# Patient Record
Sex: MALE | Race: WHITE | NOT HISPANIC OR LATINO | Employment: OTHER | URBAN - METROPOLITAN AREA
[De-identification: names, ages, dates, MRNs, and addresses within clinical notes are randomized per-mention and may not be internally consistent; named-entity substitution may affect disease eponyms.]

---

## 2022-10-26 ENCOUNTER — MEDICAL CORRESPONDENCE (OUTPATIENT)
Dept: HEALTH INFORMATION MANAGEMENT | Facility: CLINIC | Age: 87
End: 2022-10-26

## 2022-12-06 ENCOUNTER — APPOINTMENT (OUTPATIENT)
Dept: GENERAL RADIOLOGY | Facility: CLINIC | Age: 87
DRG: 480 | End: 2022-12-06
Attending: EMERGENCY MEDICINE
Payer: COMMERCIAL

## 2022-12-06 ENCOUNTER — APPOINTMENT (OUTPATIENT)
Dept: CT IMAGING | Facility: CLINIC | Age: 87
DRG: 480 | End: 2022-12-06
Attending: EMERGENCY MEDICINE
Payer: COMMERCIAL

## 2022-12-06 ENCOUNTER — HOSPITAL ENCOUNTER (INPATIENT)
Facility: CLINIC | Age: 87
LOS: 15 days | Discharge: NURSING, REHAB OR LONG-TERM CARE | DRG: 480 | End: 2022-12-21
Attending: EMERGENCY MEDICINE | Admitting: STUDENT IN AN ORGANIZED HEALTH CARE EDUCATION/TRAINING PROGRAM
Payer: COMMERCIAL

## 2022-12-06 ENCOUNTER — TRANSFERRED RECORDS (OUTPATIENT)
Dept: HEALTH INFORMATION MANAGEMENT | Facility: CLINIC | Age: 87
End: 2022-12-06

## 2022-12-06 DIAGNOSIS — S63.125A CLOSED DISLOCATION OF INTERPHALANGEAL JOINT OF LEFT THUMB, INITIAL ENCOUNTER: ICD-10-CM

## 2022-12-06 DIAGNOSIS — R79.89 ELEVATED TROPONIN: ICD-10-CM

## 2022-12-06 DIAGNOSIS — S61.012A THUMB LACERATION, LEFT, INITIAL ENCOUNTER: ICD-10-CM

## 2022-12-06 DIAGNOSIS — S42.201A CLOSED FRACTURE OF PROXIMAL END OF RIGHT HUMERUS, UNSPECIFIED FRACTURE MORPHOLOGY, INITIAL ENCOUNTER: ICD-10-CM
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
## 2022-12-06 DIAGNOSIS — J18.9 PNEUMONIA DUE TO INFECTIOUS ORGANISM, UNSPECIFIED LATERALITY, UNSPECIFIED PART OF LUNG: Primary | ICD-10-CM

## 2022-12-06 DIAGNOSIS — S72.142A DISPLACED INTERTROCHANTERIC FRACTURE OF LEFT FEMUR, INITIAL ENCOUNTER FOR CLOSED FRACTURE (H): ICD-10-CM

## 2022-12-06 LAB
ALBUMIN UR-MCNC: 30 MG/DL
ANION GAP SERPL CALCULATED.3IONS-SCNC: 10 MMOL/L (ref 7–15)
APPEARANCE UR: CLEAR
BACTERIA #/AREA URNS HPF: ABNORMAL /HPF
BASOPHILS # BLD AUTO: 0 10E3/UL (ref 0–0.2)
BASOPHILS NFR BLD AUTO: 0 %
BILIRUB UR QL STRIP: NEGATIVE
BUN SERPL-MCNC: 24 MG/DL (ref 8–23)
CALCIUM SERPL-MCNC: 9.2 MG/DL (ref 8.8–10.2)
CHLORIDE SERPL-SCNC: 103 MMOL/L (ref 98–107)
COLOR UR AUTO: YELLOW
CREAT SERPL-MCNC: 0.93 MG/DL (ref 0.67–1.17)
DEPRECATED CALCIDIOL+CALCIFEROL SERPL-MC: 51 UG/L (ref 20–75)
DEPRECATED HCO3 PLAS-SCNC: 25 MMOL/L (ref 22–29)
EOSINOPHIL # BLD AUTO: 0 10E3/UL (ref 0–0.7)
EOSINOPHIL NFR BLD AUTO: 0 %
ERYTHROCYTE [DISTWIDTH] IN BLOOD BY AUTOMATED COUNT: 11.8 % (ref 10–15)
GFR SERPL CREATININE-BSD FRML MDRD: 80 ML/MIN/1.73M2
GLUCOSE SERPL-MCNC: 155 MG/DL (ref 70–99)
GLUCOSE UR STRIP-MCNC: NEGATIVE MG/DL
HCT VFR BLD AUTO: 38.3 % (ref 40–53)
HGB BLD-MCNC: 12.6 G/DL (ref 13.3–17.7)
HGB UR QL STRIP: NEGATIVE
HOLD SPECIMEN: NORMAL
HYALINE CASTS: 1 /LPF
IMM GRANULOCYTES # BLD: 0.2 10E3/UL
IMM GRANULOCYTES NFR BLD: 1 %
KETONES UR STRIP-MCNC: 20 MG/DL
LEUKOCYTE ESTERASE UR QL STRIP: NEGATIVE
LYMPHOCYTES # BLD AUTO: 0.6 10E3/UL (ref 0.8–5.3)
LYMPHOCYTES NFR BLD AUTO: 3 %
MAGNESIUM SERPL-MCNC: 2 MG/DL (ref 1.7–2.3)
MCH RBC QN AUTO: 32.6 PG (ref 26.5–33)
MCHC RBC AUTO-ENTMCNC: 32.9 G/DL (ref 31.5–36.5)
MCV RBC AUTO: 99 FL (ref 78–100)
MONOCYTES # BLD AUTO: 1.2 10E3/UL (ref 0–1.3)
MONOCYTES NFR BLD AUTO: 6 %
MUCOUS THREADS #/AREA URNS LPF: PRESENT /LPF
NEUTROPHILS # BLD AUTO: 18.4 10E3/UL (ref 1.6–8.3)
NEUTROPHILS NFR BLD AUTO: 90 %
NITRATE UR QL: NEGATIVE
NRBC # BLD AUTO: 0 10E3/UL
NRBC BLD AUTO-RTO: 0 /100
PH UR STRIP: 5.5 [PH] (ref 5–7)
PLATELET # BLD AUTO: 278 10E3/UL (ref 150–450)
POTASSIUM SERPL-SCNC: 4.1 MMOL/L (ref 3.4–5.3)
RBC # BLD AUTO: 3.86 10E6/UL (ref 4.4–5.9)
RBC URINE: 2 /HPF
SODIUM SERPL-SCNC: 138 MMOL/L (ref 136–145)
SP GR UR STRIP: 1.01 (ref 1–1.03)
SQUAMOUS EPITHELIAL: 2 /HPF
TROPONIN T SERPL HS-MCNC: 26 NG/L
TROPONIN T SERPL HS-MCNC: 31 NG/L
UROBILINOGEN UR STRIP-MCNC: NORMAL MG/DL
WBC # BLD AUTO: 20.3 10E3/UL (ref 4–11)
WBC URINE: 3 /HPF

## 2022-12-06 PROCEDURE — 90715 TDAP VACCINE 7 YRS/> IM: CPT | Performed by: EMERGENCY MEDICINE

## 2022-12-06 PROCEDURE — 96365 THER/PROPH/DIAG IV INF INIT: CPT

## 2022-12-06 PROCEDURE — 82306 VITAMIN D 25 HYDROXY: CPT | Performed by: PHYSICIAN ASSISTANT

## 2022-12-06 PROCEDURE — 84484 ASSAY OF TROPONIN QUANT: CPT | Performed by: EMERGENCY MEDICINE

## 2022-12-06 PROCEDURE — 81001 URINALYSIS AUTO W/SCOPE: CPT | Performed by: STUDENT IN AN ORGANIZED HEALTH CARE EDUCATION/TRAINING PROGRAM

## 2022-12-06 PROCEDURE — 99223 1ST HOSP IP/OBS HIGH 75: CPT | Mod: AI | Performed by: STUDENT IN AN ORGANIZED HEALTH CARE EDUCATION/TRAINING PROGRAM

## 2022-12-06 PROCEDURE — 73700 CT LOWER EXTREMITY W/O DYE: CPT | Mod: RT

## 2022-12-06 PROCEDURE — 99285 EMERGENCY DEPT VISIT HI MDM: CPT | Mod: 25

## 2022-12-06 PROCEDURE — 72125 CT NECK SPINE W/O DYE: CPT

## 2022-12-06 PROCEDURE — 258N000003 HC RX IP 258 OP 636: Performed by: STUDENT IN AN ORGANIZED HEALTH CARE EDUCATION/TRAINING PROGRAM

## 2022-12-06 PROCEDURE — 250N000011 HC RX IP 250 OP 636: Performed by: STUDENT IN AN ORGANIZED HEALTH CARE EDUCATION/TRAINING PROGRAM

## 2022-12-06 PROCEDURE — 70450 CT HEAD/BRAIN W/O DYE: CPT

## 2022-12-06 PROCEDURE — C9803 HOPD COVID-19 SPEC COLLECT: HCPCS

## 2022-12-06 PROCEDURE — 12002 RPR S/N/AX/GEN/TRNK2.6-7.5CM: CPT

## 2022-12-06 PROCEDURE — 26770 TREAT FINGER DISLOCATION: CPT | Mod: FA

## 2022-12-06 PROCEDURE — 83735 ASSAY OF MAGNESIUM: CPT | Performed by: EMERGENCY MEDICINE

## 2022-12-06 PROCEDURE — 90471 IMMUNIZATION ADMIN: CPT | Performed by: EMERGENCY MEDICINE

## 2022-12-06 PROCEDURE — 250N000011 HC RX IP 250 OP 636: Performed by: EMERGENCY MEDICINE

## 2022-12-06 PROCEDURE — 250N000013 HC RX MED GY IP 250 OP 250 PS 637: Performed by: STUDENT IN AN ORGANIZED HEALTH CARE EDUCATION/TRAINING PROGRAM

## 2022-12-06 PROCEDURE — 85025 COMPLETE CBC W/AUTO DIFF WBC: CPT | Performed by: EMERGENCY MEDICINE

## 2022-12-06 PROCEDURE — 73030 X-RAY EXAM OF SHOULDER: CPT | Mod: RT

## 2022-12-06 PROCEDURE — 73130 X-RAY EXAM OF HAND: CPT | Mod: LT

## 2022-12-06 PROCEDURE — 73560 X-RAY EXAM OF KNEE 1 OR 2: CPT | Mod: RT

## 2022-12-06 PROCEDURE — 36415 COLL VENOUS BLD VENIPUNCTURE: CPT | Performed by: EMERGENCY MEDICINE

## 2022-12-06 PROCEDURE — 80048 BASIC METABOLIC PNL TOTAL CA: CPT | Performed by: EMERGENCY MEDICINE

## 2022-12-06 PROCEDURE — 73502 X-RAY EXAM HIP UNI 2-3 VIEWS: CPT

## 2022-12-06 PROCEDURE — 120N000001 HC R&B MED SURG/OB

## 2022-12-06 RX ORDER — DONEPEZIL HYDROCHLORIDE 5 MG/1
5 TABLET, FILM COATED ORAL AT BEDTIME
Status: ON HOLD | COMMUNITY
End: 2024-01-01

## 2022-12-06 RX ORDER — DOCUSATE SODIUM 100 MG/1
100 CAPSULE, LIQUID FILLED ORAL 2 TIMES DAILY
Status: DISCONTINUED | OUTPATIENT
Start: 2022-12-06 | End: 2022-12-17

## 2022-12-06 RX ORDER — ACETAMINOPHEN 325 MG/1
975 TABLET ORAL EVERY 8 HOURS
Status: DISCONTINUED | OUTPATIENT
Start: 2022-12-06 | End: 2022-12-21 | Stop reason: HOSPADM

## 2022-12-06 RX ORDER — VITAMIN B COMPLEX
50 TABLET ORAL DAILY
Status: DISCONTINUED | OUTPATIENT
Start: 2022-12-07 | End: 2022-12-21 | Stop reason: HOSPADM

## 2022-12-06 RX ORDER — MULTIPLE VITAMINS W/ MINERALS TAB 9MG-400MCG
1 TAB ORAL EVERY EVENING
COMMUNITY

## 2022-12-06 RX ORDER — CEFAZOLIN SODIUM 2 G/100ML
2 INJECTION, SOLUTION INTRAVENOUS ONCE
Status: COMPLETED | OUTPATIENT
Start: 2022-12-06 | End: 2022-12-06

## 2022-12-06 RX ORDER — PROCHLORPERAZINE MALEATE 5 MG
5 TABLET ORAL EVERY 6 HOURS PRN
Status: DISCONTINUED | OUTPATIENT
Start: 2022-12-06 | End: 2022-12-21 | Stop reason: HOSPADM

## 2022-12-06 RX ORDER — GINGER ROOT/GINGER ROOT EXT 262.5 MG
1 CAPSULE ORAL EVERY EVENING
COMMUNITY

## 2022-12-06 RX ORDER — HYDROMORPHONE HCL IN WATER/PF 6 MG/30 ML
0.2 PATIENT CONTROLLED ANALGESIA SYRINGE INTRAVENOUS
Status: DISCONTINUED | OUTPATIENT
Start: 2022-12-06 | End: 2022-12-15

## 2022-12-06 RX ORDER — IBUPROFEN 200 MG
400 TABLET ORAL EVERY 4 HOURS PRN
Status: ON HOLD | COMMUNITY
End: 2022-12-20

## 2022-12-06 RX ORDER — ONDANSETRON 2 MG/ML
4 INJECTION INTRAMUSCULAR; INTRAVENOUS EVERY 6 HOURS PRN
Status: DISCONTINUED | OUTPATIENT
Start: 2022-12-06 | End: 2022-12-21 | Stop reason: HOSPADM

## 2022-12-06 RX ORDER — BISACODYL 10 MG
10 SUPPOSITORY, RECTAL RECTAL DAILY PRN
Status: DISCONTINUED | OUTPATIENT
Start: 2022-12-06 | End: 2022-12-07

## 2022-12-06 RX ORDER — DONEPEZIL HYDROCHLORIDE 5 MG/1
5 TABLET, FILM COATED ORAL AT BEDTIME
Status: DISCONTINUED | OUTPATIENT
Start: 2022-12-06 | End: 2022-12-21 | Stop reason: HOSPADM

## 2022-12-06 RX ORDER — MULTIVIT WITH MINERALS/LUTEIN
1000 TABLET ORAL DAILY
COMMUNITY

## 2022-12-06 RX ORDER — BUPIVACAINE HYDROCHLORIDE 5 MG/ML
INJECTION, SOLUTION EPIDURAL; INTRACAUDAL
Status: DISCONTINUED
Start: 2022-12-06 | End: 2022-12-06 | Stop reason: HOSPADM

## 2022-12-06 RX ORDER — MAGNESIUM OXIDE 400 MG/1
400 TABLET ORAL DAILY
Status: ON HOLD | COMMUNITY
End: 2024-01-01

## 2022-12-06 RX ORDER — ACETAMINOPHEN 500 MG
1000 TABLET ORAL EVERY 6 HOURS PRN
COMMUNITY
End: 2024-01-01

## 2022-12-06 RX ORDER — SODIUM CHLORIDE, SODIUM LACTATE, POTASSIUM CHLORIDE, CALCIUM CHLORIDE 600; 310; 30; 20 MG/100ML; MG/100ML; MG/100ML; MG/100ML
INJECTION, SOLUTION INTRAVENOUS CONTINUOUS
Status: DISCONTINUED | OUTPATIENT
Start: 2022-12-06 | End: 2022-12-07

## 2022-12-06 RX ORDER — AMOXICILLIN 250 MG
1 CAPSULE ORAL 2 TIMES DAILY PRN
Status: DISCONTINUED | OUTPATIENT
Start: 2022-12-06 | End: 2022-12-21 | Stop reason: HOSPADM

## 2022-12-06 RX ORDER — AMOXICILLIN 250 MG
2 CAPSULE ORAL 2 TIMES DAILY PRN
Status: DISCONTINUED | OUTPATIENT
Start: 2022-12-06 | End: 2022-12-21 | Stop reason: HOSPADM

## 2022-12-06 RX ORDER — SERTRALINE HYDROCHLORIDE 100 MG/1
150 TABLET, FILM COATED ORAL DAILY
COMMUNITY

## 2022-12-06 RX ORDER — LIDOCAINE 40 MG/G
CREAM TOPICAL
Status: DISCONTINUED | OUTPATIENT
Start: 2022-12-06 | End: 2022-12-07

## 2022-12-06 RX ORDER — PROCHLORPERAZINE 25 MG
12.5 SUPPOSITORY, RECTAL RECTAL EVERY 12 HOURS PRN
Status: DISCONTINUED | OUTPATIENT
Start: 2022-12-06 | End: 2022-12-21 | Stop reason: HOSPADM

## 2022-12-06 RX ORDER — HEPARIN SODIUM 5000 [USP'U]/.5ML
5000 INJECTION, SOLUTION INTRAVENOUS; SUBCUTANEOUS EVERY 12 HOURS
Status: DISCONTINUED | OUTPATIENT
Start: 2022-12-06 | End: 2022-12-06

## 2022-12-06 RX ORDER — ONDANSETRON 4 MG/1
4 TABLET, ORALLY DISINTEGRATING ORAL EVERY 6 HOURS PRN
Status: DISCONTINUED | OUTPATIENT
Start: 2022-12-06 | End: 2022-12-21 | Stop reason: HOSPADM

## 2022-12-06 RX ADMIN — HYDROMORPHONE HYDROCHLORIDE 1 MG: 2 TABLET ORAL at 18:34

## 2022-12-06 RX ADMIN — DONEPEZIL HYDROCHLORIDE 5 MG: 5 TABLET ORAL at 21:13

## 2022-12-06 RX ADMIN — ACETAMINOPHEN 975 MG: 325 TABLET, FILM COATED ORAL at 21:13

## 2022-12-06 RX ADMIN — SODIUM CHLORIDE, POTASSIUM CHLORIDE, SODIUM LACTATE AND CALCIUM CHLORIDE: 600; 310; 30; 20 INJECTION, SOLUTION INTRAVENOUS at 13:18

## 2022-12-06 RX ADMIN — ACETAMINOPHEN 975 MG: 325 TABLET, FILM COATED ORAL at 14:03

## 2022-12-06 RX ADMIN — CEFAZOLIN SODIUM 2 G: 2 INJECTION, SOLUTION INTRAVENOUS at 10:32

## 2022-12-06 RX ADMIN — CLOSTRIDIUM TETANI TOXOID ANTIGEN (FORMALDEHYDE INACTIVATED), CORYNEBACTERIUM DIPHTHERIAE TOXOID ANTIGEN (FORMALDEHYDE INACTIVATED), BORDETELLA PERTUSSIS TOXOID ANTIGEN (GLUTARALDEHYDE INACTIVATED), BORDETELLA PERTUSSIS FILAMENTOUS HEMAGGLUTININ ANTIGEN (FORMALDEHYDE INACTIVATED), BORDETELLA PERTUSSIS PERTACTIN ANTIGEN, AND BORDETELLA PERTUSSIS FIMBRIAE 2/3 ANTIGEN 0.5 ML: 5; 2; 2.5; 5; 3; 5 INJECTION, SUSPENSION INTRAMUSCULAR at 10:32

## 2022-12-06 RX ADMIN — HYDROMORPHONE HYDROCHLORIDE 0.2 MG: 0.2 INJECTION, SOLUTION INTRAMUSCULAR; INTRAVENOUS; SUBCUTANEOUS at 14:03

## 2022-12-06 RX ADMIN — DOCUSATE SODIUM 100 MG: 100 CAPSULE, LIQUID FILLED ORAL at 20:24

## 2022-12-06 RX ADMIN — SERTRALINE HYDROCHLORIDE 50 MG: 50 TABLET ORAL at 18:34

## 2022-12-06 ASSESSMENT — ACTIVITIES OF DAILY LIVING (ADL)
ADLS_ACUITY_SCORE: 32
WEAR_GLASSES_OR_BLIND: YES
FALL_HISTORY_WITHIN_LAST_SIX_MONTHS: YES
ADLS_ACUITY_SCORE: 36
NUMBER_OF_TIMES_PATIENT_HAS_FALLEN_WITHIN_LAST_SIX_MONTHS: 2
WALKING_OR_CLIMBING_STAIRS: AMBULATION DIFFICULTY, ASSISTANCE 1 PERSON
ADLS_ACUITY_SCORE: 36
VISION_MANAGEMENT: GLASSES
ADLS_ACUITY_SCORE: 35
ADLS_ACUITY_SCORE: 35
DIFFICULTY_EATING/SWALLOWING: NO
ADLS_ACUITY_SCORE: 36
ADLS_ACUITY_SCORE: 36
CONCENTRATING,_REMEMBERING_OR_MAKING_DECISIONS_DIFFICULTY: YES
CHANGE_IN_FUNCTIONAL_STATUS_SINCE_ONSET_OF_CURRENT_ILLNESS/INJURY: YES
WALKING_OR_CLIMBING_STAIRS_DIFFICULTY: YES
TOILETING_ISSUES: OTHER (SEE COMMENTS)
DRESSING/BATHING: BATHING DIFFICULTY, ASSISTANCE 1 PERSON
DRESSING/BATHING_DIFFICULTY: YES
DOING_ERRANDS_INDEPENDENTLY_DIFFICULTY: NO
EATING/SWALLOWING: OTHER (SEE COMMENTS)
ADLS_ACUITY_SCORE: 36

## 2022-12-06 ASSESSMENT — ENCOUNTER SYMPTOMS
WOUND: 1
ARTHRALGIAS: 1
MYALGIAS: 1

## 2022-12-06 NOTE — CONSULTS
[facility]    Orthopedic Consultation    Scott Abbasi MRN# [id number]   Age: 87 year old YOB: 1935     Date of Admission: 12/6/2022    Reason for consult: Left thumb open fracture/dislocation.  Right proximal humerus fracture  Right intertrochanteric hip fracture       Requesting provider: Call from Dr Hoff in ED       Level of consult: Consult, follow and place orders           Assessment and Plan:   Assessment:   Left thumb open fracture/dislocation.  Right proximal humerus fracture  Right intertrochanteric hip fracture      Plan:   Patient will be scheduled for a right hip IM Nail and I&D left thumb tomorrow morning pending patient is optimized for surgery per hospitalist.    Surgeon: Dr Carlos Horowitz  NPO Status effective midnight  Non-operative management right proximal humerus: apply clinic shoulder immobilizer (patient care order placed)   NWB right LE until post-op  NWB right upper extremity, max lift coffee cup  Keep splint applied to left thumb.  Vit D ordered   Hold anticoagulants if taken: none reported PTA   Pain medication as needed, minimize narcotics as able           Chief Complaint:   Right hip and shoulder pain          History of Present Illness:   Scott Abbasi is a 87 year old male admitted on 12/6/2022.  He has history of Parkinson's disease and dementia.  Scott live in a group home.  He has been  recovering from pelvic fracture that occurred in June.  He ambulates with a walker at baseline.   Patient was found around 3 AM after the staff heard a thump.  He was noted to have laceration of the left thumb, right shoulder, hip and knee pain.            Past Medical History:   No past medical history on file.          Past Surgical History:   No past surgical history on file.          Social History:     Social History     Tobacco Use     Smoking status: Not on file     Smokeless tobacco: Not on file   Substance Use Topics     Alcohol  use: Not on file             Family History:   No family history on file.          Immunizations:     VACCINE/DOSE   Diptheria   DPT   DTAP   HBIG   Hepatitis A   Hepatitis B   HIB   Influenza   Measles   Meningococcal   MMR   Mumps   Pneumococcal   Polio   Rubella   Small Pox   TDAP   Varicella   Zoster             Allergies:   No Known Allergies          Medications:     Current Facility-Administered Medications   Medication     bupivacaine (PF) (MARCAINE) 0.5 % injection     No current outpatient medications on file.             Review of Systems:   ROS:  10 point ROS neg other than the symptoms noted above in the HPI.            Physical Exam:   All vitals have been reviewed  Patient Vitals for the past 24 hrs:   BP Temp Temp src Pulse Resp SpO2 Weight   12/06/22 1104 (!) 146/71 -- -- -- -- 100 % --   12/06/22 1044 -- -- -- -- -- 97 % --   12/06/22 1034 139/68 -- -- -- -- -- --   12/06/22 0930 -- -- -- -- -- 97 % --   12/06/22 0915 -- -- -- -- -- 100 % --   12/06/22 0845 -- -- -- -- -- 100 % --   12/06/22 0830 (!) 140/65 -- -- 61 -- 100 % --   12/06/22 0815 -- -- -- 67 -- 98 % --   12/06/22 0800 132/59 -- -- 62 -- 100 % --   12/06/22 0748 (!) 145/59 97.6  F (36.4  C) Oral 68 16 97 % 79.5 kg (175 lb 3.2 oz)     No intake or output data in the 24 hours ending 12/06/22 1136      Physical Exam   Temp: 97.6  F (36.4  C) Temp src: Oral BP: (!) 146/71 Pulse: 61   Resp: 16 SpO2: 100 %      Vital Signs with Ranges  Temp:  [97.6  F (36.4  C)] 97.6  F (36.4  C)  Pulse:  [61-68] 61  Resp:  [16] 16  BP: (132-146)/(59-71) 146/71  SpO2:  [97 %-100 %] 100 %  175 lbs 3.2 oz    Constitutional: Pleasant, alert, able to answer yes/no questions.  Has parkinson's.   HEENT: Head atraumatic, pupils reactive.   Respiratory: Unlabored breathing no audible wheeze  Cardiovascular: Regular rate and rhythm per pulses  GI: Abdomen non-distended.  Lymph/Hematologic: No lymphadenopathy in areas examined  Genitourinary:  No schaefer  Skin: No  rashes, no cyanosis, no edema.  Musculoskeletal: On physical exam of the right lower extremity, patient's leg is resting in a shortened and externally rotated position.  No skin abrasions seen at the hip.  Patient is able to dorsi and plantarflex both ankles.  Reactive to sensation to light touch on the left versus right lower extremities.  Distal pulses are intact and equal bilaterally.      Joint effusion present right shoulder.  TTP along the proximal humerus.  Able to flex/extend the wrist and fingers.  Able to  on the right.  Pulses present.      Splint in place on left thumb.  Mild swelling present.  Brisk cap refill.      Neurologic: dementia             Data:   All laboratory data reviewed  Results for orders placed or performed during the hospital encounter of 12/06/22   CT Head w/o Contrast     Status: None    Narrative    CT SCAN OF THE HEAD WITHOUT CONTRAST   12/6/2022 8:58 AM     HISTORY: Unwitnessed fall, dementia.    TECHNIQUE:  Axial images of the head and coronal reformations without  IV contrast material. Radiation dose for this scan was reduced using  automated exposure control, adjustment of the mA and/or kV according  to patient size, or iterative reconstruction technique.    COMPARISON: None.    FINDINGS:  Suboptimal patient positioning. No evidence of hemorrhage.  Volume loss and patchy/confluent white matter hypoattenuation which  likely represents advanced chronic small vessel ischemic change. No  acute osseous anomaly.      Impression    IMPRESSION:  No acute abnormality identified.    ABAD PEACE MD         SYSTEM ID:  [id number]   CT Cervical Spine w/o Contrast     Status: None    Narrative    CT CERVICAL SPINE WITHOUT CONTRAST   12/6/2022 8:59 AM     HISTORY: Unwitnessed fall, dementia.     TECHNIQUE: Axial images of the cervical spine were obtained without  intravenous contrast. Multiplanar reformations were performed.   Radiation dose for this scan was reduced using automated  exposure  control, adjustment of the mA and/or kV according to patient size, or  iterative reconstruction technique.    COMPARISON: None.    FINDINGS: No evidence of acute fracture or posttraumatic subluxation.  Moderate degenerative disc disease at C3-C4, C5-C6, and C6-C7.  Background of mild degenerative disc disease. Multilevel facet  arthropathy. Mild spinal canal stenoses at multiple levels related to  disc osteophyte complexes. Severe foraminal stenoses at multiple  levels bilaterally. Presumed atelectasis at the lung apices. Calcified  left upper lobe lung nodule.      Impression    IMPRESSION: No evidence of acute fracture or subluxation in the  cervical spine.    ABAD PEACE MD         SYSTEM ID:  [id number]   XR Pelvis w Hip Right G/E 2 Views     Status: None    Narrative    XR PELVIS AND HIP RIGHT 2 VIEWS 12/6/2022 10:27 AM     HISTORY: fall, trochanteric pain    COMPARISON: None.       Impression    IMPRESSION: There is an acute displaced and angulated  intertrochanteric right proximal femoral fracture. No hip dislocation.  Minimal underlying right hip arthrosis.    There is an additional vertical lucency seen on the AP view projecting  over the superior acetabulum. While this may be projectional, an  acetabular fracture is difficult to completely exclude on this study.    Prostate radiation seeds. Bones are demineralized.    NOTE: ABNORMAL REPORT    THE DICTATION ABOVE DESCRIBES AN ABNORMAL REPORT FOR WHICH FOLLOW-UP  IS NEEDED.    LENNY STEWART MD         SYSTEM ID:  [id number]   XR Knee Right 1/2 Views     Status: None    Narrative    XR KNEE RIGHT 1/2 VIEWS 12/6/2022 10:27 AM     HISTORY: fall, knee pain    COMPARISON: None.       Impression    IMPRESSION: Subtle undulation and irregularity of the tibial plateau  does raise concern for a nondisplaced fracture. Question complex joint  effusion. Recommend CT of the knee further evaluation.    No significant joint space narrowing.    NOTE:  ABNORMAL REPORT    THE DICTATION ABOVE DESCRIBES AN ABNORMAL REPORT FOR WHICH FOLLOW-UP  IS NEEDED.    LENNY STEWART MD         SYSTEM ID:  [id number]   XR Shoulder Right G/E 3 Views     Status: None    Narrative    XR SHOULDER RIGHT G/E 3 VIEWS 12/6/2022 10:26 AM     HISTORY: fall, right shoulder pain    COMPARISON: None.       Impression    IMPRESSION: Acute impacted complex proximal humerus fracture with  surgical neck and tuberosity involvement. There is surrounding soft  tissue swelling and varus angulation.    Mild glenohumeral and acromioclavicular joint degenerative change.  Bones are demineralized.    NOTE: ABNORMAL REPORT    THE DICTATION ABOVE DESCRIBES AN ABNORMAL REPORT FOR WHICH FOLLOW-UP  IS NEEDED.    LENNY STEWART MD         SYSTEM ID:  [id number]   XR Hand Left G/E 3 Views     Status: None    Narrative    XR HAND LEFT G/E 3 VIEWS 12/6/2022 10:28 AM     HISTORY: left DIP dislocation. Pain.    COMPARISON: None.       Impression    IMPRESSION: The bones are demineralized. There is no evidence of acute  fracture. No DIP joint dislocation seen involving the second through  fifth fingers on this study. No evidence of acute fracture. Severe  first MCP joint arthrosis.    LENNY STEWART MD         SYSTEM ID:  [id number]   Zenda Draw     Status: None    Narrative    The following orders were created for panel order Zenda Draw.  Procedure                               Abnormality         Status                     ---------                               -----------         ------                     Extra Blue Top Tube[[id number]]                              Final result               Extra Red Top Tube[[id number]]                               Final result               Extra Green Top (Lithium...[[id number]]                      Final result               Extra Purple Top Tube[[id number]]                            Final result                 Please view results for these tests on the  individual orders.   Extra Blue Top Tube     Status: None   Result Value Ref Range    Hold Specimen JIC    Extra Red Top Tube     Status: None   Result Value Ref Range    Hold Specimen JIC    Extra Green Top (Lithium Heparin) Tube     Status: None   Result Value Ref Range    Hold Specimen JIC    Extra Purple Top Tube     Status: None   Result Value Ref Range    Hold Specimen JIC    Basic metabolic panel     Status: Abnormal   Result Value Ref Range    Sodium 138 136 - 145 mmol/L    Potassium 4.1 3.4 - 5.3 mmol/L    Chloride 103 98 - 107 mmol/L    Carbon Dioxide (CO2) 25 22 - 29 mmol/L    Anion Gap 10 7 - 15 mmol/L    Urea Nitrogen 24.0 (H) 8.0 - 23.0 mg/dL    Creatinine 0.93 0.67 - 1.17 mg/dL    Calcium 9.2 8.8 - 10.2 mg/dL    Glucose 155 (H) 70 - 99 mg/dL    GFR Estimate 80 >60 mL/min/1.73m2   Magnesium     Status: Normal   Result Value Ref Range    Magnesium 2.0 1.7 - 2.3 mg/dL   CBC with platelets and differential     Status: Abnormal   Result Value Ref Range    WBC Count 20.3 (H) 4.0 - 11.0 10e3/uL    RBC Count 3.86 (L) 4.40 - 5.90 10e6/uL    Hemoglobin 12.6 (L) 13.3 - 17.7 g/dL    Hematocrit 38.3 (L) 40.0 - 53.0 %    MCV 99 78 - 100 fL    MCH 32.6 26.5 - 33.0 pg    MCHC 32.9 31.5 - 36.5 g/dL    RDW 11.8 10.0 - 15.0 %    Platelet Count 278 150 - 450 10e3/uL    % Neutrophils 90 %    % Lymphocytes 3 %    % Monocytes 6 %    % Eosinophils 0 %    % Basophils 0 %    % Immature Granulocytes 1 %    NRBCs per 100 WBC 0 <1 /100    Absolute Neutrophils 18.4 (H) 1.6 - 8.3 10e3/uL    Absolute Lymphocytes 0.6 (L) 0.8 - 5.3 10e3/uL    Absolute Monocytes 1.2 0.0 - 1.3 10e3/uL    Absolute Eosinophils 0.0 0.0 - 0.7 10e3/uL    Absolute Basophils 0.0 0.0 - 0.2 10e3/uL    Absolute Immature Granulocytes 0.2 <=0.4 10e3/uL    Absolute NRBCs 0.0 10e3/uL   Troponin T, High Sensitivity     Status: Abnormal   Result Value Ref Range    Troponin T, High Sensitivity 26 (H) <=22 ng/L   Troponin T, High Sensitivity (now)     Status: Abnormal    Result Value Ref Range    Troponin T, High Sensitivity 31 (H) <=22 ng/L   EKG 12-lead, tracing only     Status: None (Preliminary result)   Result Value Ref Range    Systolic Blood Pressure  mmHg    Diastolic Blood Pressure  mmHg    Ventricular Rate 67 BPM    Atrial Rate 67 BPM    CA Interval 190 ms    QRS Duration 96 ms     ms    QTc 471 ms    P Axis 73 degrees    R AXIS -39 degrees    T Axis 56 degrees    Interpretation ECG       Sinus rhythm  Left axis deviation  Minimal voltage criteria for LVH, may be normal variant  Abnormal ECG  No previous ECGs available     CBC with platelets differential     Status: Abnormal    Narrative    The following orders were created for panel order CBC with platelets differential.  Procedure                               Abnormality         Status                     ---------                               -----------         ------                     CBC with platelets and d...[[id number]]  Abnormal            Final result                 Please view results for these tests on the individual orders.          Attestation:  I have reviewed today's vital signs, notes, medications, labs and imaging with Dr. Carlos Horowitz.  Amount of time performed on this consult: 45 minutes.    Maria Elena Franco PA-C

## 2022-12-06 NOTE — PHARMACY-ADMISSION MEDICATION HISTORY
Admission medication history interview status for this patient is complete. See [facility] admission navigator for allergy information, prior to admission medications and immunization status.     Medication history interview done, indicate source(s): Caregiver  Medication history resources (including written lists, pill bottles, clinic record):med list from group home   Pharmacy:     Changes made to PTA medication list:  Added: all meds  Changed:   Reported as Not Taking:   Removed:     Actions taken by pharmacist (provider contacted, etc):sticky note/page provider     Additional medication history information:    Medication reconciliation/reorder completed by provider prior to medication history?  N   (Y/N)     For patients on insulin therapy:       Prior to Admission medications    Medication Sig Last Dose Taking? Auth Provider Long Term End Date   acetaminophen (TYLENOL) 32 mg/mL liquid Take 160 mg by mouth every 4 hours as needed for fever or mild pain  Yes Unknown, Entered By History     acetaminophen (TYLENOL) 500 MG tablet Take 1,000 mg by mouth every 6 hours as needed for mild pain 12/6/2022 Yes Unknown, Entered By History     Calcium Carb-Cholecalciferol 600-20 MG-MCG TABS Take 1 tablet by mouth daily 12/5/2022 Yes Unknown, Entered By History     donepezil (ARICEPT) 5 MG tablet Take 5 mg by mouth At Bedtime 12/5/2022 Yes Unknown, Entered By History     ibuprofen (ADVIL/MOTRIN) 200 MG tablet Take 400 mg by mouth every 4 hours as needed for pain  Yes Unknown, Entered By History     magnesium hydroxide (MILK OF MAGNESIA) 400 MG/5ML suspension Take 30 mLs by mouth daily as needed for constipation or heartburn Give on day 3 of no bm  Yes Unknown, Entered By History     magnesium oxide (MAG-OX) 400 MG tablet Take 400 mg by mouth daily 12/5/2022 Yes Unknown, Entered By History     Multiple Vitamins-Minerals (MACULAR HEALTH FORMULA PO) Take 14 mg by mouth daily 12/5/2022 Yes Unknown, Entered By History     multivitamin  w/minerals (THERA-VIT-M) tablet Take 1 tablet by mouth daily 12/5/2022 Yes Unknown, Entered By History     Omega-3 Fatty Acids (OMEGA 3 PO) Take 3 capsules by mouth daily 12/5/2022 Yes Unknown, Entered By History     sertraline (ZOLOFT) 50 MG tablet Take 50 mg by mouth daily 12/5/2022 Yes Unknown, Entered By History Yes    vitamin C (ASCORBIC ACID) 1000 MG TABS Take 1,000 mg by mouth daily 12/5/2022 Yes Unknown, Entered By History

## 2022-12-06 NOTE — H&P
[facility]    History and Physical - Hospitalist Service       Date of Admission:  12/6/2022    Assessment & Plan      Scott Abbasi is a 87 year old male admitted on 12/6/2022.    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  He had a pelvic fracture in June 2022.  He he has been able to walk with a walker without difficulty, able to answer simple questions even though he prefers not to talk much and able to feed himself with some help.      He was was found down at around 3 AM after the staff heard a thump.  He was noted to have laceration of the left thumb with right shoulder, right hip hip and right knee.  The staff found him still waiting CPAP mask and it appears he walked to go to the bathroom without taking his CPAP mask off.    Initial vitals: Temperature 97.6  F, heart rate 68, blood pressure 145/59, oxygen saturation of 97% on room air.    Right shoulder x-ray: Acute impacted complex proximal humerus fracture with surgical neck and tuberosity involved with surrounding tissue swelling and varus angulation.    Right knee x-ray: Subtle dilation and irregularity of trouble plate 2, concerning for nondisplaced fracture, CT of the knee recommended.    Pelvis and right hip x-ray: Acute displaced and angulated intertrochanteric proximal femoral fracture, acetabular fracture could not be ruled out.    X-ray of the left hand: Left thumb DIP dislocation.    CT head and cervical spine did not show any acute injuries.    White blood count of 20.3.  BMP was unremarkable.      1. Fall with polytrauma    Etiology of fall is unclear, likely related to his Parkinson's disease.    Multiple fractures as listed below.  o Right hip fracture, acetbular fracture could not be ruled out.  Consulted orthopedics, likely to OR tomorrow.  o Right proximal humerus surgical neck fracture.  Defer  to orthopedics, likely conservative management.  o Suspected right knee tibial plateau fracture on x-ray  o Left hand laceration and dislocation.  Repaired and reduced in the ER, currently splinted.    2. Leukocytosis.    White count of 20,000, likely stress demargination.  No obvious source of infection.    3. Elevated troponin    26--> 31.  Likely nonspecific rise.  EKG unremarkable and no chest pain.    Okay to proceed with surgery.    4. Parkinson's disease, Lewy body dementia    Prior to admission on on Aricept, continue.  Not on any Parkinson's disease medication.    5. Mental retardation phenylketonuria.      His sister and niece are his guardians.    6. Generalized anxiety disorder    Start prior to admission Zoloft, resume.    7. Severe sleep apnea.    Continue using CPAP.    8. Age-related macular degeneration.       Diet:  Regular diet, n.p.o. at midnight.  DVT Prophylaxis: Heparin SQ tonight, may switch to Lovenox after surgery.  Tay Catheter: Not present  Central Lines: None  Cardiac Monitoring: None  Code Status:  Full code.  I had detailed discussion with the patient's niece Ms. Irina Joshua at [phone].  Patient will be full code for now but she has been discussing that with her mother (patient's sister) and they are leaning towards DNR/DNI but have not decided yet.  She will call back if they change their decision today.    Clinically Significant Risk Factors Present on Admission                              Disposition Plan         The patient's care was discussed with the ER physician.  I also discussed with  Ms. Lorraine Valverde at bedside and niece Irina Joshua over the phone.  I could not reach the sister Letty Joshua.    Thanh Stein MD  Hospitalist Service  [facility]  Securely message with the Vocera Web Console (learn more here)  Text page via [employer] Paging/Directory          ______________________________________________________________________    Chief Complaint   Fall    History is obtained from the patient and  and niece    History of Present Illness     Scott Abbasi is a 87 year old male admitted on 12/6/2022.    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  He had a pelvic fracture in June 2022.  He he has been able to walk with a walker without difficulty, able to answer simple questions even though he prefers not to talk much and able to feed himself with some help.      He was was found down at around 3 AM after the staff heard a thump.  He was noted to have laceration of the left thumb with right shoulder, right hip hip and right knee.  The staff found him still waiting CPAP mask and it appears he walked to go to the bathroom without taking his CPAP mask off.    Initial vitals: Temperature 97.6  F, heart rate 68, blood pressure 145/59, oxygen saturation of 97% on room air.    Right shoulder x-ray: Acute impacted complex proximal humerus fracture with surgical neck and tuberosity involved with surrounding tissue swelling and varus angulation.    Right knee x-ray: Subtle dilation and irregularity of trouble plate 2, concerning for nondisplaced fracture, CT of the knee recommended.    Pelvis and right hip x-ray: Acute displaced and angulated intertrochanteric proximal femoral fracture, acetabular fracture could not be ruled out.    X-ray of the left hand: Left thumb DIP dislocation.    CT head and cervical spine did not show any acute injuries.    White blood count of 20.3.  BMP was unremarkable.      Review of Systems    The 10 point Review of Systems is negative other than noted in the HPI or here.     Past Medical History    I have reviewed this patient's medical history and updated it with pertinent information if needed.   Mental  retardation, phenylketonuria and Parkinson's disease.    Past Surgical History   I have reviewed this patient's surgical history and updated it with pertinent information if needed.  No past surgical history on file.    Social History   I have reviewed this patient's social history and updated it with pertinent information if needed.   Lives in a group home    Family History     No significant family history, including no history of: Hip fracture    Prior to Admission Medications   None     Allergies   No Known Allergies    Physical Exam   Vital Signs: Temp: 97.6  F (36.4  C) Temp src: Oral BP: (!) 140/65 Pulse: 61   Resp: 16 SpO2: 97 %      Weight: 175 lbs 3.2 oz    General Appearance: Elderly male, in no acute distress the fracture extremities removed.  He is able to answer simple questions and appears confused.  Mental status is at baseline as per the .  Eyes: No icterus  HEENT: Dry mucosa  Respiratory: Clear to auscultation  Cardiovascular: S1 and S2 is normal  GI: Soft and nontender  Lymph/Hematologic: Not examined  Genitourinary: Not examined  Skin: No rash  Musculoskeletal: Right leg is externally rotated.  Right arm is resting by the side but was not moved by me because of known fracture.  Left arm is in a splint.  Neurologic: Nonfocal  Psychiatric: Normal mood      Data   Data reviewed today: I reviewed all medications, new labs and imaging results over the last 24 hours.      Recent Labs   Lab 12/06/22  0753   WBC 20.3*   HGB 12.6*   MCV 99         POTASSIUM 4.1   CHLORIDE 103   CO2 25   BUN 24.0*   CR 0.93   ANIONGAP 10   FLORENTINO 9.2   *     Recent Results (from the past 24 hour(s))   CT Head w/o Contrast    Narrative    CT SCAN OF THE HEAD WITHOUT CONTRAST   12/6/2022 8:58 AM     HISTORY: Unwitnessed fall, dementia.    TECHNIQUE:  Axial images of the head and coronal reformations without  IV contrast material. Radiation dose for this scan was reduced using  automated  exposure control, adjustment of the mA and/or kV according  to patient size, or iterative reconstruction technique.    COMPARISON: None.    FINDINGS:  Suboptimal patient positioning. No evidence of hemorrhage.  Volume loss and patchy/confluent white matter hypoattenuation which  likely represents advanced chronic small vessel ischemic change. No  acute osseous anomaly.      Impression    IMPRESSION:  No acute abnormality identified.    ABAD PEACE MD         SYSTEM ID:  [id number]   CT Cervical Spine w/o Contrast    Narrative    CT CERVICAL SPINE WITHOUT CONTRAST   12/6/2022 8:59 AM     HISTORY: Unwitnessed fall, dementia.     TECHNIQUE: Axial images of the cervical spine were obtained without  intravenous contrast. Multiplanar reformations were performed.   Radiation dose for this scan was reduced using automated exposure  control, adjustment of the mA and/or kV according to patient size, or  iterative reconstruction technique.    COMPARISON: None.    FINDINGS: No evidence of acute fracture or posttraumatic subluxation.  Moderate degenerative disc disease at C3-C4, C5-C6, and C6-C7.  Background of mild degenerative disc disease. Multilevel facet  arthropathy. Mild spinal canal stenoses at multiple levels related to  disc osteophyte complexes. Severe foraminal stenoses at multiple  levels bilaterally. Presumed atelectasis at the lung apices. Calcified  left upper lobe lung nodule.      Impression    IMPRESSION: No evidence of acute fracture or subluxation in the  cervical spine.    ABAD PEACE MD         SYSTEM ID:  [id number]   XR Shoulder Right G/E 3 Views    Narrative    XR SHOULDER RIGHT G/E 3 VIEWS 12/6/2022 10:26 AM     HISTORY: fall, right shoulder pain    COMPARISON: None.       Impression    IMPRESSION: Acute impacted complex proximal humerus fracture with  surgical neck and tuberosity involvement. There is surrounding soft  tissue swelling and varus angulation.    Mild glenohumeral and  acromioclavicular joint degenerative change.  Bones are demineralized.    NOTE: ABNORMAL REPORT    THE DICTATION ABOVE DESCRIBES AN ABNORMAL REPORT FOR WHICH FOLLOW-UP  IS NEEDED.    LENNY STEWART MD         SYSTEM ID:  [id number]   XR Knee Right 1/2 Views    Narrative    XR KNEE RIGHT 1/2 VIEWS 12/6/2022 10:27 AM     HISTORY: fall, knee pain    COMPARISON: None.       Impression    IMPRESSION: Subtle undulation and irregularity of the tibial plateau  does raise concern for a nondisplaced fracture. Question complex joint  effusion. Recommend CT of the knee further evaluation.    No significant joint space narrowing.    NOTE: ABNORMAL REPORT    THE DICTATION ABOVE DESCRIBES AN ABNORMAL REPORT FOR WHICH FOLLOW-UP  IS NEEDED.    LENNY STEWART MD         SYSTEM ID:  [id number]   XR Pelvis w Hip Right G/E 2 Views    Narrative    XR PELVIS AND HIP RIGHT 2 VIEWS 12/6/2022 10:27 AM     HISTORY: fall, trochanteric pain    COMPARISON: None.       Impression    IMPRESSION: There is an acute displaced and angulated  intertrochanteric right proximal femoral fracture. No hip dislocation.  Minimal underlying right hip arthrosis.    There is an additional vertical lucency seen on the AP view projecting  over the superior acetabulum. While this may be projectional, an  acetabular fracture is difficult to completely exclude on this study.    Prostate radiation seeds. Bones are demineralized.    NOTE: ABNORMAL REPORT    THE DICTATION ABOVE DESCRIBES AN ABNORMAL REPORT FOR WHICH FOLLOW-UP  IS NEEDED.    LENNY STEWART MD         SYSTEM ID:  [id number]   XR Hand Left G/E 3 Views    Narrative    XR HAND LEFT G/E 3 VIEWS 12/6/2022 10:28 AM     HISTORY: left DIP dislocation. Pain.    COMPARISON: None.       Impression    IMPRESSION: The bones are demineralized. There is no evidence of acute  fracture. No DIP joint dislocation seen involving the second through  fifth fingers on this study. No evidence of acute fracture.  Severe  first MCP joint arthrosis.    LENNY STEWART MD         SYSTEM ID:  [id number]   CT Knee Right w/o Contrast    Narrative    CT OF THE LEFT KNEE 12/6/2022 11:42 AM    INDICATION: Knee pain. Recent fall.  TECHNIQUE: Noncontrast. Axial, sagittal and coronal thin-section  reconstruction. Dose reduction techniques were used.   CONTRAST: None.    FINDINGS:   No acute fracture. Healing mildly depressed fracture of the lateral  tibial plateau. Mild degenerative changes in the medial and lateral  compartments. Osteopenia.    No joint effusion. No muscle atrophy. No soft tissue edema.      Impression    IMPRESSION:  1.  No acute fracture.    2.  Old healed mildly depressed fracture of the lateral tibial  plateau.    3.  Mild degenerative changes in the medial and lateral compartments.      LADONNA MOHAMUD MD         SYSTEM ID:  [id number]

## 2022-12-06 NOTE — ED NOTES
"[facility]  ED Nurse Handoff Report    Scott Abbasi is a 87 year old male   ED Chief complaint: Fall and Hip Pain  . ED Diagnosis:   Final diagnoses:   Displaced intertrochanteric fracture of left femur, initial encounter for closed fracture (H)   Closed fracture of proximal end of right humerus, unspecified fracture morphology, initial encounter   Closed dislocation of interphalangeal joint of left thumb, initial encounter   Fall, initial encounter   Elevated troponin   Thumb laceration, left, initial encounter     Allergies: No Known Allergies    Code Status: Full Code  Activity level - Baseline/Home:  Stand by Assist. Activity Level - Current:   Assist X 2. Lift room needed: No. Bariatric: No   Needed: No   Isolation: No. Infection: Not Applicable.     Vital Signs:   Vitals:    12/06/22 1115 12/06/22 1200 12/06/22 1215 12/06/22 1230   BP:  (!) 140/61  119/76   Pulse:  66 73 93   Resp:       Temp:       TempSrc:       SpO2: 98% 99% 94%    Weight:           Cardiac Rhythm:  ,      Pain level:    Patient confused: Yes. Patient Falls Risk: Yes.   Elimination Status: Has voided   Patient Report - Initial Complaint: Fall, hip pain. Focused Assessment: 87 year old male who presents after a fall. He reportedly had a hip fracture in June and was at a TCU. He was found on the floor by staff this morning at 0300 (has been using walker for ambulation without difficulty recently), but does have dementia. Unsure how the patient fell.      His  (now at bedside) states that she does not believe the patient ever had an operation for his \"hip fracture\".  I reviewed care everywhere briefly but I do not see any specific operative notes nor recent x-rays.  The patient's  states that this injury happened before the patient was at her home. Unknown if he hit his head.     After the  reviewed her records further, there was mention from the prior " group home that the patient had a pelvic fracture but was able to be ambulatory.     The patient answer some questions.  He seems to note discomfort at the right shoulder, hip, and indicates he is uncomfortable because with cervical collar that is in place.  There is a laceration to the patient's right thumb.   Tests Performed: Labs, imaging. Abnormal Results:   Labs Ordered and Resulted from Time of ED Arrival to Time of ED Departure   BASIC METABOLIC PANEL - Abnormal       Result Value    Sodium 138      Potassium 4.1      Chloride 103      Carbon Dioxide (CO2) 25      Anion Gap 10      Urea Nitrogen 24.0 (*)     Creatinine 0.93      Calcium 9.2      Glucose 155 (*)     GFR Estimate 80     CBC WITH PLATELETS AND DIFFERENTIAL - Abnormal    WBC Count 20.3 (*)     RBC Count 3.86 (*)     Hemoglobin 12.6 (*)     Hematocrit 38.3 (*)     MCV 99      MCH 32.6      MCHC 32.9      RDW 11.8      Platelet Count 278      % Neutrophils 90      % Lymphocytes 3      % Monocytes 6      % Eosinophils 0      % Basophils 0      % Immature Granulocytes 1      NRBCs per 100 WBC 0      Absolute Neutrophils 18.4 (*)     Absolute Lymphocytes 0.6 (*)     Absolute Monocytes 1.2      Absolute Eosinophils 0.0      Absolute Basophils 0.0      Absolute Immature Granulocytes 0.2      Absolute NRBCs 0.0     TROPONIN T, HIGH SENSITIVITY - Abnormal    Troponin T, High Sensitivity 26 (*)    TROPONIN T, HIGH SENSITIVITY - Abnormal    Troponin T, High Sensitivity 31 (*)    MAGNESIUM - Normal    Magnesium 2.0     ROUTINE UA WITH MICROSCOPIC REFLEX TO CULTURE     CT Knee Right w/o Contrast   Final Result   IMPRESSION:   1.  No acute fracture.      2.  Old healed mildly depressed fracture of the lateral tibial   plateau.      3.  Mild degenerative changes in the medial and lateral compartments.         LADONNA MOHAMUD MD            SYSTEM ID:  [id number]      XR Hand Left G/E 3 Views   Final Result   IMPRESSION: The bones are demineralized. There is no  evidence of acute   fracture. No DIP joint dislocation seen involving the second through   fifth fingers on this study. No evidence of acute fracture. Severe   first MCP joint arthrosis.      LENNY STEWART MD            SYSTEM ID:  [id number]      XR Pelvis w Hip Right G/E 2 Views   Final Result   IMPRESSION: There is an acute displaced and angulated   intertrochanteric right proximal femoral fracture. No hip dislocation.   Minimal underlying right hip arthrosis.      There is an additional vertical lucency seen on the AP view projecting   over the superior acetabulum. While this may be projectional, an   acetabular fracture is difficult to completely exclude on this study.      Prostate radiation seeds. Bones are demineralized.      NOTE: ABNORMAL REPORT      THE DICTATION ABOVE DESCRIBES AN ABNORMAL REPORT FOR WHICH FOLLOW-UP   IS NEEDED.      LENNY STEWART MD            SYSTEM ID:  [id number]      XR Knee Right 1/2 Views   Final Result   IMPRESSION: Subtle undulation and irregularity of the tibial plateau   does raise concern for a nondisplaced fracture. Question complex joint   effusion. Recommend CT of the knee further evaluation.      No significant joint space narrowing.      NOTE: ABNORMAL REPORT      THE DICTATION ABOVE DESCRIBES AN ABNORMAL REPORT FOR WHICH FOLLOW-UP   IS NEEDED.      LENNY STEWART MD            SYSTEM ID:  [id number]      XR Shoulder Right G/E 3 Views   Final Result   IMPRESSION: Acute impacted complex proximal humerus fracture with   surgical neck and tuberosity involvement. There is surrounding soft   tissue swelling and varus angulation.      Mild glenohumeral and acromioclavicular joint degenerative change.   Bones are demineralized.      NOTE: ABNORMAL REPORT      THE DICTATION ABOVE DESCRIBES AN ABNORMAL REPORT FOR WHICH FOLLOW-UP   IS NEEDED.      LENNY STEWART MD            SYSTEM ID:  [id number]      CT Cervical Spine w/o Contrast   Final Result   IMPRESSION:  No evidence of acute fracture or subluxation in the   cervical spine.      ABAD PEACE MD            SYSTEM ID:  [id number]      CT Head w/o Contrast   Final Result   IMPRESSION:  No acute abnormality identified.      ABAD PEACE MD            SYSTEM ID:  [id number]         Treatments provided: See MAR  Family Comments:  at bedside.  OBS brochure/video discussed/provided to patient:  Yes  ED Medications:   Medications   bupivacaine (PF) (MARCAINE) 0.5 % injection (has no administration in time range)   ceFAZolin (ANCEF) 2 g in 100 mL D5W intermittent infusion (0 g Intravenous Stopped 12/6/22 1112)   Tdap (tetanus-diphtheria-acell pertussis) (ADACEL) injection 0.5 mL (0.5 mLs Intramuscular Given 12/6/22 1032)     Drips infusing:  No  For the majority of the shift, the patient's behavior Green. Interventions performed were NA.    Sepsis treatment initiated: No     Patient tested for COVID 19 prior to admission: NO    ED Nurse Name/Phone Number: Sidra Velazquez RN,   12:44 PM    RECEIVING UNIT ED HANDOFF REVIEW    Above ED Nurse Handoff Report was reviewed: Yes  Reviewed by: Belén Andersen RN on December 6, 2022 at 4:40 PM

## 2022-12-06 NOTE — ED NOTES
Emergency Department Technician Wound Irrigation Note:    12/6/2022    9:17 AM      Wound location: thumb lac    Irrigation Fluid: Normal Saline    Estimated Irrigation Volume (60 mL fluid per cm): 500ml    Pt. Tolerated well    Diane Horowitz

## 2022-12-06 NOTE — ED TRIAGE NOTES
Pt at TCU/group home recovery from hip fx in June. Pt has been walking with a walker without difficulty. Pt found on the floor by staff this morning at 0300, unsure how pt fell. Laceration to left thumb, right shoulder/back/hip pain with right hip shortening. Advanced dementia, unable to answer questions. Pt given 50mcg fentanyl en route. No blood thinners. Denies neck pain.

## 2022-12-06 NOTE — ED PROVIDER NOTES
"  History     Chief Complaint:    Fall and Hip Pain      The history is provided by the EMS personnel and a caregiver (). History limited by: Dementia.      Scott Abbasi is a 87 year old male who presents after a fall. He reportedly had a hip fracture in June and was at a TCU. He was found on the floor by staff this morning at 0300 (has been using walker for ambulation without difficulty recently), but does have dementia. Unsure how the patient fell.     His  (now at bedside) states that she does not believe the patient ever had an operation for his \"hip fracture\".  I reviewed care everywhere briefly but I do not see any specific operative notes nor recent x-rays.  The patient's  states that this injury happened before the patient was at her home. Unknown if he hit his head.    After the  reviewed her records further, there was mention from the prior group home that the patient had a pelvic fracture but was able to be ambulatory.    The patient answer some questions.  He seems to note discomfort at the right shoulder, hip, and indicates he is uncomfortable because with cervical collar that is in place.  There is a laceration to the patient's right thumb.    Review of Systems   Unable to perform ROS: Dementia   Musculoskeletal: Positive for arthralgias and myalgias.   Skin: Positive for wound.       Allergies:  No Known Allergies    Medications:    Medication   ascorbic acid extended release (VITAMIN C) 1,000 mg tablet     magnesium oxide (MAG-) 400 mg tablet    Indications: Leg cramp   CPAP    Indications: IVONNE (obstructive sleep apnea)   [id number] (Macular Health Formula) 5-1-7.5 mg cap     calcium carbonate-vitamin D3, 500 mg-400 units, (OSCAL 500 + D) tablet     medication order composer     cholecalciferol, Vitamin D3, (Vitamin D-3) 5,000 unit tab tablet     Omega-3-DHA-EPA-Fish Oil 1,000 mg (120 mg-180 " mg) cap    Indications: Screening cholesterol level   wheat dextrin (Benefiber Sugar Free, dextrin,) 3 gram/4 gram     multivitamin (MVI) tablet     Zinc Gluconate 50 mg tablet    Indications: Zinc deficiency   LORazepam (Ativan) 0.5 mg tab    Indications: Situational anxiety   donepeziL (ARICEPT) 5 mg tablet    Indications: Parkinsonism, unspecified Parkinsonism type (HC), Dementia in Alzheimer's disease with early onset (HC)   sertraline (ZOLOFT) 50 mg tablet    Indications: ROSALIA (generalized anxiety disorder)          Past Medical History:   Past Surgical History:     Parkinsonism, unspecified Parkinsonism type 10/17/2022   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  Sept 2021: started on sertraline (Zoloft) by Dr. Patel.    Cognitive decline 06/26/2021   Overview:     Formatting of this note is different from the original.  June 2021: MRI of brain, [facility] visit.   August 2021: Formal neurocognitive assessment with recommendations, see clinic note.   Sept 2021: was not able to complete any significant amount of neuropsych testing.  But his cogntivie changes along with signs/symptoms of Parksinons suggest a Lewy Body Dementia. Neurology consult recommended.   Oct 2021: Donepezil started.      Screening for lipoid disorders 10/19/2012   Senile osteoporosis 10/21/2011   Overview:     Formatting of this note might be different from the original.  Was on alendronate for 7 years, still on calcium daily.   Feb 2019: Repeat DEXA, T score for left fem Neck -2.8 and Right Fem Neck -3.2, AP Spine T 0.1. Continue Vitamin D and Calcium.    Carcinoma of prostate, brachytherapy 6/14/2011 11/23/2010   Overview:     Formatting of this note is different from the original.  [id number] Elroy  Scott    Dates PSA Path/procedure Bone scan CT scan Lupron given Notes   10/10 4.3   11/10 PUB-4+4=8/10-65% - 2 cores 12/10 Neg 12.10 Neg   6/11 Brachytherapy   10/11 0.27   5/12 0.3   10/2012 0.75   5/2013 0.46   11/2013 0.43   5/2014 0.25   11/2014 0.17   5/2015 0.16   1/2016 0.13   7/2016 0.07   1/2017 0.07   7/2017 0.06   1/2018 0.04                      Phenylketonuria 10/15/2010   Severe sleep apnea 10/14/2009   Overview:     Formatting of this note might be different from the original.  Sees Dr. Roberts for follow up. Diagnosis around 2004. Severe obstructive sleep apnea.    Diaphragmatic hernia without mention of obstruction or gangrene 10/14/2009   Mental retardation 10/14/2009   Other and unspecified general psychiatric examination 10/14/2009   Overview:     Formatting of this note might be different from the original.  Colonoscopy 09/22/03 Flex sig 11/28/01 Chol 10/27/08 Psa 10/27/08       TURP   APPENDECTOMY     HERNIA REPAIR     RETINAL DETACHMENT REPAIR     COLONOSCOPY SCREENING          Patient Active Problem List    Diagnosis Date Noted     Elevated troponin 12/06/2022     Priority: Medium     Fall, initial encounter 12/06/2022     Priority: Medium     Thumb laceration, left, initial encounter 12/06/2022     Priority: Medium     Closed dislocation of interphalangeal joint of left thumb, initial encounter 12/06/2022     Priority: Medium     Closed fracture of proximal end of right humerus, unspecified fracture morphology, initial encounter 12/06/2022     Priority: Medium     Displaced intertrochanteric fracture of left femur, initial encounter for closed fracture (H) 12/06/2022     Priority: Medium          Family History  No family history on file.    Social History:  PCP: Romario Membreno  Presents to the ED via EMS    Physical Exam     Patient Vitals for the past 24 hrs:   BP Temp Temp src Pulse Resp SpO2 Weight   12/06/22 1300 134/78 -- -- 99 16 97 % --   12/06/22 1230 119/76 -- -- 93 -- -- --    12/06/22 1215 -- -- -- 73 -- 94 % --   12/06/22 1200 (!) 140/61 -- -- 66 -- 99 % --   12/06/22 1115 -- -- -- -- -- 98 % --   12/06/22 1104 (!) 146/71 -- -- -- -- 100 % --   12/06/22 1044 -- -- -- -- -- 97 % --   12/06/22 1034 139/68 -- -- -- -- -- --   12/06/22 0930 -- -- -- -- -- 97 % --   12/06/22 0915 -- -- -- -- -- 100 % --   12/06/22 0845 -- -- -- -- -- 100 % --   12/06/22 0830 (!) 140/65 -- -- 61 -- 100 % --   12/06/22 0815 -- -- -- 67 -- 98 % --   12/06/22 0800 132/59 -- -- 62 -- 100 % --   12/06/22 0748 (!) 145/59 97.6  F (36.4  C) Oral 68 16 97 % 79.5 kg (175 lb 3.2 oz)       Physical Exam  Constitutional: Vital signs reviewed as above.   Head: No external signs of trauma noted.  Eyes: Pupils are equal, round, and reactive to light.   Neck: No JVD noted  Cardiovascular: normal rate, Regular rhythm and normal heart sounds.  No murmur heard. Equal B/L peripheral pulses.  Pulmonary/Chest: Effort normal and breath sounds normal. No respiratory distress. Patient has no wheezes. Patient has no rales.   Gastrointestinal: Soft. There is no tenderness.   Musculoskeletal/Extremities: There is notable tenderness to the right shoulder as well as the right trochanteric region.  The patient seems to indicate right knee tenderness but I do not see any bruising.  The right lower extremity is shortened and externally rotated.  There does not seem to be any ASIS tenderness to compression.  There is no apparent cervical, thoracic, or lumbar midline tenderness. He has no apparent tenderness to the left thumb. The DIP joint of the thumb appears deformed and possibly dislocated.  There is a laceration over the site and I am able to see what appears to be the flexor tendon.  This appears intact. ROM is decreased to flexion (likely due to dislocation).  Neurological: Patient is alert. He knows his name. He has dementia.  Skin: Skin is warm and dry. There is no diaphoresis noted. 3 cm laceration on the right thumb (DIP joints  pace). There is a laceration over the site and I am able to see what appears to be the flexor tendon.  This appears intact.  Psychiatric: The patient appears calm.        Emergency Department Course   ECG:  ECG taken at 0835, ECG read at 0837  NSR  Left axis deviation  Minimal voltage criteria for LVH, may be normal variant  Abnormal ECG    No old ECG available for comparison.  Rate 67 bpm. TN interval 190 ms. QRS duration 96 ms. QT/QTc 446/471 ms. P-R-T axes 73 -39 56.       Imaging:  CT Knee Right w/o Contrast   Final Result   IMPRESSION:   1.  No acute fracture.      2.  Old healed mildly depressed fracture of the lateral tibial   plateau.      3.  Mild degenerative changes in the medial and lateral compartments.         LADONNA MOHAMUD MD            SYSTEM ID:  [id number]      XR Hand Left G/E 3 Views   Final Result   IMPRESSION: The bones are demineralized. There is no evidence of acute   fracture. No DIP joint dislocation seen involving the second through   fifth fingers on this study. No evidence of acute fracture. Severe   first MCP joint arthrosis.      LENNY STEWART MD            SYSTEM ID:  [id number]      XR Pelvis w Hip Right G/E 2 Views   Final Result   IMPRESSION: There is an acute displaced and angulated   intertrochanteric right proximal femoral fracture. No hip dislocation.   Minimal underlying right hip arthrosis.      There is an additional vertical lucency seen on the AP view projecting   over the superior acetabulum. While this may be projectional, an   acetabular fracture is difficult to completely exclude on this study.      Prostate radiation seeds. Bones are demineralized.      NOTE: ABNORMAL REPORT      THE DICTATION ABOVE DESCRIBES AN ABNORMAL REPORT FOR WHICH FOLLOW-UP   IS NEEDED.      LENNY STEWART MD            SYSTEM ID:  [id number]      XR Knee Right 1/2 Views   Final Result   IMPRESSION: Subtle undulation and irregularity of the tibial plateau   does raise concern for a  nondisplaced fracture. Question complex joint   effusion. Recommend CT of the knee further evaluation.      No significant joint space narrowing.      NOTE: ABNORMAL REPORT      THE DICTATION ABOVE DESCRIBES AN ABNORMAL REPORT FOR WHICH FOLLOW-UP   IS NEEDED.      LENNY STEWART MD            SYSTEM ID:  [id number]      XR Shoulder Right G/E 3 Views   Final Result   IMPRESSION: Acute impacted complex proximal humerus fracture with   surgical neck and tuberosity involvement. There is surrounding soft   tissue swelling and varus angulation.      Mild glenohumeral and acromioclavicular joint degenerative change.   Bones are demineralized.      NOTE: ABNORMAL REPORT      THE DICTATION ABOVE DESCRIBES AN ABNORMAL REPORT FOR WHICH FOLLOW-UP   IS NEEDED.      LENNY STEWART MD            SYSTEM ID:  [id number]      CT Cervical Spine w/o Contrast   Final Result   IMPRESSION: No evidence of acute fracture or subluxation in the   cervical spine.      ABAD PEACE MD            SYSTEM ID:  [id number]      CT Head w/o Contrast   Final Result   IMPRESSION:  No acute abnormality identified.      ABAD PEACE MD            SYSTEM ID:  [id number]          Laboratory:  Labs Ordered and Resulted from Time of ED Arrival to Time of ED Departure   BASIC METABOLIC PANEL - Abnormal       Result Value    Sodium 138      Potassium 4.1      Chloride 103      Carbon Dioxide (CO2) 25      Anion Gap 10      Urea Nitrogen 24.0 (*)     Creatinine 0.93      Calcium 9.2      Glucose 155 (*)     GFR Estimate 80     CBC WITH PLATELETS AND DIFFERENTIAL - Abnormal    WBC Count 20.3 (*)     RBC Count 3.86 (*)     Hemoglobin 12.6 (*)     Hematocrit 38.3 (*)     MCV 99      MCH 32.6      MCHC 32.9      RDW 11.8      Platelet Count 278      % Neutrophils 90      % Lymphocytes 3      % Monocytes 6      % Eosinophils 0      % Basophils 0      % Immature Granulocytes 1      NRBCs per 100 WBC 0      Absolute Neutrophils 18.4 (*)     Absolute  Lymphocytes 0.6 (*)     Absolute Monocytes 1.2      Absolute Eosinophils 0.0      Absolute Basophils 0.0      Absolute Immature Granulocytes 0.2      Absolute NRBCs 0.0     TROPONIN T, HIGH SENSITIVITY - Abnormal    Troponin T, High Sensitivity 26 (*)    TROPONIN T, HIGH SENSITIVITY - Abnormal    Troponin T, High Sensitivity 31 (*)    MAGNESIUM - Normal    Magnesium 2.0     ROUTINE UA WITH MICROSCOPIC REFLEX TO CULTURE   VITAMIN D DEFICIENCY SCREENING       Procedures:    Laceration Repair      Procedure: Laceration Repair    Indication: Laceration    Consent: Verbal    Location: Left L first (thumb) finger    Length: 3 cm    Preparation: Irrigation with Sterile Saline.    Anesthesia/Sedation: Digital Block: A digital block was performed with Bupivacaine - 0.5% on the affected digit.      Treatment/Exploration: Wound explored, no foreign bodies found     Closure: The wound was closed with one layer. Skin/superficial layer was closed with 3 x 4-0 Nylon using Horizontal mattress sutures.     Patient Status: The patient tolerated the procedure well: Yes. There were no complications.      Dislocation Reduction   Procedure: Dislocation Reduction  Consent: Verbal   Indication: Dislocated L first (thumb) finger   Location: Left L first (thumb) finger  Anesthesia/Sedation: Digital Block: A digital block was performed with Bupivacaine - 0.5% on the affected digit.  Procedure Detail: I manipulated the joint.  This included recreating the pattern of injury (dorsiflexion), and traction along with pressure in the distal direction at the DIP joint.  A pop was felt and the thumb was felt to be back in anatomic alignment with normal range of motion.   Immobilized with Prefab splint   Post procedure assessment:  Gross deformity resolved   Patient Status: The patient tolerated the procedure well: Yes. There were no complications.    Emergency Department Course:           Reviewed:    I reviewed nursing notes, vitals and past  history    Assessments/Consults:   ED Course as of 12/06/22 1437   Tue Dec 06, 2022   1353 The patient was evaluated.   0832 Digital block applied on left thumb.   1048 D/W Maria Elena (PA with [facility]). Likely surgery tomorrow. Patient and  updated.   1103 D/W Dr. Stein. Ok for admit.   1107 Updated patient and  again  (plan for CT of the right knee)       Interventions:  Medications   bupivacaine (PF) (MARCAINE) 0.5 % injection (has no administration in time range)   lidocaine 1 % 0.1-1 mL (has no administration in time range)   lidocaine (LMX4) cream (has no administration in time range)   sodium chloride (PF) 0.9% PF flush 3 mL (3 mLs Intracatheter Not Given 12/6/22 1414)   sodium chloride (PF) 0.9% PF flush 3 mL (has no administration in time range)   melatonin tablet 1 mg (has no administration in time range)   heparin ANTICOAGULANT injection 5,000 Units (has no administration in time range)   acetaminophen (TYLENOL) tablet 975 mg (975 mg Oral Given 12/6/22 1403)   HYDROmorphone (DILAUDID) half-tab 1 mg (has no administration in time range)   HYDROmorphone (DILAUDID) injection 0.2 mg (0.2 mg Intravenous Given 12/6/22 1403)   docusate sodium (COLACE) capsule 100 mg (has no administration in time range)   senna-docusate (SENOKOT-S/PERICOLACE) 8.6-50 MG per tablet 1 tablet (has no administration in time range)     Or   senna-docusate (SENOKOT-S/PERICOLACE) 8.6-50 MG per tablet 2 tablet (has no administration in time range)   bisacodyl (DULCOLAX) suppository 10 mg (has no administration in time range)   magnesium hydroxide (MILK OF MAGNESIA) suspension 30 mL (has no administration in time range)   sodium phosphate (FLEET ENEMA) 1 enema (has no administration in time range)   ondansetron (ZOFRAN ODT) ODT tab 4 mg (has no administration in time range)     Or   ondansetron (ZOFRAN) injection 4 mg (has no administration in time range)   prochlorperazine (COMPAZINE) injection 5 mg (has no  administration in time range)     Or   prochlorperazine (COMPAZINE) tablet 5 mg (has no administration in time range)     Or   prochlorperazine (COMPAZINE) suppository 12.5 mg (has no administration in time range)   lactated ringers infusion ( Intravenous New Bag 12/6/22 1318)   Vitamin D3 (CHOLECALCIFEROL) tablet 50 mcg (has no administration in time range)   ceFAZolin (ANCEF) 2 g in 100 mL D5W intermittent infusion (0 g Intravenous Stopped 12/6/22 1112)   Tdap (tetanus-diphtheria-acell pertussis) (ADACEL) injection 0.5 mL (0.5 mLs Intramuscular Given 12/6/22 1032)       Disposition:  The patient was admitted to the hospital under the care of Dr. Stein.    Impression & Plan      CMS Diagnoses:         Medical Decision Making:  This 87-year-old male patient presents to the ED from his group home after a fall.  Please see the HPI and exam for specifics.  A broad differential was considered.  This included but was not limited to cardiac causes, syncope, mechanical falls, fractures, etc.  The above work-up was undertaken.    Laboratory findings are most significant for an elevated troponin.  Repeat troponin appears roughly flat.  EKG does not appear ischemic and the patient denies chest pain.    Imaging findings are notable for right intertrochanteric femur fracture and right proximal humerus fracture.  The patient also had  left thumb dislocation that I reduced in the room prior to imaging.     The laceration on the patient's left thumb was anesthetized, cleansed, and repaired as above.  He tolerated this well.    I talked with orthopedic surgery and the hospitalist service and we will plan admitting the patient to the hospital for further monitoring and care with likely operative intervention tomorrow.    Diagnosis:    ICD-10-CM    1. Displaced intertrochanteric fracture of left femur, initial encounter for closed fracture (H)  S72.142A Case Request: INTERNAL FIXATION RIGHT HIP USING INTRAMEDULLARY SAL, IRRIGATION  AND DEBRIDEMENT LEFT THUMB     Case Request: INTERNAL FIXATION RIGHT HIP USING INTRAMEDULLARY SAL, IRRIGATION AND DEBRIDEMENT LEFT THUMB      2. Closed fracture of proximal end of right humerus, unspecified fracture morphology, initial encounter  S42.201A       3. Closed dislocation of interphalangeal joint of left thumb, initial encounter  S63.125A Case Request: INTERNAL FIXATION RIGHT HIP USING INTRAMEDULLARY SAL, IRRIGATION AND DEBRIDEMENT LEFT THUMB     Case Request: INTERNAL FIXATION RIGHT HIP USING INTRAMEDULLARY SAL, IRRIGATION AND DEBRIDEMENT LEFT THUMB      4. Fall, initial encounter  W19.XXXA       5. Elevated troponin  R77.8       6. Thumb laceration, left, initial encounter  S61.012A           Discharge Medications:  New Prescriptions    No medications on file              Nick Hoff, DO  12/06/22 [id number]

## 2022-12-07 ENCOUNTER — DOCUMENTATION ONLY (OUTPATIENT)
Dept: OTHER | Facility: CLINIC | Age: 87
End: 2022-12-07

## 2022-12-07 ENCOUNTER — APPOINTMENT (OUTPATIENT)
Dept: GENERAL RADIOLOGY | Facility: CLINIC | Age: 87
DRG: 480 | End: 2022-12-07
Attending: STUDENT IN AN ORGANIZED HEALTH CARE EDUCATION/TRAINING PROGRAM
Payer: COMMERCIAL

## 2022-12-07 ENCOUNTER — ANESTHESIA (OUTPATIENT)
Dept: SURGERY | Facility: CLINIC | Age: 87
DRG: 480 | End: 2022-12-07
Payer: COMMERCIAL

## 2022-12-07 ENCOUNTER — ANESTHESIA EVENT (OUTPATIENT)
Dept: SURGERY | Facility: CLINIC | Age: 87
DRG: 480 | End: 2022-12-07
Payer: COMMERCIAL

## 2022-12-07 LAB
ABO/RH(D): NORMAL
ANION GAP SERPL CALCULATED.3IONS-SCNC: 9 MMOL/L (ref 7–15)
ANTIBODY SCREEN: NEGATIVE
ATRIAL RATE - MUSE: 67 BPM
BUN SERPL-MCNC: 24.8 MG/DL (ref 8–23)
CALCIUM SERPL-MCNC: 8.6 MG/DL (ref 8.8–10.2)
CHLORIDE SERPL-SCNC: 102 MMOL/L (ref 98–107)
CREAT SERPL-MCNC: 0.9 MG/DL (ref 0.67–1.17)
CREAT SERPL-MCNC: 0.99 MG/DL (ref 0.67–1.17)
DEPRECATED HCO3 PLAS-SCNC: 23 MMOL/L (ref 22–29)
DIASTOLIC BLOOD PRESSURE - MUSE: NORMAL MMHG
ERYTHROCYTE [DISTWIDTH] IN BLOOD BY AUTOMATED COUNT: 11.8 % (ref 10–15)
GFR SERPL CREATININE-BSD FRML MDRD: 74 ML/MIN/1.73M2
GFR SERPL CREATININE-BSD FRML MDRD: 83 ML/MIN/1.73M2
GLUCOSE SERPL-MCNC: 145 MG/DL (ref 70–99)
HCT VFR BLD AUTO: 30.4 % (ref 40–53)
HGB BLD-MCNC: 10.2 G/DL (ref 13.3–17.7)
INTERPRETATION ECG - MUSE: NORMAL
MCH RBC QN AUTO: 33 PG (ref 26.5–33)
MCHC RBC AUTO-ENTMCNC: 33.6 G/DL (ref 31.5–36.5)
MCV RBC AUTO: 98 FL (ref 78–100)
P AXIS - MUSE: 73 DEGREES
PLATELET # BLD AUTO: 220 10E3/UL (ref 150–450)
POTASSIUM SERPL-SCNC: 4.6 MMOL/L (ref 3.4–5.3)
PR INTERVAL - MUSE: 190 MS
QRS DURATION - MUSE: 96 MS
QT - MUSE: 446 MS
QTC - MUSE: 471 MS
R AXIS - MUSE: -39 DEGREES
RBC # BLD AUTO: 3.09 10E6/UL (ref 4.4–5.9)
SODIUM SERPL-SCNC: 134 MMOL/L (ref 136–145)
SPECIMEN EXPIRATION DATE: NORMAL
SYSTOLIC BLOOD PRESSURE - MUSE: NORMAL MMHG
T AXIS - MUSE: 56 DEGREES
VENTRICULAR RATE- MUSE: 67 BPM
WBC # BLD AUTO: 15.9 10E3/UL (ref 4–11)

## 2022-12-07 PROCEDURE — 258N000003 HC RX IP 258 OP 636

## 2022-12-07 PROCEDURE — 710N000009 HC RECOVERY PHASE 1, LEVEL 1, PER MIN: Performed by: STUDENT IN AN ORGANIZED HEALTH CARE EDUCATION/TRAINING PROGRAM

## 2022-12-07 PROCEDURE — 85027 COMPLETE CBC AUTOMATED: CPT | Performed by: STUDENT IN AN ORGANIZED HEALTH CARE EDUCATION/TRAINING PROGRAM

## 2022-12-07 PROCEDURE — 250N000011 HC RX IP 250 OP 636

## 2022-12-07 PROCEDURE — 272N000001 HC OR GENERAL SUPPLY STERILE: Performed by: STUDENT IN AN ORGANIZED HEALTH CARE EDUCATION/TRAINING PROGRAM

## 2022-12-07 PROCEDURE — 999N000141 HC STATISTIC PRE-PROCEDURE NURSING ASSESSMENT: Performed by: STUDENT IN AN ORGANIZED HEALTH CARE EDUCATION/TRAINING PROGRAM

## 2022-12-07 PROCEDURE — C1713 ANCHOR/SCREW BN/BN,TIS/BN: HCPCS | Performed by: STUDENT IN AN ORGANIZED HEALTH CARE EDUCATION/TRAINING PROGRAM

## 2022-12-07 PROCEDURE — 36415 COLL VENOUS BLD VENIPUNCTURE: CPT | Performed by: STUDENT IN AN ORGANIZED HEALTH CARE EDUCATION/TRAINING PROGRAM

## 2022-12-07 PROCEDURE — 86901 BLOOD TYPING SEROLOGIC RH(D): CPT | Performed by: PHYSICIAN ASSISTANT

## 2022-12-07 PROCEDURE — 360N000084 HC SURGERY LEVEL 4 W/ FLUORO, PER MIN: Performed by: STUDENT IN AN ORGANIZED HEALTH CARE EDUCATION/TRAINING PROGRAM

## 2022-12-07 PROCEDURE — 82310 ASSAY OF CALCIUM: CPT | Performed by: STUDENT IN AN ORGANIZED HEALTH CARE EDUCATION/TRAINING PROGRAM

## 2022-12-07 PROCEDURE — 250N000011 HC RX IP 250 OP 636: Performed by: STUDENT IN AN ORGANIZED HEALTH CARE EDUCATION/TRAINING PROGRAM

## 2022-12-07 PROCEDURE — 250N000013 HC RX MED GY IP 250 OP 250 PS 637: Performed by: STUDENT IN AN ORGANIZED HEALTH CARE EDUCATION/TRAINING PROGRAM

## 2022-12-07 PROCEDURE — 999N000065 XR FEMUR PORT RIGHT 2 VIEWS: Mod: RT

## 2022-12-07 PROCEDURE — 0QS606Z REPOSITION RIGHT UPPER FEMUR WITH INTRAMEDULLARY INTERNAL FIXATION DEVICE, OPEN APPROACH: ICD-10-PCS | Performed by: STUDENT IN AN ORGANIZED HEALTH CARE EDUCATION/TRAINING PROGRAM

## 2022-12-07 PROCEDURE — C1769 GUIDE WIRE: HCPCS | Performed by: STUDENT IN AN ORGANIZED HEALTH CARE EDUCATION/TRAINING PROGRAM

## 2022-12-07 PROCEDURE — 99233 SBSQ HOSP IP/OBS HIGH 50: CPT | Performed by: STUDENT IN AN ORGANIZED HEALTH CARE EDUCATION/TRAINING PROGRAM

## 2022-12-07 PROCEDURE — 999N000179 XR SURGERY CARM FLUORO LESS THAN 5 MIN W STILLS: Mod: TC

## 2022-12-07 PROCEDURE — 250N000009 HC RX 250

## 2022-12-07 PROCEDURE — 370N000017 HC ANESTHESIA TECHNICAL FEE, PER MIN: Performed by: STUDENT IN AN ORGANIZED HEALTH CARE EDUCATION/TRAINING PROGRAM

## 2022-12-07 PROCEDURE — 258N000003 HC RX IP 258 OP 636: Performed by: STUDENT IN AN ORGANIZED HEALTH CARE EDUCATION/TRAINING PROGRAM

## 2022-12-07 PROCEDURE — 250N000009 HC RX 250: Performed by: STUDENT IN AN ORGANIZED HEALTH CARE EDUCATION/TRAINING PROGRAM

## 2022-12-07 PROCEDURE — 86850 RBC ANTIBODY SCREEN: CPT | Performed by: PHYSICIAN ASSISTANT

## 2022-12-07 PROCEDURE — 82565 ASSAY OF CREATININE: CPT | Performed by: STUDENT IN AN ORGANIZED HEALTH CARE EDUCATION/TRAINING PROGRAM

## 2022-12-07 PROCEDURE — 120N000001 HC R&B MED SURG/OB

## 2022-12-07 PROCEDURE — 0JBK0ZZ EXCISION OF LEFT HAND SUBCUTANEOUS TISSUE AND FASCIA, OPEN APPROACH: ICD-10-PCS | Performed by: STUDENT IN AN ORGANIZED HEALTH CARE EDUCATION/TRAINING PROGRAM

## 2022-12-07 DEVICE — SCREW BN 36MM 5MM LCK X25 IM NL 04.045.036S: Type: IMPLANTABLE DEVICE | Site: LEG | Status: FUNCTIONAL

## 2022-12-07 DEVICE — IMP NAL SYN CAN FEM PROX TFNA 12X170MM 130D 04.037.242S: Type: IMPLANTABLE DEVICE | Status: FUNCTIONAL

## 2022-12-07 DEVICE — IMP SCR SYN TFNA FENESTRATED LAG 100MM 04.038.200S: Type: IMPLANTABLE DEVICE | Site: LEG | Status: FUNCTIONAL

## 2022-12-07 RX ORDER — PROCHLORPERAZINE MALEATE 5 MG
5 TABLET ORAL EVERY 6 HOURS PRN
Status: DISCONTINUED | OUTPATIENT
Start: 2022-12-07 | End: 2022-12-07

## 2022-12-07 RX ORDER — METOPROLOL TARTRATE 1 MG/ML
1-2 INJECTION, SOLUTION INTRAVENOUS EVERY 5 MIN PRN
Status: DISCONTINUED | OUTPATIENT
Start: 2022-12-07 | End: 2022-12-07 | Stop reason: HOSPADM

## 2022-12-07 RX ORDER — NALOXONE HYDROCHLORIDE 0.4 MG/ML
0.2 INJECTION, SOLUTION INTRAMUSCULAR; INTRAVENOUS; SUBCUTANEOUS
Status: DISCONTINUED | OUTPATIENT
Start: 2022-12-07 | End: 2022-12-21 | Stop reason: HOSPADM

## 2022-12-07 RX ORDER — TRANEXAMIC ACID 10 MG/ML
1 INJECTION, SOLUTION INTRAVENOUS ONCE
Status: DISCONTINUED | OUTPATIENT
Start: 2022-12-07 | End: 2022-12-07 | Stop reason: HOSPADM

## 2022-12-07 RX ORDER — AMOXICILLIN 250 MG
1 CAPSULE ORAL 2 TIMES DAILY
Status: DISCONTINUED | OUTPATIENT
Start: 2022-12-07 | End: 2022-12-17

## 2022-12-07 RX ORDER — SODIUM CHLORIDE, SODIUM LACTATE, POTASSIUM CHLORIDE, CALCIUM CHLORIDE 600; 310; 30; 20 MG/100ML; MG/100ML; MG/100ML; MG/100ML
INJECTION, SOLUTION INTRAVENOUS CONTINUOUS
Status: DISCONTINUED | OUTPATIENT
Start: 2022-12-07 | End: 2022-12-07 | Stop reason: HOSPADM

## 2022-12-07 RX ORDER — CEFAZOLIN SODIUM 1 G/3ML
INJECTION, POWDER, FOR SOLUTION INTRAMUSCULAR; INTRAVENOUS PRN
Status: DISCONTINUED | OUTPATIENT
Start: 2022-12-07 | End: 2022-12-07

## 2022-12-07 RX ORDER — ONDANSETRON 2 MG/ML
INJECTION INTRAMUSCULAR; INTRAVENOUS PRN
Status: DISCONTINUED | OUTPATIENT
Start: 2022-12-07 | End: 2022-12-07

## 2022-12-07 RX ORDER — LIDOCAINE 40 MG/G
CREAM TOPICAL
Status: DISCONTINUED | OUTPATIENT
Start: 2022-12-07 | End: 2022-12-07 | Stop reason: HOSPADM

## 2022-12-07 RX ORDER — NALOXONE HYDROCHLORIDE 0.4 MG/ML
0.4 INJECTION, SOLUTION INTRAMUSCULAR; INTRAVENOUS; SUBCUTANEOUS
Status: DISCONTINUED | OUTPATIENT
Start: 2022-12-07 | End: 2022-12-21 | Stop reason: HOSPADM

## 2022-12-07 RX ORDER — CEFAZOLIN SODIUM/WATER 2 G/20 ML
2 SYRINGE (ML) INTRAVENOUS SEE ADMIN INSTRUCTIONS
Status: DISCONTINUED | OUTPATIENT
Start: 2022-12-07 | End: 2022-12-07 | Stop reason: HOSPADM

## 2022-12-07 RX ORDER — ONDANSETRON 4 MG/1
4 TABLET, ORALLY DISINTEGRATING ORAL EVERY 30 MIN PRN
Status: DISCONTINUED | OUTPATIENT
Start: 2022-12-07 | End: 2022-12-07 | Stop reason: HOSPADM

## 2022-12-07 RX ORDER — HYDRALAZINE HYDROCHLORIDE 20 MG/ML
2.5-5 INJECTION INTRAMUSCULAR; INTRAVENOUS EVERY 10 MIN PRN
Status: DISCONTINUED | OUTPATIENT
Start: 2022-12-07 | End: 2022-12-07 | Stop reason: HOSPADM

## 2022-12-07 RX ORDER — FENTANYL CITRATE 50 UG/ML
25 INJECTION, SOLUTION INTRAMUSCULAR; INTRAVENOUS EVERY 5 MIN PRN
Status: DISCONTINUED | OUTPATIENT
Start: 2022-12-07 | End: 2022-12-07 | Stop reason: HOSPADM

## 2022-12-07 RX ORDER — PROPOFOL 10 MG/ML
INJECTION, EMULSION INTRAVENOUS PRN
Status: DISCONTINUED | OUTPATIENT
Start: 2022-12-07 | End: 2022-12-07

## 2022-12-07 RX ORDER — BISACODYL 10 MG
10 SUPPOSITORY, RECTAL RECTAL DAILY PRN
Status: DISCONTINUED | OUTPATIENT
Start: 2022-12-07 | End: 2022-12-21 | Stop reason: HOSPADM

## 2022-12-07 RX ORDER — ONDANSETRON 2 MG/ML
4 INJECTION INTRAMUSCULAR; INTRAVENOUS EVERY 30 MIN PRN
Status: DISCONTINUED | OUTPATIENT
Start: 2022-12-07 | End: 2022-12-07 | Stop reason: HOSPADM

## 2022-12-07 RX ORDER — HYDROMORPHONE HCL IN WATER/PF 6 MG/30 ML
0.4 PATIENT CONTROLLED ANALGESIA SYRINGE INTRAVENOUS EVERY 5 MIN PRN
Status: DISCONTINUED | OUTPATIENT
Start: 2022-12-07 | End: 2022-12-07 | Stop reason: HOSPADM

## 2022-12-07 RX ORDER — DEXMEDETOMIDINE HYDROCHLORIDE 4 UG/ML
INJECTION, SOLUTION INTRAVENOUS CONTINUOUS PRN
Status: DISCONTINUED | OUTPATIENT
Start: 2022-12-07 | End: 2022-12-07

## 2022-12-07 RX ORDER — DEXAMETHASONE SODIUM PHOSPHATE 4 MG/ML
INJECTION, SOLUTION INTRA-ARTICULAR; INTRALESIONAL; INTRAMUSCULAR; INTRAVENOUS; SOFT TISSUE PRN
Status: DISCONTINUED | OUTPATIENT
Start: 2022-12-07 | End: 2022-12-07

## 2022-12-07 RX ORDER — NEOSTIGMINE METHYLSULFATE 1 MG/ML
VIAL (ML) INJECTION PRN
Status: DISCONTINUED | OUTPATIENT
Start: 2022-12-07 | End: 2022-12-07

## 2022-12-07 RX ORDER — ONDANSETRON 4 MG/1
4 TABLET, ORALLY DISINTEGRATING ORAL EVERY 6 HOURS PRN
Status: DISCONTINUED | OUTPATIENT
Start: 2022-12-07 | End: 2022-12-07

## 2022-12-07 RX ORDER — ENOXAPARIN SODIUM 100 MG/ML
40 INJECTION SUBCUTANEOUS EVERY 24 HOURS
Status: DISCONTINUED | OUTPATIENT
Start: 2022-12-08 | End: 2022-12-12

## 2022-12-07 RX ORDER — GLYCOPYRROLATE 0.2 MG/ML
INJECTION, SOLUTION INTRAMUSCULAR; INTRAVENOUS PRN
Status: DISCONTINUED | OUTPATIENT
Start: 2022-12-07 | End: 2022-12-07

## 2022-12-07 RX ORDER — MAGNESIUM HYDROXIDE 1200 MG/15ML
LIQUID ORAL PRN
Status: DISCONTINUED | OUTPATIENT
Start: 2022-12-07 | End: 2022-12-07 | Stop reason: HOSPADM

## 2022-12-07 RX ORDER — CEFAZOLIN SODIUM/WATER 2 G/20 ML
2 SYRINGE (ML) INTRAVENOUS
Status: DISCONTINUED | OUTPATIENT
Start: 2022-12-07 | End: 2022-12-07 | Stop reason: HOSPADM

## 2022-12-07 RX ORDER — KETOROLAC TROMETHAMINE 15 MG/ML
15 INJECTION, SOLUTION INTRAMUSCULAR; INTRAVENOUS
Status: DISCONTINUED | OUTPATIENT
Start: 2022-12-07 | End: 2022-12-07 | Stop reason: HOSPADM

## 2022-12-07 RX ORDER — CEFAZOLIN SODIUM 1 G/3ML
1 INJECTION, POWDER, FOR SOLUTION INTRAMUSCULAR; INTRAVENOUS EVERY 8 HOURS
Status: COMPLETED | OUTPATIENT
Start: 2022-12-07 | End: 2022-12-08

## 2022-12-07 RX ORDER — FENTANYL CITRATE 50 UG/ML
INJECTION, SOLUTION INTRAMUSCULAR; INTRAVENOUS PRN
Status: DISCONTINUED | OUTPATIENT
Start: 2022-12-07 | End: 2022-12-07

## 2022-12-07 RX ORDER — FENTANYL CITRATE 50 UG/ML
50 INJECTION, SOLUTION INTRAMUSCULAR; INTRAVENOUS EVERY 5 MIN PRN
Status: DISCONTINUED | OUTPATIENT
Start: 2022-12-07 | End: 2022-12-07 | Stop reason: HOSPADM

## 2022-12-07 RX ORDER — LIDOCAINE HYDROCHLORIDE 10 MG/ML
INJECTION, SOLUTION INFILTRATION; PERINEURAL PRN
Status: DISCONTINUED | OUTPATIENT
Start: 2022-12-07 | End: 2022-12-07

## 2022-12-07 RX ORDER — BUPIVACAINE HYDROCHLORIDE AND EPINEPHRINE 2.5; 5 MG/ML; UG/ML
INJECTION, SOLUTION EPIDURAL; INFILTRATION; INTRACAUDAL; PERINEURAL PRN
Status: DISCONTINUED | OUTPATIENT
Start: 2022-12-07 | End: 2022-12-07 | Stop reason: HOSPADM

## 2022-12-07 RX ORDER — POLYETHYLENE GLYCOL 3350 17 G/17G
17 POWDER, FOR SOLUTION ORAL DAILY
Status: DISCONTINUED | OUTPATIENT
Start: 2022-12-08 | End: 2022-12-10

## 2022-12-07 RX ORDER — LIDOCAINE 40 MG/G
CREAM TOPICAL
Status: DISCONTINUED | OUTPATIENT
Start: 2022-12-07 | End: 2022-12-21 | Stop reason: HOSPADM

## 2022-12-07 RX ORDER — SODIUM CHLORIDE, SODIUM LACTATE, POTASSIUM CHLORIDE, CALCIUM CHLORIDE 600; 310; 30; 20 MG/100ML; MG/100ML; MG/100ML; MG/100ML
INJECTION, SOLUTION INTRAVENOUS CONTINUOUS
Status: DISCONTINUED | OUTPATIENT
Start: 2022-12-07 | End: 2022-12-08

## 2022-12-07 RX ORDER — ONDANSETRON 2 MG/ML
4 INJECTION INTRAMUSCULAR; INTRAVENOUS EVERY 6 HOURS PRN
Status: DISCONTINUED | OUTPATIENT
Start: 2022-12-07 | End: 2022-12-07

## 2022-12-07 RX ADMIN — LIDOCAINE HYDROCHLORIDE 50 MG: 10 INJECTION, SOLUTION INFILTRATION; PERINEURAL at 09:02

## 2022-12-07 RX ADMIN — PHENYLEPHRINE HYDROCHLORIDE 200 MCG: 10 INJECTION INTRAVENOUS at 09:04

## 2022-12-07 RX ADMIN — ACETAMINOPHEN 975 MG: 325 TABLET, FILM COATED ORAL at 21:56

## 2022-12-07 RX ADMIN — DEXAMETHASONE SODIUM PHOSPHATE 8 MG: 4 INJECTION, SOLUTION INTRA-ARTICULAR; INTRALESIONAL; INTRAMUSCULAR; INTRAVENOUS; SOFT TISSUE at 09:02

## 2022-12-07 RX ADMIN — ROCURONIUM BROMIDE 50 MG: 50 INJECTION, SOLUTION INTRAVENOUS at 09:02

## 2022-12-07 RX ADMIN — GLYCOPYRROLATE 0.6 MG: 0.2 INJECTION, SOLUTION INTRAMUSCULAR; INTRAVENOUS at 10:00

## 2022-12-07 RX ADMIN — DONEPEZIL HYDROCHLORIDE 5 MG: 5 TABLET ORAL at 21:53

## 2022-12-07 RX ADMIN — PHENYLEPHRINE HYDROCHLORIDE 100 MCG: 10 INJECTION INTRAVENOUS at 09:23

## 2022-12-07 RX ADMIN — SENNOSIDES AND DOCUSATE SODIUM 1 TABLET: 50; 8.6 TABLET ORAL at 19:27

## 2022-12-07 RX ADMIN — CEFAZOLIN 1 G: 1 INJECTION, POWDER, FOR SOLUTION INTRAMUSCULAR; INTRAVENOUS at 17:22

## 2022-12-07 RX ADMIN — SODIUM CHLORIDE, POTASSIUM CHLORIDE, SODIUM LACTATE AND CALCIUM CHLORIDE: 600; 310; 30; 20 INJECTION, SOLUTION INTRAVENOUS at 15:12

## 2022-12-07 RX ADMIN — TRANEXAMIC ACID 1 G: 1 INJECTION, SOLUTION INTRAVENOUS at 09:10

## 2022-12-07 RX ADMIN — TRANEXAMIC ACID 1 G: 1 INJECTION, SOLUTION INTRAVENOUS at 09:58

## 2022-12-07 RX ADMIN — HYDROMORPHONE HYDROCHLORIDE 1 MG: 2 TABLET ORAL at 03:07

## 2022-12-07 RX ADMIN — PROPOFOL 150 MG: 10 INJECTION, EMULSION INTRAVENOUS at 09:02

## 2022-12-07 RX ADMIN — NEOSTIGMINE METHYLSULFATE 4 MG: 1 INJECTION, SOLUTION INTRAVENOUS at 10:00

## 2022-12-07 RX ADMIN — Medication 0.5 MCG/KG/HR: at 09:15

## 2022-12-07 RX ADMIN — ONDANSETRON HYDROCHLORIDE 4 MG: 2 INJECTION, SOLUTION INTRAVENOUS at 09:54

## 2022-12-07 RX ADMIN — HYDROMORPHONE HYDROCHLORIDE 0.2 MG: 0.2 INJECTION, SOLUTION INTRAMUSCULAR; INTRAVENOUS; SUBCUTANEOUS at 06:24

## 2022-12-07 RX ADMIN — GLYCOPYRROLATE 0.2 MG: 0.2 INJECTION, SOLUTION INTRAMUSCULAR; INTRAVENOUS at 09:02

## 2022-12-07 RX ADMIN — FENTANYL CITRATE 100 MCG: 50 INJECTION, SOLUTION INTRAMUSCULAR; INTRAVENOUS at 09:02

## 2022-12-07 RX ADMIN — CEFAZOLIN 2 G: 1 INJECTION, POWDER, FOR SOLUTION INTRAMUSCULAR; INTRAVENOUS at 08:55

## 2022-12-07 RX ADMIN — DOCUSATE SODIUM 100 MG: 100 CAPSULE, LIQUID FILLED ORAL at 19:27

## 2022-12-07 ASSESSMENT — ACTIVITIES OF DAILY LIVING (ADL)
ADLS_ACUITY_SCORE: 43
ADLS_ACUITY_SCORE: 35
ADLS_ACUITY_SCORE: 43
ADLS_ACUITY_SCORE: 43
ADLS_ACUITY_SCORE: 35
ADLS_ACUITY_SCORE: 43

## 2022-12-07 NOTE — ANESTHESIA PROCEDURE NOTES
Femoral Procedure Note    Pre-Procedure   Staff -        Anesthesiologist:  Tai Wray MD       Performed By: anesthesiologist       Location: OR       Procedure Start/Stop Times: 12/7/2022 10:17 AM and 12/7/2022 10:28 AM       Pre-Anesthestic Checklist: patient identified, IV checked, site marked, risks and benefits discussed, informed consent, monitors and equipment checked, pre-op evaluation, at physician/surgeon's request and post-op pain management  Timeout:       Correct Patient: Yes        Correct Procedure: Yes        Correct Site: Yes        Correct Position: Yes        Correct Laterality: Yes        Site Marked: Yes  Procedure Documentation  Procedure: Femoral       Laterality: right       Patient Position: supine       Patient Prep/Sterile Barriers: sterile gloves, mask       Skin prep: Chloraprep       Needle Type: insulated and short bevel       Needle Gauge: 22.        Needle Length (Inches): 2        Ultrasound guided       1. Ultrasound was used to identify targeted nerve, plexus, vascular marker, or fascial plane and place a needle adjacent to it in real-time.       2. Ultrasound was used to visualize the spread of anesthetic in close proximity to the above referenced structure.    Assessment/Narrative         The placement was negative for: blood aspirated, painful injection and site bleeding       Paresthesias: No.       Bolus given via needle. no blood aspirated via catheter.        Secured via.        Insertion/Infusion Method: Single Shot       Complications: none    Medication(s) Administered   Medication Administration Time: 12/7/2022 10:17 AM     Comments:  30ml of 0.5% Bupivicaine w/ 1:200,000 epi injected    The surgeon has given a verbal order transferring care of this patient to me for the performance of a regional analgesia block for post-op pain control. It is requested of me because I am uniquely trained and qualified to perform this block and the surgeon is neither trained nor  "qualified to perform this procedure.            FOR [facility] ONLY:   Pain Team Contact information: please page the Pain Team Via [employer]. Search \"Pain\". During daytime hours, please page the attending first. At night please page the resident first.    "

## 2022-12-07 NOTE — PLAN OF CARE
Pt alert to self. VSS, on RA. Pt seemed to be in pain when arriving to floor @1720. PRN dilaudid given. Voiding using urinal. Decreased appetite, NPO at midnight. Lt thumb splint in place. Rt shoulder and hip swollen. IVF running.  and niece in room with pt. Continue bedrest and pain management. Possible surgery tomorrow morning.

## 2022-12-07 NOTE — PLAN OF CARE
INPATIENT NOTE: [phone]    PRIMARY PROBLEM: Fall, R Thumb laceration, R hip fx      Orientation: Pt is Alert to self only.   Neuro: Confusion, decreased mobility RUE & BLE.   Pain Status: appears comfortable at rest, cries out w/ movement. PRN dilaudid given.   Activity: Bedrest  Cardiac: WDL, denies CP  GI: No BM this shift, BS active x4 quadrants, denies nausea.  : Intermittently incontinent, otherwise voiding w/ bedside urinal  Skin: Redness/ bruising on R hand/finger/thumb. Surgical bath done.  LDA: PIV  Infusions: LR @ 75 ml/hr   Diet: NPO for surgery this AM  Consults: Ortho  Discharge Plan: Surgery this am, pending medical clearance    No adverse events this shift, Surgical bath done. R thumb in splint. Surgery scheduled for 8 am. Will continue to monitor.    /55 (BP Location: Right arm)   Pulse 87   Temp (!) 96.7  F (35.9  C) (Temporal)   Resp 16   Wt 79.5 kg (175 lb 3.2 oz)   SpO2 95%

## 2022-12-07 NOTE — CONSULTS
Care Management Initial Consult    General Information  Assessment completed with: Family, Patient,    Type of CM/SW Visit: Initial Assessment    Primary Care Provider verified and updated as needed: Yes   Readmission within the last 30 days:        Reason for Consult: discharge planning  Advance Care Planning:            Communication Assessment  Patient's communication style: spoken language (English or Bilingual)    Hearing Difficulty or Deaf: no   Wear Glasses or Blind: yes    Cognitive  Cognitive/Neuro/Behavioral: .WDL except  Level of Consciousness: confused  Arousal Level: opens eyes spontaneously  Orientation: disoriented to, place, time, situation  Mood/Behavior: calm, cooperative  Best Language: 0 - No aphasia  Speech: slow    Living Environment:   People in home: other (see comments) (Group Home)     Current living Arrangements: group home      Able to return to prior arrangements: other (see comments)  Living Arrangement Comments: May need therapy prior to pt return home    Family/Social Support:  Care provided by:    Provides care for: no one, unable/limited ability to care for self  Marital Status: Single  Guardian, Other (specify) (Niece)          Description of Support System: Supportive, Involved    Support Assessment: Adequate family and caregiver support    Current Resources:   Patient receiving home care services: No     Community Resources: Group Home, Other (see comment) ([facility] [phone])  Equipment currently used at home: walker, standard, walker, rolling, shower chair, grab bar, toilet  Supplies currently used at home: Oxygen Tubing/Supplies    Employment/Financial:  Employment Status:          Financial Concerns:             Lifestyle & Psychosocial Needs:  Social Determinants of Health     Tobacco Use: Not on file   Alcohol Use: Not on file   Financial Resource Strain: Not on file   Food Insecurity: Not on file   Transportation Needs: Not on file   Physical Activity: Not on  file   Stress: Not on file   Social Connections: Not on file   Intimate Partner Violence: Not on file   Depression: Not on file   Housing Stability: Not on file       Functional Status:  Prior to admission patient needed assistance:              Mental Health Status:          Chemical Dependency Status:                Values/Beliefs:  Spiritual, Cultural Beliefs, Alevism Practices, Values that affect care:                 Additional Information:    Met with pt/guardians at bedside to discuss discharge planning. Pt guardians provided a copy of their guardianship paperwork to this writer which was emailed to [facility]rae for verification. Pt guardians explain that pt resides in a Group Home and has a CM Moses [phone]. Pt guardians explain that pt has a developmental delay in which this writer explained that pt will trigger for an OBRA level II. This writer will complete a PAS once pt has met with therapy/if TCU is the recommendation in order to get the process moving. Pt guardians explain that if TCU is the recommendation they would like [facility] or [facility] as first choices, they otherwise would like to stick as close to pt home as possible. Will await PT recommendations and will follow for discharge planning.     CHARAN Akbar, [facility]  Inpatient Care Coordination  Ortho/Spine Unit  [phone]  Cheryl High, [facility]

## 2022-12-07 NOTE — PLAN OF CARE
Alert to self. VSS. On RA. Reported pain but unable to rate pain level however is comfortable in bed. Scheduled Tylenol given. Voiding via urinal. NPO at midnight for scheduled surgery tomorrow at 0730. Calm and pleasant with care. Will continue to provide supportive care.

## 2022-12-07 NOTE — OP NOTE
[facility]  Orthopedic Operative Note    Scott Abbasi MRN# [id number]   YOB: 1935  Procedure Date:  12/7/2022  Age: 87 year old     PREOPERATIVE DIAGNOSIS:    1. Intertrochanteric femur fracture right  2. Left open thumb DIP joint fracture/dislocation  3. Right proximal humerus fracture    POSTOPERATIVE DIAGNOSIS:    Same     PROCEDURE PERFORMED:    1. Surgical treatment of right intertrochanteric femur fracture with cephalomedullary device  2. Left thumb DIP joint irrigation and excisional debridement    SURGEON:  ROOPA PEDERSON MD    FIRST ASSISTANT:   1. Armando Haynes Rn- First Assist, A skilled first assistant was necessary for this procedure for assistance with patient positioning, prepping, draping, surgical visualization, wound closure, and application of the dressing.  2 .NESTOR Torrez    ANESTHESIA:  General + FI block    EBL: 100cc    COMPLICATIONS: None     DISPOSITION: Post Anesthesia Care Unit     CONDITION: Stable     INDICATIONS:  Scott Abbasi  is a 87 year old male with a history of Parkinson's disease and dementia who presents after a fall at his group home.  He was brought to [facility] emergency department where x-rays were obtained and revealed a right intertrochanteric femur fracture, right proximal humerus fracture, and a left DIP joint fracture dislocation.  He was admitted to medicine for further optimization and orthopedic surgery was consulted for further management.    I spoke with his legal guardian, Irina who is also his niece.  We discussed possible treatment options related to his injuries including operative and nonoperative intervention along with the risks and benefits and potential recovery.  After thorough discussion, elected to proceed with ORIF with intramedullary nail and is right intertrochanteric femur fracture, nonoperative management of his right proximal humerus fracture, irrigation and  debridement right DIP joint fracture dislocation.  His right thumb was previously reduced in the emergency dept.     Risks of surgery discussed included but not limited to bleeding, infection, damage to surrounding neurovascular structures, leg length inequality, nonunion, malunion, need for revision surgery, blood clots, pulmonary embolus, and the medical risks of anesthesia.  Benefits of surgery discussed included improved chance of union in acceptable alignment, improve function, and reduction in medical risks of hip fractures.  Alternatives discussed included nonoperative management which I did not recommend.      The patient's guardian acknowledges risks and wishes to proceed. The informed consent was signed and documented.  I met with the patient preoperatively and marked the operative extremity.      IMPLANTS:  Implant Name Type Inv. Item Serial No.  Lot No. LRB No. Used Action   IMP NAL SYN CAN FEM PROX TFNA [id number] 130D 04.037.242S - [id number] Metallic Hardware/Nunez IMP NAL SYN CAN FEM PROX TFNA [id number] 130D 04.037.242S  [employer] [id number] Right 1 Implanted   IMP SCR SYN TFNA FENESTRATED LAG 100MM 04.038.200S - [id number] Metallic Hardware/Nunez IMP SCR SYN TFNA FENESTRATED LAG 100MM 04.038.200S  SYNTHES-[employer] [id number] Right 1 Implanted   SCREW BN 36MM 5MM LCK X25 IM NL 04.045.036S - [id number] Metallic Hardware/Nunez SCREW BN 36MM 5MM LCK X25 IM NL 04.045.036S  SYNTHES-STRATEC [id number] Right 1 Implanted       PROCEDURE:   The patient was brought into the operating room and placed on operating table.  The patient underwent general anesthesia.  The patient was positioned supine on a Kimberlyn table with the contralateral leg hyperextended.  All bony prominences were well-padded.  The operative extremity was cleansed with Hibiclens. The operative extremity was then prepped with ChloraPrep.  The surgical team scrubbed in.  A WHO timeout was conducted to verify correct patient, correct  extremity, presence of the surgeon's initials on the operative extremity, and administration of IV antibiotics, in this case Ancef.     Right IT hip fracture Cephalomedullary nail   Prior to incision, the patients intertrochanteric hip fracture was reduced utilizing traction from the Kimberlyn table.      Fluoroscopy was brought in to confirm good start point.  A incision was made just proximal to the greater tuberosity.  The starting guidepin was advanced down to the start point on the greater trochanter.  We confirmed the start point on fluoroscopy and then advanced the guidewire into the femur.  The position of the guidewire was confirmed with fluoroscopy.  The greater trochanter with a reamer. All instruments were removed.  A Synthes 130 degree x 170 cm x 12 mm nail was selected.  This was advanced to an appropriate depth, confirmed by fluoroscopy.       We then advanced the guide for the lag screw down to bone through a small lateral thigh incision.   The guidewire was then advanced into the central position within the femoral neck and head confirmed by fluoroscopy.  We then measured for and selected an appropriate length lag screw, 100 mm.     Prior to reaming an additional derotational guide wire was placed to prevent any rotation of the femoral head/neck with placement of the lag screw    Next the reamer was utilized over the guide wire and then the lag screw was placed by hand.      Prior to placing the set screw, compression at the fracture site was achieved through the anju guide. This was done under fluoroscopy with approximately 5 mm of compression.     The setscrew was advanced and tightened.  The guide for the interlocking screw was advanced through the jig down to bone through a small lateral thigh incision.  We then drilled for and placed one statically interlocked distal interlocking screw.  Good bicortical purchase was achieved.      All instruments were then removed.  Final fluoroscopic imaging  was obtained demonstrating good alignment of the fracture good positioning of all hardware.  We then thoroughly irrigated and closed in layers with 2-0 Monocryl, and 3-0 Monocryl on the skin.  Dermabond was used on the wounds.  The wounds were anesthetized with bupivacaine and sterile dressings were applied.    Left thumb DIP joint I&D  Of the left hand was prepped with ChloraPrep and sterilely draped.  Prior sutures were removed.  The patient had a transverse incision along the volar aspect of his DIP joint, approximately 3 cm in length.  The wound was examined.  Devitalized and unhealthy tissue was removed.  Tissue including skin, subcutaneous tissue, and fascia that was devitalized and unhealthy was removed.  The patient's FPL tendon was identified and noted to be intact.  The DIP joint was noted to be well reduced.  The DIP joint was then irrigated with saline.  Following irrigation, the wound was again reexamined and found to be clean.  A wound was closed with 4-0 nylon sutures.  It was then dressed with Xeroform, gauze Coban and a aluminum foam splint.     All instruments were accounted for at the end of the case. The patient awoke from anesthesia and was transferred to the PACU in stable condition.    PLAN:    Postoperatively:   1.  Ancef x 24 hours.   2.  Lovenox for DVT prophylaxis.   3.   PACU x-rays.   4.  Weightbearing as tolerated with a walker x 6 weeks.     5.  Physical therapy/occupational therapy.   6.  Keep dressing on for 2 weeks.  OK to shower.  No submerging wound.  7.  Osteoporosis workup and treatment with primary care provider within 2 months.   8.  Discharge:  Case management social work consult for placement  9.  Thumb sutures removed in 2 weeks. Keep splint on until suture removal     FOLLOWUP:     1.  Plan 2-week wound check with x-rays (AP and Lateral Xrays).  This could also be done at patients rehab facility with x-rays sent to me at [facility].   2.  Eight-week followup with  X-rays.     Please call as soon as possible to make an appointment to be seen in Dr. Carlos Horowitz's clinic in 2 weeks.      Dr. Horowitz's care coordinator is Sherice Arellano. Please contact her at [phone] to schedule an appointment.       Dr. Horowitz sees patients at 2 clinic locations:    [facility]  ? [address] [place] [address]    [facility]   ? [address] , [address], [place] [address]        Please call the on-call phone number [phone] during evenings, nights and weekends for any urgent needs. Prescription refills must be done during business hours by calling [phone]        Carlos Horowitz MD  [facility]

## 2022-12-07 NOTE — PROGRESS NOTES
SPIRITUAL HEALTH SERVICES Progress Note  RH Pre-Op    Saw pt Scott Abbasi per his request for  support before his procedure.  His niece Irina was present.  Scott, who is Hoahaoism, welcomed prayer.    Plan: Informed pt and his niece how they can request further  support once pt has returned to the floor.  This author and other chaplains remain available per pt/family request.    Artemio Rosales M.Div., [facility]  Staff   Phone [phone]

## 2022-12-07 NOTE — ANESTHESIA POSTPROCEDURE EVALUATION
Patient: Scott Abbasi    Procedure: Procedure(s):  Open reduction internal fixation intramedullary rodding, right hip fracture  Irrigation and debridement, left thumb       Anesthesia Type:  General    Note:  Disposition: Outpatient   Postop Pain Control: Uneventful            Sign Out: Well controlled pain   PONV: No   Neuro/Psych: Uneventful            Sign Out: Acceptable/Baseline neuro status   Airway/Respiratory: Uneventful            Sign Out: Acceptable/Baseline resp. status   CV/Hemodynamics: Uneventful            Sign Out: Acceptable CV status; No obvious hypovolemia; No obvious fluid overload   Other NRE: NONE   DID A NON-ROUTINE EVENT OCCUR? No           Last vitals:  Vitals Value Taken Time   /85 12/07/22 1145   Temp 97.7  F (36.5  C) 12/07/22 1045   Pulse 85 12/07/22 1152   Resp 18 12/07/22 1152   SpO2 97 % 12/07/22 1156   Vitals shown include unvalidated device data.    Electronically Signed By: Tai Wray MD  December 7, 2022  3:31 PM

## 2022-12-07 NOTE — ANESTHESIA PREPROCEDURE EVALUATION
Anesthesia Pre-Procedure Evaluation    Patient: Scott Abbasi   MRN: [id number] : 1935        Procedure : Procedure(s):  Open reduction internal fixation intramedullary rodding, right hip fracture  Irrigation and debridement, left thumb          No past medical history on file.   No past surgical history on file.   No Known Allergies   Social History     Tobacco Use     Smoking status: Not on file     Smokeless tobacco: Not on file   Substance Use Topics     Alcohol use: Not on file      Wt Readings from Last 1 Encounters:   22 79.5 kg (175 lb 3.2 oz)        Anesthesia Evaluation   Pt has had prior anesthetic. Type: General.        ROS/MED HX  ENT/Pulmonary:     (+) sleep apnea, severe, uses CPAP,     Neurologic:     (+) dementia, Parkinson's disease, Developmental delay,     Cardiovascular:  - neg cardiovascular ROS     METS/Exercise Tolerance:     Hematologic: Comments: Lab Test        22                       0625          0753          WBC          15.9*        20.3*         HGB          10.2*        12.6*         MCV          98           99            PLT          220          278            Lab Test        22                       0625          0753          NA           134*         138           POTASSIUM    4.6          4.1           CHLORIDE     102          103           CO2          23           25            BUN          24.8*        24.0*         CR           0.90         0.93          ANIONGAP     9            10            FLORENTINO          8.6*         9.2           GLC          145*         155*              (+) anemia,     Musculoskeletal: Comment: Open reduction internal fixation intramedullary rodding, right hip fracture (Right: Leg)       Irrigation and debridement, left thumb      GI/Hepatic:  - neg GI/hepatic ROS     Renal/Genitourinary:  - neg Renal ROS     Endo:  - neg endo ROS     Psychiatric/Substance Use:  - neg psychiatric ROS    (+) psychiatric history anxiety     Infectious Disease:  - neg infectious disease ROS     Malignancy:  - neg malignancy ROS     Other:  - neg other ROS          Physical Exam    Airway        Mallampati: II   TM distance: > 3 FB   Neck ROM: full   Mouth opening: > 3 cm    Respiratory Devices and Support         Dental  no notable dental history         Cardiovascular   cardiovascular exam normal          Pulmonary   pulmonary exam normal                OUTSIDE LABS:  CBC:   Lab Results   Component Value Date    WBC 15.9 (H) 12/07/2022    WBC 20.3 (H) 12/06/2022    HGB 10.2 (L) 12/07/2022    HGB 12.6 (L) 12/06/2022    HCT 30.4 (L) 12/07/2022    HCT 38.3 (L) 12/06/2022     12/07/2022     12/06/2022     BMP:   Lab Results   Component Value Date     (L) 12/07/2022     12/06/2022    POTASSIUM 4.6 12/07/2022    POTASSIUM 4.1 12/06/2022    CHLORIDE 102 12/07/2022    CHLORIDE 103 12/06/2022    CO2 23 12/07/2022    CO2 25 12/06/2022    BUN 24.8 (H) 12/07/2022    BUN 24.0 (H) 12/06/2022    CR 0.90 12/07/2022    CR 0.93 12/06/2022     (H) 12/07/2022     (H) 12/06/2022     COAGS:   Lab Results   Component Value Date    PTT 28 06/14/2011    INR 1.03 06/14/2011     POC: No results found for: BGM, HCG, HCGS  HEPATIC: No results found for: ALBUMIN, PROTTOTAL, ALT, AST, GGT, ALKPHOS, BILITOTAL, BILIDIRECT, DAMEON  OTHER:   Lab Results   Component Value Date    FLORENTINO 8.6 (L) 12/07/2022    MAG 2.0 12/06/2022       Anesthesia Plan    ASA Status:  3      Anesthesia Type: General.     - Airway: ETT   Induction: Intravenous, Propofol.   Maintenance: Balanced.        Consents    Anesthesia Plan(s) and associated risks, benefits, and realistic alternatives discussed. Questions answered and patient/representative(s) expressed understanding.    - Discussed:     - Discussed with:  Patient      - Extended Intubation/Ventilatory Support Discussed: No.      - Patient is DNR/DNI Status: No    Use of blood  products discussed: Yes.     - Discussed with: Patient.     - Consented: consented to blood products            Reason for refusal: other.     Postoperative Care    Pain management: IV analgesics.   PONV prophylaxis: Ondansetron (or other 5HT-3), Dexamethasone or Solumedrol     Comments:                Tai Wray MD

## 2022-12-07 NOTE — ACP (ADVANCE CARE PLANNING)
SPIRITUAL HEALTH SERVICES Progress Note   RH Advance Directive Education    Responded to a consult order for assistance with Advance Care Planning (ACP) for Scott Abbasi.      Scott has a history of dementia and mental retardation, per documentation.  Confirmed with his niece Irina, who is also one of Scott's guardians, that he is not his own medical decision maker.      This author and other chaplains remain available per pt/family request.    Artemio Rosales M.Div., [facility]  Staff   Phone [phone]

## 2022-12-07 NOTE — PLAN OF CARE
Goal Outcome Evaluation:      Plan of Care Reviewed With: patient    Overall Patient Progress: improvingOverall Patient Progress: improving         Patient vital signs are at baseline: Yes  Patient able to ambulate as they were prior to admission or with assist devices provided by therapies during their stay:  No, PT will be seeing pt. 12/8.  states fears of using a walker due to confusion. Pt. Uses walker at baseline.  Patient MUST void prior to discharge:  No,  Reason:  Tay in place  Patient able to tolerate oral intake:  No,  Reason: ADAT, pt. Has only had aysha crackers thus far.   Pain has adequate pain control using Oral analgesics:  Yes, pt. Denies pain at this time. Nonverbal pain cues being met.   Does patient have an identified :  Yes, Irina pham at bedside.   Has goal D/C date and time been discussed with patient:  Yes,  at bedside at has talked to case manage today. Discharge plan is TCU.     VSS. Afebrile. 2 LPM oxygen via NC. R hip dressing CDI. L thumb has a splint and coband in place. Tay in place. Pt. Uses a CPAP at night. Popsicle, apple juice, and aysha crackers tolerated well. Pt. Is sleeping on and off. No nonverbal signs of pain this shift.

## 2022-12-07 NOTE — ANESTHESIA CARE TRANSFER NOTE
Patient: Scott Abbasi    Procedure: Procedure(s):  Open reduction internal fixation intramedullary rodding, right hip fracture  Irrigation and debridement, left thumb       Diagnosis: Displaced intertrochanteric fracture of left femur, initial encounter for closed fracture (H) [S72.142A]  Closed dislocation of interphalangeal joint of left thumb, initial encounter [S63.125A]  Diagnosis Additional Information: No value filed.    Anesthesia Type:   General     Note:    Oropharynx: oral airway in place  Level of Consciousness: drowsy  Oxygen Supplementation: face mask  Level of Supplemental Oxygen (L/min / FiO2): 6  Independent Airway: airway patency satisfactory and stable  Dentition: dentition unchanged  Vital Signs Stable: post-procedure vital signs reviewed and stable  Report to RN Given: handoff report given  Patient transferred to: PACU    Handoff Report: Identifed the Patient, Identified the Reponsible Provider, Reviewed the pertinent medical history, Discussed the surgical course, Reviewed Intra-OP anesthesia mangement and issues during anesthesia, Set expectations for post-procedure period and Allowed opportunity for questions and acknowledgement of understanding      Vitals:  Vitals Value Taken Time   /94 12/07/22 1045   Temp     Pulse 96 12/07/22 1045   Resp 25 12/07/22 1048   SpO2 100 % 12/07/22 1048   Vitals shown include unvalidated device data.    Electronically Signed By: MARÍA Cobb CRNA  December 7, 2022  10:49 AM

## 2022-12-07 NOTE — BRIEF OP NOTE
[facility]    Brief Operative Note    Pre-operative diagnosis: Displaced intertrochanteric fracture of left femur, initial encounter for closed fracture (H) [H20.845A]  Closed dislocation of interphalangeal joint of left thumb, initial encounter [F79.530A]  Post-operative diagnosis Same as pre-operative diagnosis    Procedure: Procedure(s):  Open reduction internal fixation intramedullary rodding, right hip fracture  Irrigation and debridement, left thumb  Surgeon: Surgeon(s) and Role:     * Carlos Horowitz MD - Primary  Anesthesia: General with Block   Estimated Blood Loss: 100cc    Drains: None  Specimens: * No specimens in log *  Findings:   None.  Complications: None.  Implants:   Implant Name Type Inv. Item Serial No.  Lot No. LRB No. Used Action   IMP NAL SYN CAN FEM PROX TFNA [id number] 130D 04.037.242S - [id number] Metallic Hardware/[employer] IMP NAL SYN CAN FEM PROX TFNA [id number] 130D 04.037.242S  [id number] Right 1 Implanted   IMP SCR SYN TFNA FENESTRATED LAG 100MM 04.038.200S - [id number] Metallic Hardware/[employer] IMP SCR SYN TFNA FENESTRATED LAG 100MM 04.038.200S  [employer]NTHES[employer]AT[employer] [id number] Right 1 Implanted   SCREW BN 36MM 5MM LCK X25 IM NL 04.045.036S - [id number] Metallic Hardware/[employer] SCREW BN 36MM 5MM LCK X25 IM NL 04.045.036S  [employer] [id number] Right 1 Implanted     Postoperatively:   1.  Ancef x 24 hours.   2.  Lovenox for DVT prophylaxis.   3.   PACU x-rays.   4.  Weightbearing as tolerated with a walker x 6 weeks.     5.  Physical therapy/occupational therapy.   6.  Keep dressing on for 2 weeks.  OK to shower.  No submerging wound.  7.  Osteoporosis workup and treatment with primary care provider within 2 months.   8.  Discharge:  Case management social work consult for placement  9.  Thumb sutures removed in 2 weeks. Keep splint on until suture removal     FOLLOWUP:     1.  Plan 2-week wound check with x-rays (AP and Lateral Xrays).  This  could also be done at patients rehab facility with x-rays sent to me at [facility].   2.  Eight-week followup with X-rays.    Please call as soon as possible to make an appointment to be seen in Dr. Carlos Horowitz's clinic in 2 weeks.     Dr. Horowitz's care coordinator is Sherice Arellano. Please contact her at [phone] to schedule an appointment.      Dr. Horowitz sees patients at 2 clinic locations:    [facility]  o [address] [place] [address]    [facility]   o [address], [address], [place] [address]      Please call the on-call phone number [phone] during evenings, nights and weekends for any urgent needs. Prescription refills must be done during business hours by calling [phone]    Carlos Horowitz MD  [facility]

## 2022-12-07 NOTE — PROGRESS NOTES
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    Initial vitals: Temperature 97.6  F, heart rate 68, blood pressure 145/59, oxygen saturation of 97% on room air.  Right shoulder x-ray: Acute impacted complex proximal humerus fracture with surgical neck and tuberosity involved with surrounding tissue swelling and varus angulation.  Pelvis and right hip x-ray: Acute displaced and angulated intertrochanteric proximal femoral fracture, acetabular fracture could not be ruled out.  Right knee x-ray: Subtle dilation and irregularity of trouble plate 2, concerning for nondisplaced fracture, CT of the knee recommended.  X-ray of the left hand shows left thumb DIP dislocation.    Orthopedic surgery was consulted and the patient is now status post surgical fix of the right hip.    Problem List/Assessment and Plan:   Fall with polytrauma:  Unwitnessed fall.  Patient was reportedly up and trying to move around with his CPAP machine still in place.  Had a subsequent fall.  Suspect mechanical given story.  Unfortunately, this is complicated by right hip fracture, right humeral fracture, possible right knee fracture.  Orthopedic surgery was consulted in the emergency department the patient is now status post surgical fix in the right hip and left thumb I&D.  Are planning for conservative cares regarding the right humeral fracture.  -Orthopedic surgery following, appreciate recommendations   -WBAT with a walker x6 weeks   -Lovenox for DVT prophylaxis   -Keep dressing on for 2 weeks, okay for shower   -Osteoporosis  work-up and treatment with PCP within 2 months   -Thumb sutures to be removed in 2 weeks.  Continue thumb splint until suture removal  -PT/OT  -Social work consultation for possible placement    Leukocytosis: Suspect stress demargination in the setting of fall with multiple fractures.  Improving on recheck.  No evidence for infection.    Elevated troponin: Suspect type II demand ischemia in the setting of fall with multiple fractures.  EKG unremarkable.  No chest pain or evidence of ACS.    Parkinson's disease, Lewy body dementia: Continue PTA Aricept.  Does not appear to be on any Parkinson's disease medications.    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    Severe IVONNE: PTA CPAP    ROSALIA: Continue PTA Zoloft    Age-related macular degeneration      DVT Prophylaxis: Enoxaparin (Lovenox) SQ  Code Status: DNR / DNI, confirmed with patient's legal guardian, Irina, at bedside  Discharge Dispo/Date: Anticipate discharge in the next 1 to 2 days pending PT evaluation, social work consultation, Ortho clearance, adequate pain control.        Interval History (Subjective):      Patient is seen immediately postoperatively.  He is awake and responding to questions.  He denies any significant pain, however, the guardian at bedside reports that he is a stoic efrain and would never complain of pain and has difficulty verbalizing symptoms, wants and needs.    The patient presented full code.  This was discussed with legal guardians on admission.  They called back the night of 12/6 and requested the patient be made DNR/DNI.  I discussed this with patient's legal guardian, Irina at bedside who confirms that the patient should be DNR/DNI.  She understands that no attempt will be made at resuscitation if the patient's heart were to stop.  She also understands that we would not intubate and place the patient on a ventilator for any respiratory failure.  At baseline, the patient is  reportedly oriented to self and usually place.  Is usually disoriented to time                  Physical Exam:      Last Vital Signs:  BP (!) 155/71 (BP Location: Left leg, Patient Position: Semi-Walton's, Cuff Size: Adult Regular)   Pulse 60   Temp 97.8  F (36.6  C) (Temporal)   Resp 16   Wt 79.5 kg (175 lb 3.2 oz)   SpO2 100%       Intake/Output Summary (Last 24 hours) at 12/7/2022 1331  Last data filed at 12/7/2022 1100  Gross per 24 hour   Intake 800 ml   Output 1500 ml   Net -700 ml       General: Alert, awake, no acute distress.  HEENT: NC/AT, eyes anicteric, external occular movements intact, face symmetric.   Cardiac: RRR, S1, S2.  No murmurs appreciated.  Pulmonary: Normal work of breathing.  Lungs CTAB.  Abdomen: soft, non-tender, non-distended.  Bowel sounds present.  No guarding.  Extremities: no deformities.  Warm, well perfused.  Dressing overlying the right hip is clean dry and intact.  Right shoulder is swollen, bruised.  Distal extremities are warm and well-perfused.  Skin: no rashes or lesions noted.  Warm and dry.  Neuro: No gross deficits noted.  Speech clear.    Psych: Appropriate affect.         Medications:      All current medications were reviewed with changes reflected in problem list.         Data:      All new lab and imaging data was reviewed.   Labs:       Lab Results   Component Value Date     12/07/2022     12/06/2022    Lab Results   Component Value Date    CHLORIDE 102 12/07/2022    CHLORIDE 103 12/06/2022    Lab Results   Component Value Date    BUN 24.8 12/07/2022    BUN 24.0 12/06/2022      Lab Results   Component Value Date    POTASSIUM 4.6 12/07/2022    POTASSIUM 4.1 12/06/2022    Lab Results   Component Value Date    CO2 23 12/07/2022    CO2 25 12/06/2022    Lab Results   Component Value Date    CR 0.99 12/07/2022    CR 0.90 12/07/2022    CR 0.93 12/06/2022        Recent Labs   Lab 12/07/22  0625 12/06/22  0753   WBC 15.9* 20.3*   HGB 10.2* 12.6*   HCT 30.4*  38.3*   MCV 98 99    278      Imaging:   Recent Results (from the past 48 hour(s))   CT Head w/o Contrast    Narrative    CT SCAN OF THE HEAD WITHOUT CONTRAST   12/6/2022 8:58 AM     HISTORY: Unwitnessed fall, dementia.    TECHNIQUE:  Axial images of the head and coronal reformations without  IV contrast material. Radiation dose for this scan was reduced using  automated exposure control, adjustment of the mA and/or kV according  to patient size, or iterative reconstruction technique.    COMPARISON: None.    FINDINGS:  Suboptimal patient positioning. No evidence of hemorrhage.  Volume loss and patchy/confluent white matter hypoattenuation which  likely represents advanced chronic small vessel ischemic change. No  acute osseous anomaly.      Impression    IMPRESSION:  No acute abnormality identified.    ABAD PEACE MD         SYSTEM ID:  [id number]   CT Cervical Spine w/o Contrast    Narrative    CT CERVICAL SPINE WITHOUT CONTRAST   12/6/2022 8:59 AM     HISTORY: Unwitnessed fall, dementia.     TECHNIQUE: Axial images of the cervical spine were obtained without  intravenous contrast. Multiplanar reformations were performed.   Radiation dose for this scan was reduced using automated exposure  control, adjustment of the mA and/or kV according to patient size, or  iterative reconstruction technique.    COMPARISON: None.    FINDINGS: No evidence of acute fracture or posttraumatic subluxation.  Moderate degenerative disc disease at C3-C4, C5-C6, and C6-C7.  Background of mild degenerative disc disease. Multilevel facet  arthropathy. Mild spinal canal stenoses at multiple levels related to  disc osteophyte complexes. Severe foraminal stenoses at multiple  levels bilaterally. Presumed atelectasis at the lung apices. Calcified  left upper lobe lung nodule.      Impression    IMPRESSION: No evidence of acute fracture or subluxation in the  cervical spine.    ABAD PEACE MD         SYSTEM ID:  [id number]   XR  Shoulder Right G/E 3 Views    Narrative    XR SHOULDER RIGHT G/E 3 VIEWS 12/6/2022 10:26 AM     HISTORY: fall, right shoulder pain    COMPARISON: None.       Impression    IMPRESSION: Acute impacted complex proximal humerus fracture with  surgical neck and tuberosity involvement. There is surrounding soft  tissue swelling and varus angulation.    Mild glenohumeral and acromioclavicular joint degenerative change.  Bones are demineralized.    NOTE: ABNORMAL REPORT    THE DICTATION ABOVE DESCRIBES AN ABNORMAL REPORT FOR WHICH FOLLOW-UP  IS NEEDED.    LENNY STEWART MD         SYSTEM ID:  [id number]   XR Knee Right 1/2 Views    Narrative    XR KNEE RIGHT 1/2 VIEWS 12/6/2022 10:27 AM     HISTORY: fall, knee pain    COMPARISON: None.       Impression    IMPRESSION: Subtle undulation and irregularity of the tibial plateau  does raise concern for a nondisplaced fracture. Question complex joint  effusion. Recommend CT of the knee further evaluation.    No significant joint space narrowing.    NOTE: ABNORMAL REPORT    THE DICTATION ABOVE DESCRIBES AN ABNORMAL REPORT FOR WHICH FOLLOW-UP  IS NEEDED.    LENNY STEWART MD         SYSTEM ID:  [id number]   XR Pelvis w Hip Right G/E 2 Views    Narrative    XR PELVIS AND HIP RIGHT 2 VIEWS 12/6/2022 10:27 AM     HISTORY: fall, trochanteric pain    COMPARISON: None.       Impression    IMPRESSION: There is an acute displaced and angulated  intertrochanteric right proximal femoral fracture. No hip dislocation.  Minimal underlying right hip arthrosis.    There is an additional vertical lucency seen on the AP view projecting  over the superior acetabulum. While this may be projectional, an  acetabular fracture is difficult to completely exclude on this study.    Prostate radiation seeds. Bones are demineralized.    NOTE: ABNORMAL REPORT    THE DICTATION ABOVE DESCRIBES AN ABNORMAL REPORT FOR WHICH FOLLOW-UP  IS NEEDED.    LENNY STEWART MD         SYSTEM ID:  [id number]   XR  Hand Left G/E 3 Views    Narrative    XR HAND LEFT G/E 3 VIEWS 12/6/2022 10:28 AM     HISTORY: left DIP dislocation. Pain.    COMPARISON: None.       Impression    IMPRESSION: The bones are demineralized. There is no evidence of acute  fracture. No DIP joint dislocation seen involving the second through  fifth fingers on this study. No evidence of acute fracture. Severe  first MCP joint arthrosis.    LENNY STEWART MD         SYSTEM ID:  [id number]   CT Knee Right w/o Contrast    Narrative    CT OF THE LEFT KNEE 12/6/2022 11:42 AM    INDICATION: Knee pain. Recent fall.  TECHNIQUE: Noncontrast. Axial, sagittal and coronal thin-section  reconstruction. Dose reduction techniques were used.   CONTRAST: None.    FINDINGS:   No acute fracture. Healing mildly depressed fracture of the lateral  tibial plateau. Mild degenerative changes in the medial and lateral  compartments. Osteopenia.    No joint effusion. No muscle atrophy. No soft tissue edema.      Impression    IMPRESSION:  1.  No acute fracture.    2.  Old healed mildly depressed fracture of the lateral tibial  plateau.    3.  Mild degenerative changes in the medial and lateral compartments.      LADONNA MOHAMUD MD         SYSTEM ID:  [id number]   XR Surgery LUCY L/T 5 Min Fluoro w Stills    Narrative    This exam was marked as non-reportable because it will not be read by a   radiologist or a [place] non-radiologist provider.         XR Femur Port Right 2 Views    Narrative    FEMUR PORTABLE RIGHT TWO VIEWS December 7, 2022 12:13 PM     INDICATION: Right hip postoperative follow-up.     COMPARISON: 12/6/2022.      Impression    IMPRESSION:  1.  Interval open reduction internal fixation right proximal femur  fracture with intramedullary nail and femoral neck screw fixation. The  hardware is intact.  2.  Postoperative soft tissue gas in the right thigh.  3.  No joint malalignment.  4.  Atherosclerotic calcification.    JANETTE VICTORIA MD         SYSTEM ID:   [id number]

## 2022-12-08 ENCOUNTER — APPOINTMENT (OUTPATIENT)
Dept: PHYSICAL THERAPY | Facility: CLINIC | Age: 87
DRG: 480 | End: 2022-12-08
Attending: STUDENT IN AN ORGANIZED HEALTH CARE EDUCATION/TRAINING PROGRAM
Payer: COMMERCIAL

## 2022-12-08 LAB
ANION GAP SERPL CALCULATED.3IONS-SCNC: 8 MMOL/L (ref 7–15)
BUN SERPL-MCNC: 23 MG/DL (ref 8–23)
CALCIUM SERPL-MCNC: 8.4 MG/DL (ref 8.8–10.2)
CHLORIDE SERPL-SCNC: 103 MMOL/L (ref 98–107)
CREAT SERPL-MCNC: 0.86 MG/DL (ref 0.67–1.17)
DEPRECATED HCO3 PLAS-SCNC: 24 MMOL/L (ref 22–29)
ERYTHROCYTE [DISTWIDTH] IN BLOOD BY AUTOMATED COUNT: 11.9 % (ref 10–15)
GFR SERPL CREATININE-BSD FRML MDRD: 84 ML/MIN/1.73M2
GLUCOSE SERPL-MCNC: 162 MG/DL (ref 70–99)
HCT VFR BLD AUTO: 25.8 % (ref 40–53)
HGB BLD-MCNC: 8.5 G/DL (ref 13.3–17.7)
MCH RBC QN AUTO: 32.8 PG (ref 26.5–33)
MCHC RBC AUTO-ENTMCNC: 32.9 G/DL (ref 31.5–36.5)
MCV RBC AUTO: 100 FL (ref 78–100)
PLATELET # BLD AUTO: 198 10E3/UL (ref 150–450)
POTASSIUM SERPL-SCNC: 4.2 MMOL/L (ref 3.4–5.3)
RBC # BLD AUTO: 2.59 10E6/UL (ref 4.4–5.9)
SODIUM SERPL-SCNC: 135 MMOL/L (ref 136–145)
WBC # BLD AUTO: 17.6 10E3/UL (ref 4–11)

## 2022-12-08 PROCEDURE — 36415 COLL VENOUS BLD VENIPUNCTURE: CPT | Performed by: STUDENT IN AN ORGANIZED HEALTH CARE EDUCATION/TRAINING PROGRAM

## 2022-12-08 PROCEDURE — 99232 SBSQ HOSP IP/OBS MODERATE 35: CPT | Performed by: STUDENT IN AN ORGANIZED HEALTH CARE EDUCATION/TRAINING PROGRAM

## 2022-12-08 PROCEDURE — 250N000013 HC RX MED GY IP 250 OP 250 PS 637: Performed by: STUDENT IN AN ORGANIZED HEALTH CARE EDUCATION/TRAINING PROGRAM

## 2022-12-08 PROCEDURE — 85027 COMPLETE CBC AUTOMATED: CPT | Performed by: STUDENT IN AN ORGANIZED HEALTH CARE EDUCATION/TRAINING PROGRAM

## 2022-12-08 PROCEDURE — 80048 BASIC METABOLIC PNL TOTAL CA: CPT | Performed by: STUDENT IN AN ORGANIZED HEALTH CARE EDUCATION/TRAINING PROGRAM

## 2022-12-08 PROCEDURE — 999N000111 HC STATISTIC OT IP EVAL DEFER

## 2022-12-08 PROCEDURE — 120N000001 HC R&B MED SURG/OB

## 2022-12-08 PROCEDURE — 97530 THERAPEUTIC ACTIVITIES: CPT | Mod: GP | Performed by: PHYSICAL THERAPIST

## 2022-12-08 PROCEDURE — 250N000011 HC RX IP 250 OP 636: Performed by: STUDENT IN AN ORGANIZED HEALTH CARE EDUCATION/TRAINING PROGRAM

## 2022-12-08 PROCEDURE — 97161 PT EVAL LOW COMPLEX 20 MIN: CPT | Mod: GP | Performed by: PHYSICAL THERAPIST

## 2022-12-08 RX ORDER — DONEPEZIL HYDROCHLORIDE 5 MG/1
5 TABLET, FILM COATED ORAL AT BEDTIME
Status: DISCONTINUED | OUTPATIENT
Start: 2022-12-08 | End: 2022-12-08

## 2022-12-08 RX ORDER — ASCORBIC ACID 500 MG
1000 TABLET ORAL DAILY
Status: DISCONTINUED | OUTPATIENT
Start: 2022-12-08 | End: 2022-12-21 | Stop reason: HOSPADM

## 2022-12-08 RX ORDER — VITAMIN B COMPLEX
50 TABLET ORAL DAILY
Qty: 30 TABLET | Refills: 0 | DISCHARGE
Start: 2022-12-09

## 2022-12-08 RX ORDER — AMOXICILLIN 250 MG
1 CAPSULE ORAL 2 TIMES DAILY PRN
Qty: 15 TABLET | DISCHARGE
Start: 2022-12-08 | End: 2024-01-01

## 2022-12-08 RX ORDER — HYDROMORPHONE HYDROCHLORIDE 2 MG/1
1-2 TABLET ORAL EVERY 4 HOURS PRN
Qty: 20 TABLET | Refills: 0 | Status: SHIPPED | OUTPATIENT
Start: 2022-12-08 | End: 2024-01-01

## 2022-12-08 RX ORDER — MAGNESIUM OXIDE 400 MG/1
400 TABLET ORAL DAILY
Status: DISCONTINUED | OUTPATIENT
Start: 2022-12-08 | End: 2022-12-21 | Stop reason: HOSPADM

## 2022-12-08 RX ORDER — ENOXAPARIN SODIUM 100 MG/ML
40 INJECTION SUBCUTANEOUS EVERY 24 HOURS
Qty: 5.6 ML | Refills: 0 | DISCHARGE
Start: 2022-12-09 | End: 2022-12-19

## 2022-12-08 RX ADMIN — DOCUSATE SODIUM 100 MG: 100 CAPSULE, LIQUID FILLED ORAL at 20:14

## 2022-12-08 RX ADMIN — ENOXAPARIN SODIUM 40 MG: 40 INJECTION SUBCUTANEOUS at 08:23

## 2022-12-08 RX ADMIN — POLYETHYLENE GLYCOL 3350 17 G: 17 POWDER, FOR SOLUTION ORAL at 08:21

## 2022-12-08 RX ADMIN — SERTRALINE HYDROCHLORIDE 50 MG: 50 TABLET ORAL at 08:19

## 2022-12-08 RX ADMIN — Medication 1 TABLET: at 10:28

## 2022-12-08 RX ADMIN — Medication 50 MCG: at 08:20

## 2022-12-08 RX ADMIN — ACETAMINOPHEN 975 MG: 325 TABLET, FILM COATED ORAL at 14:04

## 2022-12-08 RX ADMIN — SENNOSIDES AND DOCUSATE SODIUM 1 TABLET: 50; 8.6 TABLET ORAL at 20:13

## 2022-12-08 RX ADMIN — ACETAMINOPHEN 975 MG: 325 TABLET, FILM COATED ORAL at 06:19

## 2022-12-08 RX ADMIN — MAGNESIUM OXIDE TAB 400 MG (241.3 MG ELEMENTAL MG) 400 MG: 400 (241.3 MG) TAB at 10:28

## 2022-12-08 RX ADMIN — SENNOSIDES AND DOCUSATE SODIUM 1 TABLET: 50; 8.6 TABLET ORAL at 08:22

## 2022-12-08 RX ADMIN — ACETAMINOPHEN 975 MG: 325 TABLET, FILM COATED ORAL at 21:29

## 2022-12-08 RX ADMIN — CEFAZOLIN 1 G: 1 INJECTION, POWDER, FOR SOLUTION INTRAMUSCULAR; INTRAVENOUS at 01:12

## 2022-12-08 RX ADMIN — DONEPEZIL HYDROCHLORIDE 5 MG: 5 TABLET ORAL at 21:29

## 2022-12-08 RX ADMIN — OXYCODONE HYDROCHLORIDE AND ACETAMINOPHEN 1000 MG: 500 TABLET ORAL at 10:28

## 2022-12-08 RX ADMIN — DOCUSATE SODIUM 100 MG: 100 CAPSULE, LIQUID FILLED ORAL at 08:17

## 2022-12-08 ASSESSMENT — ACTIVITIES OF DAILY LIVING (ADL)
ADLS_ACUITY_SCORE: 43
ADLS_ACUITY_SCORE: 41
ADLS_ACUITY_SCORE: 43
ADLS_ACUITY_SCORE: 41
ADLS_ACUITY_SCORE: 43
ADLS_ACUITY_SCORE: 41

## 2022-12-08 NOTE — PROGRESS NOTES
Care Management Follow Up    Length of Stay (days): 2    Expected Discharge Date: 12/12/2022     Concerns to be Addressed:       Patient plan of care discussed at interdisciplinary rounds: Yes    Anticipated Discharge Disposition:       Anticipated Discharge Services:    Anticipated Discharge DME:      Patient/family educated on Medicare website which has current facility and service quality ratings:    Education Provided on the Discharge Plan:    Patient/Family in Agreement with the Plan:      Referrals Placed by CM/SW:    Private pay costs discussed: Not applicable    Additional Information:    Completed PAS which triggered an OBRA level II. The county will need to complete an assessment prior to transfer to TCU due to pt developmental delay. SW will need to update the senior linkage line once TCU is found.     Sent TCU referrals to TCU's in the area per guardian request.[facility]. Sent additional referrals to TCU in the area.     CHARAN Akbar, HEATHER  Inpatient Care Coordination  Ortho/Spine Unit  [phone]  Cheryl High, [place]

## 2022-12-08 NOTE — PROGRESS NOTES
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    Initial vitals: Temperature 97.6  F, heart rate 68, blood pressure 145/59, oxygen saturation of 97% on room air.  Right shoulder x-ray: Acute impacted complex proximal humerus fracture with surgical neck and tuberosity involved with surrounding tissue swelling and varus angulation.  Pelvis and right hip x-ray: Acute displaced and angulated intertrochanteric proximal femoral fracture, acetabular fracture could not be ruled out.  Right knee x-ray: Subtle dilation and irregularity of trouble plate 2, concerning for nondisplaced fracture, CT of the knee recommended.  X-ray of the left hand shows left thumb DIP dislocation.    Orthopedic surgery was consulted and the patient is now status post surgical fix of the right hip.    Problem List/Assessment and Plan:   Fall with polytrauma:  Unwitnessed fall.  Patient was reportedly up and trying to move around with his CPAP machine still in place.  Had a subsequent fall.  Suspect mechanical given story.  Unfortunately, this is complicated by right hip fracture, right humeral fracture, possible right knee fracture.  Orthopedic surgery was consulted in the emergency department the patient is now status post surgical fix in the right hip and left thumb I&D.  Are planning for conservative cares regarding the right humeral fracture.  -Orthopedic surgery following, appreciate recommendations   -WBAT with a walker x6 weeks   -Lovenox for DVT prophylaxis   -Keep dressing on for 2 weeks, okay for shower   -Osteoporosis  work-up and treatment with PCP within 2 months   -Thumb sutures to be removed in 2 weeks.  Continue thumb splint until suture removal  -PT recommend TCU  -Social work consultation for needed placement    Leukocytosis: Suspect stress demargination in the setting of fall with multiple fractures.  Improving on recheck.  No evidence for infection.    Elevated troponin: Suspect type II demand ischemia in the setting of fall with multiple fractures.  EKG unremarkable.  No chest pain or evidence of ACS.    Parkinson's disease, Lewy body dementia: Continue PTA Aricept.  Does not appear to be on any Parkinson's disease medications.    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    Severe IVONNE: PTA CPAP    ROSALIA: Continue PTA Zoloft    Age-related macular degeneration      DVT Prophylaxis: Enoxaparin (Lovenox) SQ  Code Status: DNR / DNI, confirmed with patient's legal guardian, Irina, at bedside  Discharge Dispo/Date:Patient is medically stable for placement.        Interval History (Subjective):      No acute events overnight.  The patient was quite lethargic when I evaluated him this morning.  He did wake up to noxious stimuli and then answered questions appropriately.  He denied any overt symptoms but did endorse pain of his right shoulder when I asked specifically.  He did endorse that he would like to wake up and eat some breakfast.                  Physical Exam:      Last Vital Signs:  /49 (BP Location: Left arm)   Pulse 56   Temp 98  F (36.7  C) (Temporal)   Resp 18   Wt 79.5 kg (175 lb 3.2 oz)   SpO2 93%       Intake/Output Summary (Last 24 hours) at 12/7/2022 1331  Last data filed at 12/7/2022 1100  Gross per 24 hour   Intake 800 ml   Output 1500 ml   Net -700 ml       General: Alert, awake, no acute distress.  HEENT: NC/AT, eyes anicteric, external occular movements intact, face symmetric.   Cardiac: RRR, S1, S2.  No murmurs appreciated.  Pulmonary: Normal  work of breathing.  Lungs CTAB.  Abdomen: soft, non-tender, non-distended.  Bowel sounds present.  No guarding.  Extremities: no deformities.  Warm, well perfused.  Dressing overlying the right hip is clean dry and intact.  Right shoulder is swollen, bruised.  Distal extremities are warm and well-perfused.  Skin: no rashes or lesions noted.  Warm and dry.  Neuro: No gross deficits noted.  Speech clear.    Psych: Appropriate affect.         Medications:      All current medications were reviewed with changes reflected in problem list.         Data:      All new lab and imaging data was reviewed.   Labs:       Lab Results   Component Value Date     12/07/2022     12/06/2022    Lab Results   Component Value Date    CHLORIDE 102 12/07/2022    CHLORIDE 103 12/06/2022    Lab Results   Component Value Date    BUN 24.8 12/07/2022    BUN 24.0 12/06/2022      Lab Results   Component Value Date    POTASSIUM 4.6 12/07/2022    POTASSIUM 4.1 12/06/2022    Lab Results   Component Value Date    CO2 23 12/07/2022    CO2 25 12/06/2022    Lab Results   Component Value Date    CR 0.99 12/07/2022    CR 0.90 12/07/2022    CR 0.93 12/06/2022        Recent Labs   Lab 12/08/22  0748 12/07/22  0625 12/06/22  0753   WBC 17.6* 15.9* 20.3*   HGB 8.5* 10.2* 12.6*   HCT 25.8* 30.4* 38.3*    98 99    220 278      Imaging:   No imaging     Blaine Gomez DO

## 2022-12-08 NOTE — PROGRESS NOTES
"SPIRITUAL HEALTH SERVICES Progress Note  [facility] Ortho/Spine Unit    Saw pt Scott for an emotional support check-in.  Scott reported that he is feeling \"fine\" and that his surgery yesterday was \"good.\"  He welcomed prayer.  Engaged Scott in light-hearted conversation to facilitated connection and fellowship.  He shared that he likes to bike, swim, and fish.  Near the end of our conversation Scott wanted to use the toilet.  Helped him call his Nurse.      Plan: Will see pt 1-2 times per week, during his admission, for ongoing spiritual/emotional support.    Artemio Rosales M.Div., [facility]  Staff     [facility] routine referrals *[phone]  [facility] available 24/7 for emergent requests/referrals, either by paging the on-call  or by entering an ASAP/STAT consult in Epic (this will also page the on-call ).    "

## 2022-12-08 NOTE — PLAN OF CARE
OT: Orders received. Chart reviewed and discussed with care team.  OT not indicated as pt with planned discharge to TCU. Most appropriate to defer OT eval to next level of care.  Defer discharge recommendations to PT/care team.  Will complete IP OT orders.

## 2022-12-08 NOTE — PROGRESS NOTES
12/08/22 0854   Appointment Info   Signing Clinician's Name / Credentials (PT) HARSHAD Houston   Student Supervision On-site supervision provided;Direct supervision provided;Line of sight supervision provided;Direct Patient Contact Provided;Therapy services provided with the co-signing licensed therapist guiding and directing the services, and providing the skilled judgement and assessment throughout the session  (Lilly Castillo, PT)   Living Environment   Living Environment Comments Patient poor historian. Per chart, patient resides in [facility].   Self-Care   Usual Activity Tolerance poor   Current Activity Tolerance poor   Regular Exercise No   Equipment Currently Used at Home grab bar, toilet;grab bar, tub/shower;shower chair;walker, standard   Fall history within last six months   (unknown, patient poor historian)   Number of times patient has fallen within last six months   (at least 1)   General Information   Onset of Illness/Injury or Date of Surgery 12/06/22   Referring Physician Carlos Horowitz MD   Patient/Family Therapy Goals Statement (PT) improve mobility   Pertinent History of Current Problem (include personal factors and/or comorbidities that impact the POC) Scott Abbasi is a 87 year old male with PMH significant for dementia, 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 s/p ORIF R hip.   Existing Precautions/Restrictions fall;weight bearing   Weight-Bearing Status - RUE nonweight-bearing   Weight-Bearing Status - RLE weight-bearing as tolerated   Cognition   Affect/Mental Status (Cognition) confused;flat/blunted affect   Orientation Status (Cognition) disoriented to;time;verbal cues/prompts needed for orientation;situation   Follows Commands (Cognition) delayed response/completion;repetition of directions required;verbal  cues/prompting required;physical/tactile prompts required;75-90% accuracy   Pain Assessment   Patient Currently in Pain No  (no complaints of pain)   Integumentary/Edema   Integumentary/Edema Comments R UE bruising   Posture    Posture Forward head position;Protracted shoulders   Range of Motion (ROM)   Range of Motion ROM deficits secondary to weakness;ROM deficits secondary to surgical procedure   ROM Comment limited in RLE d/t fracture, RUE limited d/t humerus fx, all other WFL   Strength (Manual Muscle Testing)   Strength (Manual Muscle Testing) Deficits observed during functional mobility   Strength Comments global weakness observed during bed mobility and transfers   Bed Mobility   Comment, (Bed Mobility) MaxAx2 supine>sit   Transfers   Comment, (Transfers) MaxA-Dependent x3 sit<>stand   Gait/Stairs (Locomotion)   Comment, (Gait/Stairs) not appropriate to assess   Balance   Balance Comments requires CGA-ModA for trunk support sitting EOB, unable to balance in standing without Ax3   Sensory Examination   Sensory Perception Comments none reported per patient   Coordination   Coordination Comments no deficits identified   Muscle Tone   Muscle Tone Comments no deficits identified   Clinical Impression   Criteria for Skilled Therapeutic Intervention Yes, treatment indicated   PT Diagnosis (PT) impaired mobility   Influenced by the following impairments cognition, impaired balance, decreased strength, WB status   Functional limitations due to impairments impaired bed mobility, transfers, ambulation   Clinical Presentation (PT Evaluation Complexity) Stable/Uncomplicated   Clinical Presentation Rationale chart review, clinical judgement   Clinical Decision Making (Complexity) low complexity   Planned Therapy Interventions (PT) balance training;bed mobility training;gait training;home exercise program;neuromuscular re-education;patient/family education;stair training;strengthening;transfer training   Anticipated  Equipment Needs at Discharge (PT) commode chair;lift device;wheelchair   Risk & Benefits of therapy have been explained evaluation/treatment results reviewed;care plan/treatment goals reviewed;risks/benefits reviewed;current/potential barriers reviewed;participants voiced agreement with care plan;participants included;patient   PT Total Evaluation Time   PT Eval, Low Complexity Minutes (89436) 8   Plan of Care Review   Plan of Care Reviewed With patient   Physical Therapy Goals   PT Frequency 5x/week   PT Predicted Duration/Target Date for Goal Attainment 12/15/22   PT Goals Bed Mobility;Transfers;Gait   PT: Bed Mobility Moderate assist;Supine to/from sit   PT: Transfers Moderate assist;Sit to/from stand;Assistive device   PT: Gait Moderate assist;10 feet;Assistive device   Interventions   Interventions Quick Adds Therapeutic Activity   Therapeutic Activity   Therapeutic Activities: dynamic activities to improve functional performance Minutes (47129) 30   Symptoms Noted During/After Treatment Fatigue   Treatment Detail/Skilled Intervention Patient supine upon arrival with nursing in attendance. Cued patient to complete supine>sit, required MaxAx2 to sit EOB, needing CGA-ModA for trunk control in sitting. Cued for using LUE to support trunk in sitting, shifting weight forward. Began feeling some fatigue sitting EOB. Patient cued for STSx3 with MaxAx3 and hemiwalker, cues to push up from bed, extend hips, grab hemiwalker. Patient unsteady, requiring too much assist for ambulation at this date, no SaraStedy accessible. Utilized lift to transfer from EOB to recliner. Patient left with alarm on and needs in reach. Nurse notified.   PT Discharge Planning   PT Plan progress bed mobility, transfers, SaraStedy   PT Discharge Recommendation (DC Rec) Transitional Care Facility   PT Rationale for DC Rec Patient presenting below baseline for mobility. Patient requires MaxAx2-3 for bed mobility and sit to stand transfers.  Requires lift to transfer from bed to chair. Patient able to follow cues with reinforcement. Recommend TCU in order to progress mobility and strength. If patient denies TCU, would require a lift at his [facility] and 24/7 assist of 2-3 for all mobility.   PT Brief overview of current status Ax2-3 for bed mobility and transfers, lift   Total Session Time   Timed Code Treatment Minutes 30   Total Session Time (sum of timed and untimed services) 38

## 2022-12-08 NOTE — PLAN OF CARE
Goal Outcome Evaluation:      Plan of Care Reviewed With: patient, caregiver      Patient vital signs are at baseline: Yes  Patient able to ambulate as they were prior to admission or with assist devices provided by therapies during their stay:  No,  Reason:  Lift with assist of two  Patient MUST void prior to discharge:  Yes  Patient able to tolerate oral intake:  Yes  Pain has adequate pain control using Oral analgesics:  Yes  Does patient have an identified :  Yes, wife  Has goal D/C date and time been discussed with patient:  Yes, caregiver      Patient is assist of two with a  lift. Tay removed. Pain managed see MAR. Stable, alert and oriented. Confuse and forgetful. Caregiver at home. Plan is to go home.

## 2022-12-08 NOTE — PROGRESS NOTES
Orthopedic Surgery  Scott Abbasi  12/08/2022     Admit Date:  12/6/2022  POD: 1 Day Post-Op   Procedure(s):  Surgical treatment of right intertrochanteric femur fracture with cephalomedullary device   Left thumb distal interphalangeal joint irrigation and excisional debridement   Right proximal humerus fracture     Dementia at baseline.   Patient resting comfortably in chair     Pain stable.   Tolerating oral intake.      Temp:  [97.4  F (36.3  C)-98.6  F (37  C)] 98  F (36.7  C)  Pulse:  [56-77] 56  Resp:  [12-18] 18  BP: (111-183)/(49-80) 111/49  SpO2:  [93 %-100 %] 93 %    Dressing is clean, dry, and intact right hip.   Bilateral calves are soft, non-tender.  Right lower extremity is NVI.  Sensation intact bilateral lower extremities  Patient able to dorsi and plantar flex bilaterally  +Dp pulse    Right shoulder:  Swelling and ecchymosis right shoulder  TTP  Able to flex/extend wrist and fingers    Left thumb:  Splint intact, no drainage  Brisk cap refill  Pulses present  Sensation intact.     Labs:  Recent Labs   Lab Test 12/08/22  0748 12/07/22  0625 12/06/22  0753   WBC 17.6* 15.9* 20.3*   HGB 8.5* 10.2* 12.6*    220 278       PLAN:    Lovenox for DVT prophylaxis.     Weightbearing as tolerated Right LE with a walker x 6 weeks.       Can WBAT right UE for use of walker, otherwise to be NWB right UE    Avoid pressure through left thumb as able.  Ok to use walker     Physical therapy/occupational therapy.     Keep dressing on hip and thumb for 2 weeks.  OK to shower.  No submerging wound.    Sling to be applied to right UE for pain control, ok to remove for hygiene, walker use and elbow ROM     Discharge:  Case management social work consult for placement    Follow-up with Dr Carlos Horowitz 2 weeks      Disposition   Anticipate d/c to TCU when medically cleared and progressing in PT.    Maria Elena Franco PA-C

## 2022-12-08 NOTE — PLAN OF CARE
Goal Outcome Evaluation:       Patient vital signs are at baseline: No,  Reason:  Requiring O2  Patient able to ambulate as they were prior to admission or with assist devices provided by therapies during their stay:  No,  Reason:  PT assessment tomorrow.  Patient MUST void prior to discharge:  No,  Reason:  Tay in place  Patient able to tolerate oral intake:  Yes  Pain has adequate pain control using Oral analgesics:  Yes  Does patient have an identified :  Yes  Has goal D/C date and time been discussed with patient:  Yes    Patient alert to self. Denies pain. VSS on 2L. Ate regular dinner. No complaints of pain. Tay patent. PT tomorrow.

## 2022-12-08 NOTE — PLAN OF CARE
Care from [id number]    Inpatient Progress Note:  For complete assessment see flow sheet documentation.    BP (!) 170/58 (BP Location: Right arm)   Pulse 68   Temp 98.4  F (36.9  C) (Temporal)   Resp 18   Wt 79.5 kg (175 lb 3.2 oz)   SpO2 98%        Orientation: A&Ox1, Reoriented to place, time, and situation  Neuro: No numbness and tingling. RUE tenderness  Pain status: Pain free at this time  Activity: Bedfast, assist x2  Peripheral edema: Mild edema on LUE and LLE  Resp: CPAP at night  Cardiac: WNL  GI: WNL  : Tay  Skin: Dressing on hip CDI, Sutures on left hand CDI  LDA: PIV, Tay  Infusions: SL  Diet: Regular  Consults: OT/Orthosurgery/PT  Discharge Plan: TBD, guardians prefer home    Pt seen sleeping. VS WNL on CPAP. Moderate weakness noted on Right side of the body. No numbness, tingling or pain reported. Tay in place. PIV SL d/t po well tolerated.     Will continue to monitor and provide cares.     Alicia Peterson RN

## 2022-12-09 ENCOUNTER — APPOINTMENT (OUTPATIENT)
Dept: PHYSICAL THERAPY | Facility: CLINIC | Age: 87
DRG: 480 | End: 2022-12-09
Payer: COMMERCIAL

## 2022-12-09 LAB
ANION GAP SERPL CALCULATED.3IONS-SCNC: 9 MMOL/L (ref 7–15)
BUN SERPL-MCNC: 21.5 MG/DL (ref 8–23)
CALCIUM SERPL-MCNC: 8.5 MG/DL (ref 8.8–10.2)
CHLORIDE SERPL-SCNC: 104 MMOL/L (ref 98–107)
CREAT SERPL-MCNC: 0.8 MG/DL (ref 0.67–1.17)
DEPRECATED HCO3 PLAS-SCNC: 25 MMOL/L (ref 22–29)
ERYTHROCYTE [DISTWIDTH] IN BLOOD BY AUTOMATED COUNT: 12.1 % (ref 10–15)
GFR SERPL CREATININE-BSD FRML MDRD: 86 ML/MIN/1.73M2
GLUCOSE SERPL-MCNC: 117 MG/DL (ref 70–99)
HCT VFR BLD AUTO: 23.3 % (ref 40–53)
HGB BLD-MCNC: 7.8 G/DL (ref 13.3–17.7)
HGB BLD-MCNC: 7.8 G/DL (ref 13.3–17.7)
MCH RBC QN AUTO: 32.8 PG (ref 26.5–33)
MCHC RBC AUTO-ENTMCNC: 33.3 G/DL (ref 31.5–36.5)
MCV RBC AUTO: 99 FL (ref 78–100)
PLATELET # BLD AUTO: 187 10E3/UL (ref 150–450)
PLATELET # BLD AUTO: 187 10E3/UL (ref 150–450)
POTASSIUM SERPL-SCNC: 4.5 MMOL/L (ref 3.4–5.3)
RBC # BLD AUTO: 2.35 10E6/UL (ref 4.4–5.9)
SODIUM SERPL-SCNC: 138 MMOL/L (ref 136–145)
WBC # BLD AUTO: 12.8 10E3/UL (ref 4–11)

## 2022-12-09 PROCEDURE — 85014 HEMATOCRIT: CPT | Performed by: STUDENT IN AN ORGANIZED HEALTH CARE EDUCATION/TRAINING PROGRAM

## 2022-12-09 PROCEDURE — 99232 SBSQ HOSP IP/OBS MODERATE 35: CPT | Performed by: STUDENT IN AN ORGANIZED HEALTH CARE EDUCATION/TRAINING PROGRAM

## 2022-12-09 PROCEDURE — 250N000013 HC RX MED GY IP 250 OP 250 PS 637: Performed by: PHYSICIAN ASSISTANT

## 2022-12-09 PROCEDURE — 99207 PR NO BILLABLE SERVICE THIS VISIT: CPT | Performed by: STUDENT IN AN ORGANIZED HEALTH CARE EDUCATION/TRAINING PROGRAM

## 2022-12-09 PROCEDURE — 36415 COLL VENOUS BLD VENIPUNCTURE: CPT | Performed by: STUDENT IN AN ORGANIZED HEALTH CARE EDUCATION/TRAINING PROGRAM

## 2022-12-09 PROCEDURE — 120N000001 HC R&B MED SURG/OB

## 2022-12-09 PROCEDURE — 85018 HEMOGLOBIN: CPT | Performed by: STUDENT IN AN ORGANIZED HEALTH CARE EDUCATION/TRAINING PROGRAM

## 2022-12-09 PROCEDURE — 80048 BASIC METABOLIC PNL TOTAL CA: CPT | Performed by: STUDENT IN AN ORGANIZED HEALTH CARE EDUCATION/TRAINING PROGRAM

## 2022-12-09 PROCEDURE — 85049 AUTOMATED PLATELET COUNT: CPT | Performed by: STUDENT IN AN ORGANIZED HEALTH CARE EDUCATION/TRAINING PROGRAM

## 2022-12-09 PROCEDURE — 250N000013 HC RX MED GY IP 250 OP 250 PS 637: Performed by: STUDENT IN AN ORGANIZED HEALTH CARE EDUCATION/TRAINING PROGRAM

## 2022-12-09 PROCEDURE — 250N000011 HC RX IP 250 OP 636: Performed by: STUDENT IN AN ORGANIZED HEALTH CARE EDUCATION/TRAINING PROGRAM

## 2022-12-09 PROCEDURE — 97530 THERAPEUTIC ACTIVITIES: CPT | Mod: GP | Performed by: PHYSICAL THERAPIST

## 2022-12-09 RX ADMIN — DOCUSATE SODIUM 100 MG: 100 CAPSULE, LIQUID FILLED ORAL at 20:38

## 2022-12-09 RX ADMIN — ACETAMINOPHEN 975 MG: 325 TABLET, FILM COATED ORAL at 22:12

## 2022-12-09 RX ADMIN — POLYETHYLENE GLYCOL 3350 17 G: 17 POWDER, FOR SOLUTION ORAL at 08:54

## 2022-12-09 RX ADMIN — ENOXAPARIN SODIUM 40 MG: 40 INJECTION SUBCUTANEOUS at 08:54

## 2022-12-09 RX ADMIN — SENNOSIDES AND DOCUSATE SODIUM 1 TABLET: 50; 8.6 TABLET ORAL at 20:38

## 2022-12-09 RX ADMIN — DONEPEZIL HYDROCHLORIDE 5 MG: 5 TABLET ORAL at 22:12

## 2022-12-09 RX ADMIN — ACETAMINOPHEN 975 MG: 325 TABLET, FILM COATED ORAL at 06:32

## 2022-12-09 RX ADMIN — HYDROMORPHONE HYDROCHLORIDE 1 MG: 2 TABLET ORAL at 09:23

## 2022-12-09 RX ADMIN — SENNOSIDES AND DOCUSATE SODIUM 1 TABLET: 50; 8.6 TABLET ORAL at 08:54

## 2022-12-09 RX ADMIN — MAGNESIUM OXIDE TAB 400 MG (241.3 MG ELEMENTAL MG) 400 MG: 400 (241.3 MG) TAB at 08:54

## 2022-12-09 RX ADMIN — Medication 50 MCG: at 08:53

## 2022-12-09 RX ADMIN — DOCUSATE SODIUM 100 MG: 100 CAPSULE, LIQUID FILLED ORAL at 08:54

## 2022-12-09 RX ADMIN — Medication 1 TABLET: at 08:53

## 2022-12-09 RX ADMIN — SERTRALINE HYDROCHLORIDE 50 MG: 50 TABLET ORAL at 08:53

## 2022-12-09 RX ADMIN — OXYCODONE HYDROCHLORIDE AND ACETAMINOPHEN 1000 MG: 500 TABLET ORAL at 08:52

## 2022-12-09 RX ADMIN — HYDROMORPHONE HYDROCHLORIDE 1 MG: 2 TABLET ORAL at 15:12

## 2022-12-09 RX ADMIN — ACETAMINOPHEN 975 MG: 325 TABLET, FILM COATED ORAL at 13:05

## 2022-12-09 ASSESSMENT — ACTIVITIES OF DAILY LIVING (ADL)
ADLS_ACUITY_SCORE: 41

## 2022-12-09 NOTE — PROGRESS NOTES
Care Management Follow Up    Length of Stay (days): 3    Expected Discharge Date: 12/12/2022     Concerns to be Addressed:       Patient plan of care discussed at interdisciplinary rounds: Yes    Anticipated Discharge Disposition:       Anticipated Discharge Services:    Anticipated Discharge DME:      Patient/family educated on Medicare website which has current facility and service quality ratings:    Education Provided on the Discharge Plan:    Patient/Family in Agreement with the Plan:      Referrals Placed by CM/SW:    Private pay costs discussed: Not applicable    Additional Information:    Spoke with pt guardian who was agreeable to this writer speaking with Larissa (P: [phone] F: [phone]). Spoke with Larissa from pt group [facility] who explained that they would potentially be able to accept pt back to their group home. Larissa request this writer fax her therapy notes and RN notes. Lorraine from pt  will review and determine if they could accept pt back Monday. Notes sent.     Pt guardian explained that she would still like TCU referrals sent in case the [facility] cannot provide the cares that pt needs with his current mobility. TCU referrals sent, will continue to follow up while pt  determines if they can accept back at current mobility.     CHARAN Akbar, HEATHER  Inpatient Care Coordination  Ortho/Spine Unit  [phone]  Cheryl High, HEATHER

## 2022-12-09 NOTE — PLAN OF CARE
Patient vital signs are at baseline: Yes  Patient able to ambulate as they were prior to admission or with assist devices provided by therapies during their stay:  No,  Reason:  Weakness, R hip surgery. Up with A2 sera steady.  Patient MUST void prior to discharge:  No,  Reason:  Not voiding, placed schaefer catheter.   Patient able to tolerate oral intake:  Yes  Pain has adequate pain control using Oral analgesics:  Yes  Does patient have an identified :  No,  Reason:  Placement pending  Has goal D/C date and time been discussed with patient:  No, plan is TBD       Alert to self only. Tolerating diet. Pain in right arm. PRN Dilaudid given. Peripheral IV saline locked. Up in chair for most of the day. Possibly back to group home or a TCU.

## 2022-12-09 NOTE — PLAN OF CARE
[id number]    Patient vital signs are at baseline: Yes  Patient able to ambulate as they were prior to admission or with assist devices provided by therapies during their stay:  No,  Reason:  Ax2 w/ lift, Baseline Ax1 w/ walker  Patient MUST void prior to discharge:  No,  Reason:  Pt still retaining, Straight cath x1 this shift, DTV @ 0830  Patient able to tolerate oral intake:  Yes, poor intake, encouraging PO fluid intake.  Pain has adequate pain control using Oral analgesics:  Yes  Does patient have an identified :  Yes  Has goal D/C date and time been discussed with patient:  Yes    Pt is Alert to self only, confused, VSS on RA, uses CPAP at night. Ax2 w/ lift. Denies pain, no non-verbal s/s of pain except w/ movement. Pain managed w/ scheduled tylenol. LS clear, no labored breathing. CMS intact. Edema in R arm & hip. Splint on L thumb. Pt DTV @ 0830, straight cathed x1 for 500 CC's of yellow/brown urine w/ 1 small clot. Resting between cares. PT/OT/SW following. Plan to discharge to TCU per SW, referrals sent out.    /59 (BP Location: Left arm)   Pulse 95   Temp 97.6  F (36.4  C) (Temporal)   Resp 16   Wt 79.5 kg (175 lb 3.2 oz)   SpO2 96%

## 2022-12-09 NOTE — PROGRESS NOTES
Orthopedic Surgery  Scott Abbasi  2022  Admit Date:  2022  POD # 2  S/P Surgical treatment of right intertrochanteric femur fracture with cephalomedullary device   Left thumb distal interphalangeal joint irrigation and excisional debridement   Right proximal humerus fracture      Dementia at baseline.   Patient resting comfortably in bed.    Pain stable.   Tolerating oral intake.      Vital Sign Ranges  Temperature Temp  Av.4  F (36.9  C)  Min: 97.6  F (36.4  C)  Max: 99.1  F (37.3  C)   Blood pressure Systolic (24hrs), Av , Min:106 , Max:127        Diastolic (24hrs), Av, Min:38, Max:59      Pulse Pulse  Av  Min: 64  Max: 102   Respirations Resp  Av.7  Min: 16  Max: 18   Pulse oximetry SpO2  Av.7 %  Min: 92 %  Max: 96 %       Dressing is clean, dry, and intact right hip.   Bilateral calves are soft, non-tender.  Right lower extremity is NVI.  Sensation intact bilateral lower extremities.  Patient able to dorsi and plantar flex bilaterally.  +Dp pulse.     Right shoulder:  Swelling and ecchymosis right shoulder.  TTP right shoulder joint.  Able to flex/extend wrist and fingers.     Left thumb:  Splint intact, no drainage.  Brisk cap refill.  Pulses present.  Sensation intact.     Labs:  Recent Labs   Lab Test 22  0740 22  0748 22  0625   POTASSIUM 4.5 4.2 4.6     Recent Labs   Lab Test 22  0700 22  0748 22  0625   HGB 7.8*  7.8* 8.5* 10.2*     No results for input(s): INR in the last 03813 hours.  Recent Labs   Lab Test 22  0700 22  0748 22  0625     187 198 220       A/P  1. PLAN   Lovenox for DVT prophylaxis.      Weightbearing as tolerated Right LE with a walker x 6 weeks.       Can WBAT right UE for use of walker, otherwise to be NWB right UE     Avoid pressure through left thumb as able.  Ok to use walker      Mobilize with PT/OT.      Keep dressing on hip and thumb for 2 weeks.  OK to shower.  No  submerging wound.     Sling to be applied to right UE for pain control, ok to remove for hygiene, walker use and elbow ROM      Follow-up with Dr Carlos Horowitz 2 weeks       2. Disposition              Anticipate d/c to TCU when medically cleared and progressing in PT.    Elise Galvan PA-C

## 2022-12-09 NOTE — PLAN OF CARE
Goal Outcome Evaluation:         Patient vital signs are at baseline: Yes  Patient able to ambulate as they were prior to admission or with assist devices provided by therapies during their stay:  No,  Reason:  Lift  Patient MUST void prior to discharge:  No,  Reason:  DTV  Patient able to tolerate oral intake:  Yes  Pain has adequate pain control using Oral analgesics:  Yes  Does patient have an identified :  Yes  Has goal D/C date and time been discussed with patient:  Yes        Patient alert to self. A2lift. Needs to be fed unless finger-food is ordered. Sling ordered for RUE. Not yet present. Sat in chair until after dinner. Allowed to have ensure between meals per family. Order obtained. DTV. Bladder scanned for 137 midway through shift.     Patient's family reports at least 4 falls in last 6 months.

## 2022-12-09 NOTE — PROGRESS NOTES
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    Initial vitals: Temperature 97.6  F, heart rate 68, blood pressure 145/59, oxygen saturation of 97% on room air.  Right shoulder x-ray: Acute impacted complex proximal humerus fracture with surgical neck and tuberosity involved with surrounding tissue swelling and varus angulation.  Pelvis and right hip x-ray: Acute displaced and angulated intertrochanteric proximal femoral fracture, acetabular fracture could not be ruled out.  Right knee x-ray: Subtle dilation and irregularity of trouble plate 2, concerning for nondisplaced fracture, CT of the knee recommended.  X-ray of the left hand shows left thumb DIP dislocation.    Orthopedic surgery was consulted and the patient is now status post surgical fix of the right hip.    He is awaiting placement.       Problem List/Assessment and Plan:   Fall with polytrauma:  Unwitnessed fall.  Patient was reportedly up and trying to move around with his CPAP machine still in place.  Had a subsequent fall.  Suspect mechanical given story.  Unfortunately, this is complicated by right hip fracture, right humeral fracture, possible right knee fracture.  Orthopedic surgery was consulted in the emergency department the patient is now status post surgical fix in the right hip and left thumb I&D.  Are planning for conservative cares regarding the right humeral fracture.  -Orthopedic surgery following, appreciate recommendations   -WBAT with a walker x6 weeks   -Lovenox for DVT prophylaxis   -Keep dressing on for 2 weeks,  okay for shower   -Osteoporosis work-up and treatment with PCP within 2 months   -Thumb sutures to be removed in 2 weeks.  Continue thumb splint until suture removal  -PT recommend TCU  -Social work consultation for needed placement    Urinary retention:  Episodic urinary retention post-operatively.  Has required straight cath twice.   -Will place schaefer   -Anticipate that he will need this on discharge    Acute normocytic anemia:  Likely multifactorial in the setting of trauma, fracture, surgery, blood draws, fluid resuscitation.  No obvious evidence for bleeding.  His right shoulder is swollen and therefore he might have some blood collection around the fracture but no overt hematoma noted on exam.  -Repeat CBC in the morning  -Would like to see this plateau prior to discharge    Leukocytosis: Suspect stress demargination in the setting of fall with multiple fractures.  Improving on recheck.  No evidence for infection.    Elevated troponin: Suspect type II demand ischemia in the setting of fall with multiple fractures.  EKG unremarkable.  No chest pain or evidence of ACS.    Parkinson's disease, Lewy body dementia: Continue PTA Aricept.  Does not appear to be on any Parkinson's disease medications.    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    Severe IVONNE: PTA CPAP    ROSALIA: Continue PTA Zoloft    Age-related macular degeneration      DVT Prophylaxis: Enoxaparin (Lovenox) SQ  Code Status: DNR / DNI, confirmed with patient's legal guardian, Irina, at bedside  Discharge Dispo/Date:Patient is medically stable for placement.        Interval History (Subjective):      No acute events overnight.  The patient is awake and eating breakfast.  He reports that he is full.  He does endorse some pain of his right shoulder and his right hip.  Appears comfortable sitting in a recliner.                  Physical Exam:      Last Vital Signs:  BP (!) 106/38 (BP Location: Left arm)    Pulse 102   Temp 99.1  F (37.3  C) (Temporal)   Resp 18   Wt 79.5 kg (175 lb 3.2 oz)   SpO2 92%       Intake/Output Summary (Last 24 hours) at 12/7/2022 1331  Last data filed at 12/7/2022 1100  Gross per 24 hour   Intake 800 ml   Output 1500 ml   Net -700 ml       General: Alert, awake, no acute distress.  HEENT: NC/AT, eyes anicteric, external occular movements intact, face symmetric.   Cardiac: RRR, S1, S2.  No murmurs appreciated.  Pulmonary: Normal work of breathing.  Lungs CTAB.  Abdomen: soft, non-tender, non-distended.  Bowel sounds present.  No guarding.  Extremities: no deformities.  Warm, well perfused.  Dressing overlying the right hip is clean dry and intact.  Right shoulder is swollen, bruised.  Distal extremities are warm and well-perfused.  Skin: no rashes or lesions noted.  Warm and dry.  Neuro: No gross deficits noted.  Speech clear.    Psych: Appropriate affect.         Medications:      All current medications were reviewed with changes reflected in problem list.         Data:      All new lab and imaging data was reviewed.   Labs:       Lab Results   Component Value Date     12/07/2022     12/06/2022    Lab Results   Component Value Date    CHLORIDE 102 12/07/2022    CHLORIDE 103 12/06/2022    Lab Results   Component Value Date    BUN 24.8 12/07/2022    BUN 24.0 12/06/2022      Lab Results   Component Value Date    POTASSIUM 4.6 12/07/2022    POTASSIUM 4.1 12/06/2022    Lab Results   Component Value Date    CO2 23 12/07/2022    CO2 25 12/06/2022    Lab Results   Component Value Date    CR 0.99 12/07/2022    CR 0.90 12/07/2022    CR 0.93 12/06/2022        Recent Labs   Lab 12/09/22  0700 12/08/22  0748 12/07/22  0625   WBC 12.8* 17.6* 15.9*   HGB 7.8*  7.8* 8.5* 10.2*   HCT 23.3* 25.8* 30.4*   MCV 99 100 98     187 198 220      Imaging:   No imaging     Blaine Gomez DO

## 2022-12-09 NOTE — PROGRESS NOTES
Care Management Follow Up    Length of Stay (days): 3    Expected Discharge Date: 12/12/2022     Concerns to be Addressed:       Patient plan of care discussed at interdisciplinary rounds: Yes    Anticipated Discharge Disposition:       Anticipated Discharge Services:    Anticipated Discharge DME:      Patient/family educated on Medicare website which has current facility and service quality ratings:    Education Provided on the Discharge Plan:    Patient/Family in Agreement with the Plan:      Referrals Placed by CM/SW:    Private pay costs discussed: Not applicable    Additional Information:    Spoke with pt IFEOMA Lopes (P: [phone]) who explained that they can submit the OBRA once an accepting facility.     Left VM for Larissa (P: [phone] F: [phone]) at pt [facility] to determine if they could accept pt back.    Followed up on TCU referrals:  [facility]  [facility]-no bed currently, they will call if something opens up  [facility]-left VM    Sent additional referrals to:  Ligia Galvez [facility]    Pt declined from the following facilities:  [facility]    Addend    Spoke with Larissa (P: [phone] F: [phone]) at pt  who explained that she is going to stop by to assess pt around 1400 today to determine if they can meet pt needs with his current mobility.     CHARAN Akbar, SW  Inpatient Care Coordination  Ortho/Spine Unit  [phone]  Cheryl High, [facility]

## 2022-12-10 ENCOUNTER — APPOINTMENT (OUTPATIENT)
Dept: ULTRASOUND IMAGING | Facility: CLINIC | Age: 87
DRG: 480 | End: 2022-12-10
Attending: STUDENT IN AN ORGANIZED HEALTH CARE EDUCATION/TRAINING PROGRAM
Payer: COMMERCIAL

## 2022-12-10 LAB
ERYTHROCYTE [DISTWIDTH] IN BLOOD BY AUTOMATED COUNT: 12.1 % (ref 10–15)
HCT VFR BLD AUTO: 21.4 % (ref 40–53)
HGB BLD-MCNC: 7 G/DL (ref 13.3–17.7)
HGB BLD-MCNC: 8 G/DL (ref 13.3–17.7)
LACTATE SERPL-SCNC: 1 MMOL/L (ref 0.7–2)
MCH RBC QN AUTO: 33 PG (ref 26.5–33)
MCHC RBC AUTO-ENTMCNC: 32.7 G/DL (ref 31.5–36.5)
MCV RBC AUTO: 101 FL (ref 78–100)
PLATELET # BLD AUTO: 205 10E3/UL (ref 150–450)
RBC # BLD AUTO: 2.12 10E6/UL (ref 4.4–5.9)
WBC # BLD AUTO: 13.7 10E3/UL (ref 4–11)

## 2022-12-10 PROCEDURE — 250N000013 HC RX MED GY IP 250 OP 250 PS 637: Performed by: STUDENT IN AN ORGANIZED HEALTH CARE EDUCATION/TRAINING PROGRAM

## 2022-12-10 PROCEDURE — 85018 HEMOGLOBIN: CPT | Performed by: STUDENT IN AN ORGANIZED HEALTH CARE EDUCATION/TRAINING PROGRAM

## 2022-12-10 PROCEDURE — 83605 ASSAY OF LACTIC ACID: CPT | Performed by: STUDENT IN AN ORGANIZED HEALTH CARE EDUCATION/TRAINING PROGRAM

## 2022-12-10 PROCEDURE — 250N000013 HC RX MED GY IP 250 OP 250 PS 637: Performed by: PHYSICIAN ASSISTANT

## 2022-12-10 PROCEDURE — 76882 US LMTD JT/FCL EVL NVASC XTR: CPT | Mod: RT

## 2022-12-10 PROCEDURE — 85027 COMPLETE CBC AUTOMATED: CPT | Performed by: STUDENT IN AN ORGANIZED HEALTH CARE EDUCATION/TRAINING PROGRAM

## 2022-12-10 PROCEDURE — 99233 SBSQ HOSP IP/OBS HIGH 50: CPT | Performed by: STUDENT IN AN ORGANIZED HEALTH CARE EDUCATION/TRAINING PROGRAM

## 2022-12-10 PROCEDURE — 120N000001 HC R&B MED SURG/OB

## 2022-12-10 PROCEDURE — 250N000011 HC RX IP 250 OP 636: Performed by: STUDENT IN AN ORGANIZED HEALTH CARE EDUCATION/TRAINING PROGRAM

## 2022-12-10 PROCEDURE — 36415 COLL VENOUS BLD VENIPUNCTURE: CPT | Performed by: STUDENT IN AN ORGANIZED HEALTH CARE EDUCATION/TRAINING PROGRAM

## 2022-12-10 RX ORDER — POLYETHYLENE GLYCOL 3350 17 G/17G
17 POWDER, FOR SOLUTION ORAL 2 TIMES DAILY
Status: DISCONTINUED | OUTPATIENT
Start: 2022-12-10 | End: 2022-12-17

## 2022-12-10 RX ADMIN — ENOXAPARIN SODIUM 40 MG: 40 INJECTION SUBCUTANEOUS at 10:41

## 2022-12-10 RX ADMIN — SENNOSIDES AND DOCUSATE SODIUM 1 TABLET: 50; 8.6 TABLET ORAL at 22:06

## 2022-12-10 RX ADMIN — MAGNESIUM OXIDE TAB 400 MG (241.3 MG ELEMENTAL MG) 400 MG: 400 (241.3 MG) TAB at 08:06

## 2022-12-10 RX ADMIN — HYDROMORPHONE HYDROCHLORIDE 1 MG: 2 TABLET ORAL at 22:20

## 2022-12-10 RX ADMIN — POLYETHYLENE GLYCOL 3350 17 G: 17 POWDER, FOR SOLUTION ORAL at 22:06

## 2022-12-10 RX ADMIN — HYDROMORPHONE HYDROCHLORIDE 1 MG: 2 TABLET ORAL at 03:58

## 2022-12-10 RX ADMIN — HYDROMORPHONE HYDROCHLORIDE 1 MG: 2 TABLET ORAL at 17:31

## 2022-12-10 RX ADMIN — ACETAMINOPHEN 975 MG: 325 TABLET, FILM COATED ORAL at 06:38

## 2022-12-10 RX ADMIN — OXYCODONE HYDROCHLORIDE AND ACETAMINOPHEN 1000 MG: 500 TABLET ORAL at 08:06

## 2022-12-10 RX ADMIN — DONEPEZIL HYDROCHLORIDE 5 MG: 5 TABLET ORAL at 22:06

## 2022-12-10 RX ADMIN — HYDROMORPHONE HYDROCHLORIDE 1 MG: 2 TABLET ORAL at 08:06

## 2022-12-10 RX ADMIN — POLYETHYLENE GLYCOL 3350 17 G: 17 POWDER, FOR SOLUTION ORAL at 08:07

## 2022-12-10 RX ADMIN — DOCUSATE SODIUM 100 MG: 100 CAPSULE, LIQUID FILLED ORAL at 08:06

## 2022-12-10 RX ADMIN — DOCUSATE SODIUM 100 MG: 100 CAPSULE, LIQUID FILLED ORAL at 22:06

## 2022-12-10 RX ADMIN — SERTRALINE HYDROCHLORIDE 50 MG: 50 TABLET ORAL at 08:06

## 2022-12-10 RX ADMIN — HYDROMORPHONE HYDROCHLORIDE 1 MG: 2 TABLET ORAL at 13:36

## 2022-12-10 RX ADMIN — Medication 1 TABLET: at 08:06

## 2022-12-10 RX ADMIN — ACETAMINOPHEN 975 MG: 325 TABLET, FILM COATED ORAL at 15:11

## 2022-12-10 RX ADMIN — Medication 50 MCG: at 08:06

## 2022-12-10 RX ADMIN — SENNOSIDES AND DOCUSATE SODIUM 1 TABLET: 50; 8.6 TABLET ORAL at 08:06

## 2022-12-10 RX ADMIN — Medication 1 MG: at 23:47

## 2022-12-10 RX ADMIN — ACETAMINOPHEN 975 MG: 325 TABLET, FILM COATED ORAL at 22:06

## 2022-12-10 ASSESSMENT — ACTIVITIES OF DAILY LIVING (ADL)
ADLS_ACUITY_SCORE: 41
ADLS_ACUITY_SCORE: 41
ADLS_ACUITY_SCORE: 39
ADLS_ACUITY_SCORE: 41
ADLS_ACUITY_SCORE: 39
ADLS_ACUITY_SCORE: 39
ADLS_ACUITY_SCORE: 41
ADLS_ACUITY_SCORE: 39
ADLS_ACUITY_SCORE: 41
ADLS_ACUITY_SCORE: 41

## 2022-12-10 NOTE — PROGRESS NOTES
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    Initial vitals: Temperature 97.6  F, heart rate 68, blood pressure 145/59, oxygen saturation of 97% on room air.  Right shoulder x-ray: Acute impacted complex proximal humerus fracture with surgical neck and tuberosity involved with surrounding tissue swelling and varus angulation.  Pelvis and right hip x-ray: Acute displaced and angulated intertrochanteric proximal femoral fracture, acetabular fracture could not be ruled out.  Right knee x-ray: Subtle dilation and irregularity of trouble plate 2, concerning for nondisplaced fracture, CT of the knee recommended.  X-ray of the left hand shows left thumb DIP dislocation.    Orthopedic surgery was consulted and the patient is now status post surgical fix of the right hip.    Postoperative course has been complicated by progressive anemia.    He is awaiting placement.       Problem List/Assessment and Plan:   Fall with polytrauma:  Unwitnessed fall.  Patient was reportedly up and trying to move around with his CPAP machine still in place.  Had a subsequent fall.  Suspect mechanical given story.  Unfortunately, this is complicated by right hip fracture, right humeral fracture, possible right knee fracture.  Orthopedic surgery was consulted in the emergency department the patient is now status post surgical fix in the right hip and left thumb I&D.  Are planning for conservative cares regarding the right humeral fracture.  -Orthopedic surgery following, appreciate recommendations   -WBAT with a walker x6  weeks   -Lovenox for DVT prophylaxis   -Keep dressing on for 2 weeks, okay for shower   -Osteoporosis work-up and treatment with PCP within 2 months   -Thumb sutures to be removed in 2 weeks.  Continue thumb splint until suture removal  -PT recommend TCU  -Social work consultation for needed placement    Acute normocytic anemia:  Likely multifactorial in the setting of trauma, fracture, surgery, blood draws, fluid resuscitation.  No obvious evidence for bleeding.  His right shoulder is swollen and therefore he might have some blood collection around the fracture but no overt hematoma noted on exam.  Hgb has dropped from >12 to 7 post-operatively.  No obvious signs of external bleeding.  Imaging completed of the RUE and RLE shows small hematoma at the site of the surgical fixation of right hip.   -Repeat CBC in the morning  -Would like to see this plateau prior to discharge  -Transfuse for Hgb <7.    Urinary retention:  Episodic urinary retention post-operatively.  Has required straight cath twice.   -Will place schaefer   -Anticipate that he will need this on discharge    Leukocytosis: Suspect stress demargination in the setting of fall with multiple fractures.  Improving on recheck.  No evidence for infection.    Elevated troponin: Suspect type II demand ischemia in the setting of fall with multiple fractures.  EKG unremarkable.  No chest pain or evidence of ACS.    Parkinson's disease, Lewy body dementia: Continue PTA Aricept.  Does not appear to be on any Parkinson's disease medications.    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    Severe IVONNE: PTA CPAP    ROSALIA: Continue PTA Zoloft    Age-related macular degeneration      DVT Prophylaxis: Enoxaparin (Lovenox) SQ  Code Status: DNR / DNI, confirmed with patient's legal guardian, Irina, at bedside  Discharge Dispo/Date:Patient is medically stable for placement.        Interval History (Subjective):      No acute  events overnight.  The patient endorses some mild epigastric pain.  He is currently eating breakfast without difficulty.  Does endorse some ongoing pain of his right shoulder and right hip which is stable.                  Physical Exam:      Last Vital Signs:  /41 (BP Location: Left arm)   Pulse 59   Temp 97.1  F (36.2  C) (Temporal)   Resp 16   Wt 79.5 kg (175 lb 3.2 oz)   SpO2 93%       Intake/Output Summary (Last 24 hours) at 12/7/2022 1331  Last data filed at 12/7/2022 1100  Gross per 24 hour   Intake 800 ml   Output 1500 ml   Net -700 ml       General: Alert, awake, no acute distress.  HEENT: NC/AT, eyes anicteric, external occular movements intact, face symmetric.   Cardiac: RRR, S1, S2.  No murmurs appreciated.  Pulmonary: Normal work of breathing.  Lungs CTAB.  Abdomen: soft, non-tender, non-distended.  Bowel sounds present.  No guarding.  Extremities: no deformities.  Warm, well perfused.  Dressing overlying the right hip is clean dry and intact.  There is some surrounding bruising and swelling but I do not palpate any obvious fluid collection in the right shoulder or right hip.  Right shoulder is swollen, bruised.  Distal extremities are warm and well-perfused.  Skin: no rashes or lesions noted.  Warm and dry.  Neuro: No gross deficits noted.  Speech clear.    Psych: Appropriate affect.         Medications:      All current medications were reviewed with changes reflected in problem list.         Data:      All new lab and imaging data was reviewed.   Labs:       Lab Results   Component Value Date     12/07/2022     12/06/2022    Lab Results   Component Value Date    CHLORIDE 102 12/07/2022    CHLORIDE 103 12/06/2022    Lab Results   Component Value Date    BUN 24.8 12/07/2022    BUN 24.0 12/06/2022      Lab Results   Component Value Date    POTASSIUM 4.6 12/07/2022    POTASSIUM 4.1 12/06/2022    Lab Results   Component Value Date    CO2 23 12/07/2022    CO2 25 12/06/2022    Lab  Results   Component Value Date    CR 0.99 12/07/2022    CR 0.90 12/07/2022    CR 0.93 12/06/2022        Recent Labs   Lab 12/10/22  0733 12/09/22  0700 12/08/22  0748   WBC 13.7* 12.8* 17.6*   HGB 7.0* 7.8*  7.8* 8.5*   HCT 21.4* 23.3* 25.8*   * 99 100    187  187 198      Imaging:   No imaging     Blaine Gomez DO

## 2022-12-10 NOTE — PLAN OF CARE
Goal Outcome Evaluation:             Patient vital signs are at baseline: Yes  Patient able to ambulate as they were prior to admission or with assist devices provided by therapies during their stay:  No,  Reason: pt is A-2 with sera steady.   Patient MUST void prior to discharge:  No,  Reason: pt has Tay cathter and voiding clear lowell color urine.  Patient able to tolerate oral intake:  Yes  Pain has adequate pain control using Oral analgesics:  Yes  Does patient have an identified :  No,  Reason: pt is from group home.  Has goal D/C date and time been discussed with patient:  Yes , Possible discharge  to TCU on 12/12/22.     Pt is alert only self. R/ shoulder bruised and swollen, R/ hip has dressing clean, dry and intact. No derange noted. PRN Dilaudid 0.5 tab given. Ice applied. Pt slept, has CPAP on.  Pt is on  RA. IV SL.Pt is A-2 with sera steady. On regular diet and thin liquids.

## 2022-12-10 NOTE — PROGRESS NOTES
Patient vital signs are at baseline: Yes  Patient able to ambulate as they were prior to admission or with assist devices provided by therapies during their stay:  No,  Reason:  A2 sera steady. BLE weakness, RUE weakness and pain.   Patient MUST void prior to discharge:  No,  Reason:  DTV, schaefer removed at 09:47, straight cath 325 mL at 17:15.   Patient able to tolerate oral intake:  Yes  Pain has adequate pain control using Oral analgesics:  Yes, PRN oral Dilaudid.   Does patient have an identified :  No,  Reason: pending wither group home or TCU placement  Has goal D/C date and time been discussed with patient:  No,  Reason:  Pending.       Alert to self. Up in chair for all meals. Unable to void. Pt. expressed pain when straight catheterized, small red clot visible in tubing. US of LLE, small hematoma present, micro foam dressing applied over Aquacel for pressure. Peripheral IV saline locked. Right arm in universal sling.

## 2022-12-10 NOTE — PROGRESS NOTES
Orthopedic Surgery  Scott Charlie Abbasi  Admit Date:  12/6/2022  POD # 3  S/P Surgical treatment of right intertrochanteric femur fracture with cephalomedullary device   Left thumb distal interphalangeal joint irrigation and excisional debridement   Right proximal humerus fracture      Dementia at baseline.   Patient sitting up in chair eating breakfast.    Pain appears stable. Bites at thumb dressing occasionally    Tolerating oral intake.      Vital Sign Ranges  /41 (BP Location: Left arm)   Pulse 59   Temp 97.1  F (36.2  C) (Temporal)   Resp 16   Wt 79.5 kg (175 lb 3.2 oz)   SpO2 93%     Dressing is clean, dry, and intact right hip.   Bilateral calves are soft, non-tender.  Right lower extremity is NVI.  Sensation intact bilateral lower extremities.  Pt wiggles toes to pressure, motor intact.   +Dp pulse.     Right shoulder:  Swelling and ecchymosis right shoulder.  TTP right shoulder joint.  Able to flex/extend wrist and fingers.     Left thumb:  Splint intact, no drainage.  Brisk cap refill.  Pulses present.  Sensation intact.     Labs:  Recent Labs     Hemoglobin   Date Value Ref Range Status   12/10/2022 8.0 (L) 13.3 - 17.7 g/dL Final   ]    A/P  1. PLAN   Lovenox for DVT prophylaxis.      Weightbearing as tolerated Right LE with a walker x 6 weeks.       Can WBAT right UE for use of walker, otherwise to be NWB right UE     Avoid pressure through left thumb as able.  Ok to use walker      Mobilize with PT/OT.      Keep dressing on hip and thumb for 2 weeks.  OK to shower.  No submerging wound.     Sling to be applied to right UE for pain control, ok to remove for hygiene, walker use and elbow ROM      Follow-up with Dr Carlos Horowitz 2 weeks       2. Disposition              Anticipate d/c to TCU when medically cleared and progressing in PT. Ortho stable.     Pippa GUERRERO  [facility]  [phone]

## 2022-12-11 ENCOUNTER — APPOINTMENT (OUTPATIENT)
Dept: PHYSICAL THERAPY | Facility: CLINIC | Age: 87
DRG: 480 | End: 2022-12-11
Payer: COMMERCIAL

## 2022-12-11 LAB — HGB BLD-MCNC: 7.9 G/DL (ref 13.3–17.7)

## 2022-12-11 PROCEDURE — 250N000011 HC RX IP 250 OP 636: Performed by: STUDENT IN AN ORGANIZED HEALTH CARE EDUCATION/TRAINING PROGRAM

## 2022-12-11 PROCEDURE — 36415 COLL VENOUS BLD VENIPUNCTURE: CPT | Performed by: STUDENT IN AN ORGANIZED HEALTH CARE EDUCATION/TRAINING PROGRAM

## 2022-12-11 PROCEDURE — 120N000001 HC R&B MED SURG/OB

## 2022-12-11 PROCEDURE — 85018 HEMOGLOBIN: CPT | Performed by: STUDENT IN AN ORGANIZED HEALTH CARE EDUCATION/TRAINING PROGRAM

## 2022-12-11 PROCEDURE — 250N000013 HC RX MED GY IP 250 OP 250 PS 637: Performed by: STUDENT IN AN ORGANIZED HEALTH CARE EDUCATION/TRAINING PROGRAM

## 2022-12-11 PROCEDURE — 99233 SBSQ HOSP IP/OBS HIGH 50: CPT | Performed by: STUDENT IN AN ORGANIZED HEALTH CARE EDUCATION/TRAINING PROGRAM

## 2022-12-11 PROCEDURE — 250N000013 HC RX MED GY IP 250 OP 250 PS 637: Performed by: PHYSICIAN ASSISTANT

## 2022-12-11 PROCEDURE — 97530 THERAPEUTIC ACTIVITIES: CPT | Mod: GP | Performed by: PHYSICAL THERAPIST

## 2022-12-11 RX ADMIN — SENNOSIDES AND DOCUSATE SODIUM 1 TABLET: 50; 8.6 TABLET ORAL at 07:56

## 2022-12-11 RX ADMIN — ACETAMINOPHEN 975 MG: 325 TABLET, FILM COATED ORAL at 21:47

## 2022-12-11 RX ADMIN — HYDROMORPHONE HYDROCHLORIDE 1 MG: 2 TABLET ORAL at 03:18

## 2022-12-11 RX ADMIN — Medication 50 MCG: at 07:56

## 2022-12-11 RX ADMIN — DOCUSATE SODIUM 100 MG: 100 CAPSULE, LIQUID FILLED ORAL at 07:56

## 2022-12-11 RX ADMIN — OXYCODONE HYDROCHLORIDE AND ACETAMINOPHEN 1000 MG: 500 TABLET ORAL at 07:56

## 2022-12-11 RX ADMIN — DONEPEZIL HYDROCHLORIDE 5 MG: 5 TABLET ORAL at 21:47

## 2022-12-11 RX ADMIN — POLYETHYLENE GLYCOL 3350 17 G: 17 POWDER, FOR SOLUTION ORAL at 07:56

## 2022-12-11 RX ADMIN — HYDROMORPHONE HYDROCHLORIDE 1 MG: 2 TABLET ORAL at 17:12

## 2022-12-11 RX ADMIN — ENOXAPARIN SODIUM 40 MG: 40 INJECTION SUBCUTANEOUS at 08:01

## 2022-12-11 RX ADMIN — ACETAMINOPHEN 975 MG: 325 TABLET, FILM COATED ORAL at 07:04

## 2022-12-11 RX ADMIN — MAGNESIUM OXIDE TAB 400 MG (241.3 MG ELEMENTAL MG) 400 MG: 400 (241.3 MG) TAB at 07:56

## 2022-12-11 RX ADMIN — ACETAMINOPHEN 975 MG: 325 TABLET, FILM COATED ORAL at 15:31

## 2022-12-11 RX ADMIN — Medication 1 TABLET: at 07:56

## 2022-12-11 RX ADMIN — HYDROMORPHONE HYDROCHLORIDE 1 MG: 2 TABLET ORAL at 08:01

## 2022-12-11 RX ADMIN — SERTRALINE HYDROCHLORIDE 50 MG: 50 TABLET ORAL at 07:56

## 2022-12-11 ASSESSMENT — ACTIVITIES OF DAILY LIVING (ADL)
ADLS_ACUITY_SCORE: 39

## 2022-12-11 NOTE — PLAN OF CARE
Pt is alert to self only, was not able to sleep, Melatonin given, pt was able to sleep until about 2:30, bladder scanned 378 ml, str cathed for 500 ml, at 7 am bladder scan 133 ml. Provider notified. Dilaudid 1 mg given for pain to R arm. Aquacel dressing intact c/d/I. Plan to discharge to TCU, needs placement.

## 2022-12-11 NOTE — PROGRESS NOTES
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    Initial vitals: Temperature 97.6  F, heart rate 68, blood pressure 145/59, oxygen saturation of 97% on room air.  Right shoulder x-ray: Acute impacted complex proximal humerus fracture with surgical neck and tuberosity involved with surrounding tissue swelling and varus angulation.  Pelvis and right hip x-ray: Acute displaced and angulated intertrochanteric proximal femoral fracture, acetabular fracture could not be ruled out.  Right knee x-ray: Subtle dilation and irregularity of trouble plate 2, concerning for nondisplaced fracture, CT of the knee recommended.  X-ray of the left hand shows left thumb DIP dislocation.    Orthopedic surgery was consulted and the patient is now status post surgical fix of the right hip.    Postoperative course has been complicated by progressive anemia.    He is awaiting placement.       Problem List/Assessment and Plan:   Fall with polytrauma:  Unwitnessed fall.  Patient was reportedly up and trying to move around with his CPAP machine still in place.  Had a subsequent fall.  Suspect mechanical given story.  Unfortunately, this is complicated by right hip fracture, right humeral fracture, possible right knee fracture.  Orthopedic surgery was consulted in the emergency department the patient is now status post surgical fix in the right hip and left thumb I&D.  Are planning for conservative cares regarding the right humeral fracture.  -Orthopedic surgery following, appreciate recommendations   -WBAT with a walker x6  weeks   -Lovenox for DVT prophylaxis   -Keep dressing on for 2 weeks, okay for shower   -Osteoporosis work-up and treatment with PCP within 2 months   -Thumb sutures to be removed in 2 weeks.  Continue thumb splint until suture removal  -PT recommend TCU  -Social work consultation for needed placement    Acute normocytic anemia:  Likely multifactorial in the setting of trauma, fracture, surgery, blood draws, fluid resuscitation.  No obvious evidence for bleeding.  His right shoulder is swollen and therefore he might have some blood collection around the fracture but no overt hematoma noted on exam.  Hgb has dropped from >12 to 7 post-operatively.  Has now stabilized.  No obvious signs of external bleeding.  Imaging completed of the RUE and RLE shows small hematoma at the site of the surgical fixation of right hip.    -Transfuse for Hgb <7.    Urinary retention:  Episodic urinary retention post-operatively.  Has required straight cath twice.   -Will place schaefer   -Will discharge with this given failure of TOV    Leukocytosis: Suspect stress demargination in the setting of fall with multiple fractures.  Improving on recheck.  No evidence for infection.    Elevated troponin: Suspect type II demand ischemia in the setting of fall with multiple fractures.  EKG unremarkable.  No chest pain or evidence of ACS.    Parkinson's disease, Lewy body dementia: Continue PTA Aricept.  Does not appear to be on any Parkinson's disease medications.    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    Severe IVONNE: PTA CPAP    ROSALIA: Continue PTA Zoloft    Age-related macular degeneration      DVT Prophylaxis: Enoxaparin (Lovenox) SQ  Code Status: DNR / DNI, confirmed with patient's legal guardian, Irina, at bedside  Discharge Dispo/Date:Patient is medically stable for placement.        Interval History (Subjective):      NAEO. Patient denies any complaints. Is resting comfortably in bed.  Ongoing urinary rentention overnight following schaefer removal with TOV.                   Physical Exam:      Last Vital Signs:  /49 (BP Location: Right arm)   Pulse 72   Temp 96.9  F (36.1  C) (Temporal)   Resp 18   Wt 79.5 kg (175 lb 3.2 oz)   SpO2 91%       Intake/Output Summary (Last 24 hours) at 12/7/2022 1331  Last data filed at 12/7/2022 1100  Gross per 24 hour   Intake 800 ml   Output 1500 ml   Net -700 ml       General: Alert, awake, no acute distress.  HEENT: NC/AT, eyes anicteric, external occular movements intact, face symmetric.   Cardiac: RRR, S1, S2.  No murmurs appreciated.  Pulmonary: Normal work of breathing.  Lungs CTAB.  Abdomen: soft, non-tender, non-distended.  Bowel sounds present.  No guarding.  Extremities: no deformities.  Warm, well perfused.  Dressing overlying the right hip is clean dry and intact.  There is some surrounding bruising and swelling.  Right shoulder is swollen, bruised.  Distal extremities are warm and well-perfused.  Skin: no rashes or lesions noted.  Warm and dry.  Neuro: No gross deficits noted.  Speech clear.    Psych: Appropriate affect.         Medications:      All current medications were reviewed with changes reflected in problem list.         Data:      All new lab and imaging data was reviewed.   Labs:       Lab Results   Component Value Date     12/07/2022     12/06/2022    Lab Results   Component Value Date    CHLORIDE 102 12/07/2022    CHLORIDE 103 12/06/2022    Lab Results   Component Value Date    BUN 24.8 12/07/2022    BUN 24.0 12/06/2022      Lab Results   Component Value Date    POTASSIUM 4.6 12/07/2022    POTASSIUM 4.1 12/06/2022    Lab Results   Component Value Date    CO2 23 12/07/2022    CO2 25 12/06/2022    Lab Results   Component Value Date    CR 0.99 12/07/2022    CR 0.90 12/07/2022    CR 0.93 12/06/2022        Recent Labs   Lab 12/11/22  0703 12/10/22  1347 12/10/22  0733 12/09/22  0700 12/08/22  0748   WBC  --   --  13.7*  12.8* 17.6*   HGB 7.9* 8.0* 7.0* 7.8*  7.8* 8.5*   HCT  --   --  21.4* 23.3* 25.8*   MCV  --   --  101* 99 100   PLT  --   --  205 187  187 198      Imaging:   No imaging     Blaine Gomez, DO

## 2022-12-11 NOTE — PROGRESS NOTES
Patient vital signs are at baseline: Yes  Patient able to ambulate as they were prior to admission or with assist devices provided by therapies during their stay:  No,  Reason:  Sera steady with A2.  Patient MUST void prior to discharge:  No,  Reason:  Not voiding spontaneously. Schaefer catheter in place.   Patient able to tolerate oral intake:  Yes  Pain has adequate pain control using Oral analgesics:  Yes, PRN dilaudid.   Does patient have an identified :  No,  Reason:  Pending placement  Has goal D/C date and time been discussed with patient:  No,  Reason:  Pending placement     Alert to self, pleasant. Unable to void on own, schaefer in place. Continent of bowel with 2 BMs today, brown and no signs of blood.  Tolerating regular diet, good appetite. Takes medications 1-2 pills at a time. Peripheral IV saline locked. Right hip incision sites covered with Aquacel and Microfoam, CDI. Left thumb dressing with brace is CDI. CMS+.  Assist of 2 with sera steady, needs frequent reminders of hand and foot placement. Pending placement, likely TCU before back to .

## 2022-12-11 NOTE — PROGRESS NOTES
Orthopedic Surgery  Scott Abbasi  Admit Date:  12/6/2022  POD # 4  S/P Surgical treatment of right intertrochanteric femur fracture with cephalomedullary device   Left thumb distal interphalangeal joint irrigation and excisional debridement   Right proximal humerus fracture      Dementia at baseline.   Standing at the edge of the bed with staff.   Pain appears stable.  Tolerating oral intake.      Vital Sign Ranges  /49 (BP Location: Right arm)   Pulse 72   Temp 96.9  F (36.1  C) (Temporal)   Resp 18   Wt 79.5 kg (175 lb 3.2 oz)   SpO2 91%     Dressing is clean, dry, and intact right hip.   Bilateral calves are soft, non-tender.  Right lower extremity is NVI.  +Dp pulse.     Right shoulder:  Swelling and ecchymosis right shoulder.  Able to flex/extend wrist and fingers.     Left thumb:  Splint intact, no drainage.  Brisk cap refill.    Sensation intact.     Labs:  Recent Labs     Hemoglobin   Date Value Ref Range Status   12/11/2022 7.9 (L) 13.3 - 17.7 g/dL Final   ]    A/P  1. PLAN   Lovenox for DVT prophylaxis.      Weightbearing as tolerated Right LE with a walker x 6 weeks.       Can WBAT right UE for use of walker, otherwise to be NWB right UE     Avoid pressure through left thumb as able.  Ok to use walker      Mobilize with PT/OT.      Keep dressing on hip and thumb for 2 weeks.  OK to shower.  No submerging wound.     Sling to be applied to right UE for pain control, ok to remove for hygiene, walker use and elbow ROM      Follow-up with Dr Carlos Horowitz 2 weeks       2. Disposition              Anticipate d/c to TCU when medically cleared and progressing in PT. Ortho stable.     Pippa GUERRERO  [facility]  [phone]

## 2022-12-11 NOTE — PLAN OF CARE
Patient vital signs are at baseline: Yes  Patient able to ambulate as they were prior to admission or with assist devices provided by therapies during their stay:  No,  Reason:  Pt requiring Ax2 with virginia steady  Patient MUST void prior to discharge:  No,  Reason:  Pt schaefer removed this am, requiring straight cath intermittently as pt has been unable to void on own.  Patient able to tolerate oral intake:  Yes  Pain has adequate pain control using Oral analgesics:  Yes  Does patient have an identified :  No,  Reason:  TCU vs return to [facility].  Has goal D/C date and time been discussed with patient:  No,  Reason:  Pending discharge disposition.    Alert to self.  Unable to void on own, Bladder scan at 2115 was 221cc.  Po dilaudid for pain.  S. Lock.  Discharge pending.

## 2022-12-12 ENCOUNTER — APPOINTMENT (OUTPATIENT)
Dept: ULTRASOUND IMAGING | Facility: CLINIC | Age: 87
DRG: 480 | End: 2022-12-12
Attending: PHYSICIAN ASSISTANT
Payer: COMMERCIAL

## 2022-12-12 ENCOUNTER — APPOINTMENT (OUTPATIENT)
Dept: PHYSICAL THERAPY | Facility: CLINIC | Age: 87
DRG: 480 | End: 2022-12-12
Payer: COMMERCIAL

## 2022-12-12 LAB
ERYTHROCYTE [DISTWIDTH] IN BLOOD BY AUTOMATED COUNT: 13.2 % (ref 10–15)
HCT VFR BLD AUTO: 23.3 % (ref 40–53)
HGB BLD-MCNC: 7.6 G/DL (ref 13.3–17.7)
MCH RBC QN AUTO: 33.3 PG (ref 26.5–33)
MCHC RBC AUTO-ENTMCNC: 32.6 G/DL (ref 31.5–36.5)
MCV RBC AUTO: 102 FL (ref 78–100)
PLATELET # BLD AUTO: 261 10E3/UL (ref 150–450)
PLATELET # BLD AUTO: 296 10E3/UL (ref 150–450)
RADIOLOGIST FLAGS: ABNORMAL
RBC # BLD AUTO: 2.28 10E6/UL (ref 4.4–5.9)
WBC # BLD AUTO: 17.1 10E3/UL (ref 4–11)

## 2022-12-12 PROCEDURE — 36415 COLL VENOUS BLD VENIPUNCTURE: CPT | Performed by: INTERNAL MEDICINE

## 2022-12-12 PROCEDURE — 250N000013 HC RX MED GY IP 250 OP 250 PS 637: Performed by: STUDENT IN AN ORGANIZED HEALTH CARE EDUCATION/TRAINING PROGRAM

## 2022-12-12 PROCEDURE — 120N000001 HC R&B MED SURG/OB

## 2022-12-12 PROCEDURE — 97530 THERAPEUTIC ACTIVITIES: CPT | Mod: GP | Performed by: PHYSICAL THERAPIST

## 2022-12-12 PROCEDURE — 93971 EXTREMITY STUDY: CPT | Mod: RT

## 2022-12-12 PROCEDURE — 250N000013 HC RX MED GY IP 250 OP 250 PS 637: Performed by: PHYSICIAN ASSISTANT

## 2022-12-12 PROCEDURE — 36415 COLL VENOUS BLD VENIPUNCTURE: CPT | Performed by: STUDENT IN AN ORGANIZED HEALTH CARE EDUCATION/TRAINING PROGRAM

## 2022-12-12 PROCEDURE — 85049 AUTOMATED PLATELET COUNT: CPT | Performed by: STUDENT IN AN ORGANIZED HEALTH CARE EDUCATION/TRAINING PROGRAM

## 2022-12-12 PROCEDURE — 99232 SBSQ HOSP IP/OBS MODERATE 35: CPT | Performed by: HOSPITALIST

## 2022-12-12 PROCEDURE — 85049 AUTOMATED PLATELET COUNT: CPT | Performed by: INTERNAL MEDICINE

## 2022-12-12 PROCEDURE — 250N000011 HC RX IP 250 OP 636: Performed by: INTERNAL MEDICINE

## 2022-12-12 PROCEDURE — 85027 COMPLETE CBC AUTOMATED: CPT | Performed by: STUDENT IN AN ORGANIZED HEALTH CARE EDUCATION/TRAINING PROGRAM

## 2022-12-12 RX ORDER — HEPARIN SODIUM 10000 [USP'U]/100ML
0-5000 INJECTION, SOLUTION INTRAVENOUS CONTINUOUS
Status: DISCONTINUED | OUTPATIENT
Start: 2022-12-12 | End: 2022-12-13

## 2022-12-12 RX ADMIN — Medication 50 MCG: at 07:54

## 2022-12-12 RX ADMIN — HYDROMORPHONE HYDROCHLORIDE 2 MG: 2 TABLET ORAL at 18:25

## 2022-12-12 RX ADMIN — DOCUSATE SODIUM 100 MG: 100 CAPSULE, LIQUID FILLED ORAL at 07:54

## 2022-12-12 RX ADMIN — DONEPEZIL HYDROCHLORIDE 5 MG: 5 TABLET ORAL at 21:12

## 2022-12-12 RX ADMIN — OXYCODONE HYDROCHLORIDE AND ACETAMINOPHEN 1000 MG: 500 TABLET ORAL at 07:55

## 2022-12-12 RX ADMIN — HYDROMORPHONE HYDROCHLORIDE 2 MG: 2 TABLET ORAL at 07:54

## 2022-12-12 RX ADMIN — Medication 1 TABLET: at 07:54

## 2022-12-12 RX ADMIN — MAGNESIUM OXIDE TAB 400 MG (241.3 MG ELEMENTAL MG) 400 MG: 400 (241.3 MG) TAB at 07:54

## 2022-12-12 RX ADMIN — HYDROMORPHONE HYDROCHLORIDE 2 MG: 2 TABLET ORAL at 23:56

## 2022-12-12 RX ADMIN — SENNOSIDES AND DOCUSATE SODIUM 1 TABLET: 50; 8.6 TABLET ORAL at 07:55

## 2022-12-12 RX ADMIN — ACETAMINOPHEN 975 MG: 325 TABLET, FILM COATED ORAL at 21:11

## 2022-12-12 RX ADMIN — HYDROMORPHONE HYDROCHLORIDE 2 MG: 2 TABLET ORAL at 03:16

## 2022-12-12 RX ADMIN — POLYETHYLENE GLYCOL 3350 17 G: 17 POWDER, FOR SOLUTION ORAL at 21:37

## 2022-12-12 RX ADMIN — HEPARIN SODIUM 950 UNITS/HR: 10000 INJECTION, SOLUTION INTRAVENOUS at 21:18

## 2022-12-12 RX ADMIN — SERTRALINE HYDROCHLORIDE 50 MG: 50 TABLET ORAL at 07:54

## 2022-12-12 RX ADMIN — ACETAMINOPHEN 975 MG: 325 TABLET, FILM COATED ORAL at 14:21

## 2022-12-12 RX ADMIN — DOCUSATE SODIUM 100 MG: 100 CAPSULE, LIQUID FILLED ORAL at 21:12

## 2022-12-12 RX ADMIN — HYDROMORPHONE HYDROCHLORIDE 2 MG: 2 TABLET ORAL at 14:21

## 2022-12-12 RX ADMIN — ACETAMINOPHEN 975 MG: 325 TABLET, FILM COATED ORAL at 05:37

## 2022-12-12 ASSESSMENT — ACTIVITIES OF DAILY LIVING (ADL)
ADLS_ACUITY_SCORE: 39

## 2022-12-12 NOTE — PROGRESS NOTES
Patient vital signs are at baseline: Yes  Patient able to ambulate as they were prior to admission or with assist devices provided by therapies during their stay:  No,  Reason:  [place] steady with A2.  Patient MUST void prior to discharge:  No,  Reason:  Not voiding spontaneously. Schaefer catheter in place. Plan to discharge with schaefer.   Patient able to tolerate oral intake:  Yes  Pain has adequate pain control using Oral analgesics:  Yes, PRN dilaudid.   Does patient have an identified :  No,  Reason:  Pending placement  Has goal D/C date and time been discussed with patient:  No,  Reason:  Pending placement     Alert to self, pleasant. Unable to void on own, schaefer in place. Tolerating regular diet, good appetite. Takes medications 1-2 pills at a time. Peripheral IV saline locked. Right hip incision sites covered with Aquacel and Microfoam, CDI. Left thumb dressing with brace is CDI. CMS+.  TOM increased bruising, MD notified. Assist of 2 with virginia gordon. Pending placement, likely TCU before back to .

## 2022-12-12 NOTE — PROGRESS NOTES
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     Initial vitals: Temperature 97.6  F, heart rate 68, blood pressure 145/59, oxygen saturation of 97% on room air.  Right shoulder x-ray: Acute impacted complex proximal humerus fracture with surgical neck and tuberosity involved with surrounding tissue swelling and varus angulation.  Pelvis and right hip x-ray: Acute displaced and angulated intertrochanteric proximal femoral fracture, acetabular fracture could not be ruled out.  Right knee x-ray: Subtle dilation and irregularity of trouble plate 2, concerning for nondisplaced fracture, CT of the knee recommended.  X-ray of the left hand shows left thumb DIP dislocation.     Orthopedic surgery was consulted and the patient is now status post surgical fix of the right hip. Postoperative course has been complicated by progressive anemia, now stable.     He is awaiting placement.      R femur fracture  L open thumb DIP joint fracture/dislocation  R proximal humerus fracture  -Unwitnessed fall.  Patient was reportedly up and trying to move around with his CPAP machine still in place.  Had a subsequent fall.  Suspect mechanical given story.  Unfortunately, this is complicated by right hip fracture, right humeral fracture, possible right knee fracture.   -s/p R intertrochanteric femur fracture w/cephalomedullary device on 12/7/2022/. Are planning for conservative cares regarding the right humeral fracture.  -Orthopedic surgery following, appreciate recommendations               -WBAT with a walker x6 weeks               -Lovenox for DVT prophylaxis                -Keep dressing on for 2 weeks, okay for shower               -Osteoporosis work-up and treatment with PCP within 2 months               -Thumb sutures to be removed in 2 weeks.  Continue thumb splint until suture removal  -PT recommend TCU, awaiting placement     Acute normocytic anemia:  -Likely multifactorial in the setting of trauma, fracture, surgery, blood draws, fluid resuscitation.  No obvious evidence for bleeding.  His right shoulder is swollen and therefore he might have some blood collection around the fracture but no overt hematoma noted on exam.    -Hgb dropped from >12 to 7 post-operatively.  Has now stabilized.  No obvious signs of external bleeding, has some bruising noted to R arm    -rpt Hgb tomorrow and transfuse <7     Urinary retention:  Episodic urinary retention post-operatively.  Has required straight cath twice.   -Will discharge with schaefer given failure of TOV     Leukocytosis  -Suspect stress demargination in the setting of fall with multiple fractures.  Improving on recheck.  No evidence for infection.     Elevated troponin  -Suspect type II demand ischemia in the setting of fall with multiple fractures.  EKG unremarkable.  No chest pain or evidence of ACS.     Parkinson's disease, Lewy body dementia  -Continue PTA Aricept.  Does not appear to be on any Parkinson's disease medications.     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     Severe IVONNE: PTA CPAP     ROSALIA: Continue PTA Zoloft     Age-related macular degeneration     Diet: Advance Diet as Tolerated: Regular Diet Adult  Snacks/Supplements Adult: Ensure Clear; Between Meals    DVT Prophylaxis: Enoxaparin (Lovenox) SQ  Schaefer Catheter: PRESENT, indication: Retention, Retention, Retention  Central Lines: None  Cardiac Monitoring: None  Code Status: No CPR- Do NOT Intubate      Disposition Plan   Medically stable, awaiting placment     The patient's care was discussed with the  Patient.    Artemio Lemus DO  Hospitalist Service  [facility]  Securely message with the [employer] Web Console (learn more here)  Text page via [employer]abi Paging/Directory   ______________________________________________________________________    Interval History   No overnight events, vitals have been stable. Some increased bruising to R arm but hasn't been in a sling it appears    Data reviewed today: I reviewed all medications, new labs and imaging results over the last 24 hours.     Physical Exam   Vital Signs: Temp: 98.2  F (36.8  C) Temp src: Temporal BP: 126/41 Pulse: 79   Resp: 16 SpO2: 95 % O2 Device: None (Room air)    Weight: 175 lbs 3.2 oz  Constitutional: awake, alert  Eyes: pupils equal, round and reactive to light and conjunctiva normal  ENT: normocepalic, without obvious abnormality, atramatic  Respiratory: no increased work of breathing, good air exchange and clear to auscultation  Cardiovascular: regular rate and rhythm and no murmur noted  GI: normal bowel sounds, soft, non-distended and non-tender  Skin: bruising to R arm noted, no rashes  Neurologic: awake, not following commands, moving all extremeties but decreased ROM R arm and hip    Data   Recent Labs   Lab 12/12/22  0615 12/11/22  0703 12/10/22  1347 12/10/22  0733 12/09/22  0740 12/09/22  0700 12/08/22  0748 12/07/22  1145 12/07/22  0625   WBC  --   --   --  13.7*  --  12.8* 17.6*  --  15.9*   HGB  --  7.9* 8.0* 7.0*  --  7.8*  7.8* 8.5*  --  10.2*   MCV  --   --   --  101*  --  99 100  --  98     --   --  205  --  187  187 198  --  220   NA  --   --   --   --  138  --  135*  --  134*   POTASSIUM  --   --   --   --  4.5  --  4.2  --  4.6   CHLORIDE  --   --   --   --  104  --  103  --  102   CO2  --   --   --   --  25  --  24  --  23   BUN  --   --   --   --  21.5  --  23.0  --  24.8*   CR  --   --   --   --  0.80  --  0.86 0.99 0.90   ANIONGAP  --   --   --   --  9  --  8  --  9   FLORENTINO  --   --   --   --  8.5*   --  8.4*  --  8.6*   GLC  --   --   --   --  117*  --  162*  --  145*     No results found for this or any previous visit (from the past 24 hour(s)).  Medications       acetaminophen  975 mg Oral Q8H     calcium carbonate-vitamin D  1 tablet Oral Daily     docusate sodium  100 mg Oral BID     donepezil  5 mg Oral At Bedtime     enoxaparin ANTICOAGULANT  40 mg Subcutaneous Q24H     magnesium oxide  400 mg Oral Daily     polyethylene glycol  17 g Oral BID     senna-docusate  1 tablet Oral BID     sertraline  50 mg Oral Daily     sodium chloride (PF)  3 mL Intracatheter Q8H     vitamin C  1,000 mg Oral Daily     cholecalciferol  50 mcg Oral Daily

## 2022-12-12 NOTE — PROGRESS NOTES
Orthopedic Progress Note    Spoke with patient's RN regarding worsening ecchymosis and swelling of the right upper extremity in the setting of humerus fracture. Hemoglobin has been stable. Unable to elicit reliable exam due to patient having dementia. NVI. Will obtain right upper extremity Doppler US. Will hold Lovenox until tomorrow morning. Recheck hemoglobin in the morning.    Patti Hector PA-C  [facility]

## 2022-12-12 NOTE — PLAN OF CARE
Goal Outcome Evaluation:  Patient vital signs are at baseline: Yes  Patient able to ambulate as they were prior to admission or with assist devices provided by therapies during their stay:  No,  lift  Patient MUST void prior to discharge:  Yes   Patient able to tolerate oral intake:  Yes  Pain has adequate pain control using Oral analgesics:  Yes  Does patient have an identified :  Yes  Has goal D/C date and time been discussed with patient:  Yes      Plan is placement to TCU.  Uses CPAP at night.

## 2022-12-12 NOTE — PLAN OF CARE
Pt is alert to self, virginia steady for transfers, Tay output 225 ml. Fluids encouraged. Pt c/o R arm pain, prn Dilaudid was given and was effective, pt used CPAP on RA during night. Aquacell dressing and foam tape intact to R hip, no drainage. Plan to discharge to TCU.

## 2022-12-13 LAB
APTT PPP: 26 SECONDS (ref 22–38)
APTT PPP: 34 SECONDS (ref 22–38)
APTT PPP: 41 SECONDS (ref 22–38)

## 2022-12-13 PROCEDURE — 85730 THROMBOPLASTIN TIME PARTIAL: CPT | Performed by: HOSPITALIST

## 2022-12-13 PROCEDURE — 120N000001 HC R&B MED SURG/OB

## 2022-12-13 PROCEDURE — 250N000013 HC RX MED GY IP 250 OP 250 PS 637: Performed by: STUDENT IN AN ORGANIZED HEALTH CARE EDUCATION/TRAINING PROGRAM

## 2022-12-13 PROCEDURE — 36415 COLL VENOUS BLD VENIPUNCTURE: CPT | Performed by: HOSPITALIST

## 2022-12-13 PROCEDURE — 99232 SBSQ HOSP IP/OBS MODERATE 35: CPT | Performed by: HOSPITALIST

## 2022-12-13 PROCEDURE — 250N000013 HC RX MED GY IP 250 OP 250 PS 637: Performed by: HOSPITALIST

## 2022-12-13 PROCEDURE — 250N000013 HC RX MED GY IP 250 OP 250 PS 637: Performed by: PHYSICIAN ASSISTANT

## 2022-12-13 RX ADMIN — SENNOSIDES AND DOCUSATE SODIUM 1 TABLET: 50; 8.6 TABLET ORAL at 20:06

## 2022-12-13 RX ADMIN — ACETAMINOPHEN 975 MG: 325 TABLET, FILM COATED ORAL at 21:38

## 2022-12-13 RX ADMIN — Medication 1 TABLET: at 09:34

## 2022-12-13 RX ADMIN — MAGNESIUM OXIDE TAB 400 MG (241.3 MG ELEMENTAL MG) 400 MG: 400 (241.3 MG) TAB at 09:34

## 2022-12-13 RX ADMIN — SENNOSIDES AND DOCUSATE SODIUM 1 TABLET: 50; 8.6 TABLET ORAL at 09:34

## 2022-12-13 RX ADMIN — APIXABAN 5 MG: 5 TABLET, FILM COATED ORAL at 18:31

## 2022-12-13 RX ADMIN — Medication 50 MCG: at 09:33

## 2022-12-13 RX ADMIN — SERTRALINE HYDROCHLORIDE 50 MG: 50 TABLET ORAL at 09:34

## 2022-12-13 RX ADMIN — HYDROMORPHONE HYDROCHLORIDE 2 MG: 2 TABLET ORAL at 04:59

## 2022-12-13 RX ADMIN — HYDROMORPHONE HYDROCHLORIDE 2 MG: 2 TABLET ORAL at 12:58

## 2022-12-13 RX ADMIN — DOCUSATE SODIUM 100 MG: 100 CAPSULE, LIQUID FILLED ORAL at 20:07

## 2022-12-13 RX ADMIN — ACETAMINOPHEN 975 MG: 325 TABLET, FILM COATED ORAL at 09:33

## 2022-12-13 RX ADMIN — OXYCODONE HYDROCHLORIDE AND ACETAMINOPHEN 1000 MG: 500 TABLET ORAL at 09:34

## 2022-12-13 RX ADMIN — ACETAMINOPHEN 975 MG: 325 TABLET, FILM COATED ORAL at 13:47

## 2022-12-13 RX ADMIN — DOCUSATE SODIUM 100 MG: 100 CAPSULE, LIQUID FILLED ORAL at 09:33

## 2022-12-13 RX ADMIN — DONEPEZIL HYDROCHLORIDE 5 MG: 5 TABLET ORAL at 21:38

## 2022-12-13 ASSESSMENT — ACTIVITIES OF DAILY LIVING (ADL)
ADLS_ACUITY_SCORE: 39

## 2022-12-13 NOTE — PROGRESS NOTES
Cross Cover    Called for positive RUE DVT in the setting of recent fracture with hematoma and ABL anemia    hgb 12.6 on admission -->7-8 range and now stable.     Ortho ordered the RUE US, My plan would be to heparinize with lwo intensity dosing without boluses to begin with to assess tolerance and titrate up from there    Spoke with charge, asked that ortho be notified of the result, and I am happy to complaints of-manage if they would like to give me a call-left my cell number      Addendum:  Discussed with both Dr Arredondo and OLEG GUERRERO    They agree with above  -low intensity without boluses ordered as above

## 2022-12-13 NOTE — PROGRESS NOTES
Orthopedic Surgery  Scott Abbasi  Admit Date:  12/6/2022  POD # 6  S/P Surgical treatment of right intertrochanteric femur fracture with cephalomedullary device and left thumb distal interphalangeal joint irrigation and excisional debridement (DOS: 12/7/22)    Right proximal humerus fracture      Dementia at baseline.   Patient is resting comfortably in chair.   When asked about different areas of pain, agrees to having pain in right shoulder, right hip, and left thumb.   Tolerating oral intake.    Doppler US of the RUE obtained yesterday evening was positive for occlusive DVT. Patient started on Heparin, which is managed by hospitalist.    Vital Sign Ranges  BP (!) 141/50 (BP Location: Left arm)   Pulse 68   Temp 97.8  F (36.6  C) (Temporal)   Resp 20   Wt 79.5 kg (175 lb 3.2 oz)   SpO2 92%     Alert, non-toxic appearing. Dementia at baseline and nonparticipatory with exam.    Right hip:  Dressing is clean, dry, and intact.  Mild swelling of thigh.  No surrounding erythema.  Bilateral calves are soft, non-tender.  Right lower extremity is NVI.  +DP pulse.  Unable to assess sensation due to dementia.     Right shoulder:  Extensive swelling and ecchymosis right shoulder with extension into the upper arm, elbow, and forearm.  Able to flex/extend wrist and fingers.     Left thumb:  Splint intact, no drainage.  Ranging all other fingers without difficulty.  Radial pulse 2+.     Labs:  Recent Labs     Hemoglobin   Date Value Ref Range Status   12/12/2022 7.6 (L) 13.3 - 17.7 g/dL Final     A/P  1. PLAN   Heparin started yesterday in light of RUE DVT.       WBAT RLE with walker x 6 weeks.        Can WBAT right UE for use of walker, otherwise to be NWB right UE     Avoid pressure through left thumb as able. Ok to use walker      Mobilize with PT/OT.     Keep dressing on hip and thumb for 2 weeks. OK to shower with dressings covered. No submerging wound.     Sling to be applied to right UE for pain control, ok  to remove for hygiene, walker use and elbow ROM     Follow-up with Dr Carlos Horowitz 2 weeks.      2. Disposition              Anticipate d/c to TCU when medically cleared and progressing in PT. Ortho stable.     Patti Hector PA-C

## 2022-12-13 NOTE — PROVIDER NOTIFICATION
Critical lab result: US of RUE positive for DVT. Paged Mariah and Admitting pager (due to time).     Will wait for new orders.

## 2022-12-13 NOTE — PROGRESS NOTES
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     Initial vitals: Temperature 97.6  F, heart rate 68, blood pressure 145/59, oxygen saturation of 97% on room air.  Right shoulder x-ray: Acute impacted complex proximal humerus fracture with surgical neck and tuberosity involved with surrounding tissue swelling and varus angulation.  Pelvis and right hip x-ray: Acute displaced and angulated intertrochanteric proximal femoral fracture, acetabular fracture could not be ruled out.  Right knee x-ray: Subtle dilation and irregularity of trouble plate 2, concerning for nondisplaced fracture, CT of the knee recommended.  X-ray of the left hand shows left thumb DIP dislocation.     Orthopedic surgery was consulted and the patient is now status post surgical fix of the right hip. Postoperative course has been complicated by progressive anemia and now DVT RUE.      RUE DVT  -noted on US, one of two brachial veins  -placed on heparin drip yesterday, no plans for surgery and Hgb appears relatively stable but some drift and borderline around mid 7's  -will change to oral eliquis but start at 5mg BID instead of usual 10mg initial dosing and watch Hgb closely    R femur fracture  L open thumb DIP joint fracture/dislocation  R proximal humerus fracture  -Unwitnessed fall.  Patient was reportedly up and trying to move around with his CPAP machine still in place.  Had a subsequent fall.  Suspect mechanical given story.  Unfortunately, this is complicated by right hip fracture, right humeral fracture, possible right knee fracture.   -s/p R intertrochanteric femur fracture  w/cephalomedullary device on 12/7/2022/. Are planning for conservative cares regarding the right humeral fracture.  -Orthopedic surgery following, appreciate recommendations               -WBAT with a walker x6 weeks               -Keep dressing on for 2 weeks, okay for shower               -Osteoporosis work-up and treatment with PCP within 2 months               -Thumb sutures to be removed in 2 weeks.  Continue thumb splint until suture removal  -PT recommend TCU, awaiting placement     Acute normocytic anemia:  -Likely multifactorial in the setting of trauma, fracture, surgery, blood draws, fluid resuscitation.  No obvious evidence for bleeding.  His right shoulder is swollen and therefore he might have some blood collection around the fracture but no overt hematoma noted on exam.    -Hgb dropped from >12 to 7 post-operatively, dilution and bruising contributing    -now w/DVT and changing to full anticocoagulation with close monitoring  -serial labs and transfuse < 7     Urinary retention:  -Episodic urinary retention post-operatively.  Has required straight cath twice.   -Will discharge with schaefer given failure of TOV     Leukocytosis  -Suspect stress demargination in the setting of fall with multiple fractures.  Improving on recheck.  No evidence for infection.     Elevated troponin  -Suspect type II demand ischemia in the setting of fall with multiple fractures.  EKG unremarkable.  No chest pain or evidence of ACS.     Parkinson's disease, Lewy body dementia  -Continue PTA Aricept.  Does not appear to be on any Parkinson's disease medications.     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     Severe IVONNE: PTA CPAP     ROSALIA: Continue PTA Zoloft     Age-related macular degeneration     Diet: Advance Diet as Tolerated: Regular Diet Adult  Snacks/Supplements Adult: Ensure Clear; Between Meals    DVT Prophylaxis: DOAC  Schaefer Catheter: PRESENT, indication:  Retention, Retention, Retention  Central Lines: None  Cardiac Monitoring: None  Code Status: No CPR- Do NOT Intubate      Disposition Plan    await stable Hgb, possible discharge next 48 hours    The patient's care was discussed with the Patient's Family.    Artemio Lemus DO  Hospitalist Service  [facility]  Securely message with the [employer] Web Console (learn more here)  Text page via [place] Paging/Directory   ______________________________________________________________________    Interval History   Noted to have DVT in his R arm, Hgb has been relatively stable. Was started on heparin drip overnight. No new complaints but has significant cognitive impairment. Ortho with no plans for surgery    Data reviewed today: I reviewed all medications, new labs and imaging results over the last 24 hours.    Physical Exam   Vital Signs: Temp: 97.8  F (36.6  C) Temp src: Temporal BP: (!) 141/50 Pulse: 68   Resp: 20 SpO2: 92 % O2 Device: None (Room air)    Weight: 175 lbs 3.2 oz  Constitutional: awake, alert  Eyes: pupils equal, round and reactive to light and conjunctiva normal  ENT: normocepalic, without obvious abnormality, atramatic  Respiratory: no increased work of breathing, good air exchange and clear to auscultation  Cardiovascular: regular rate and rhythm and no murmur noted  GI: normal bowel sounds, soft, non-distended and non-tender  Skin: bruising to R arm noted, no rashes  Neurologic: awake, oriented x1-2, decreased ROM R arm in sling    Data   Recent Labs   Lab 12/12/22  1932 12/12/22  0615 12/11/22  0703 12/10/22  1347 12/10/22  0733 12/09/22  0740 12/09/22  0700 12/08/22  0748 12/07/22  1145 12/07/22  0625   WBC 17.1*  --   --   --  13.7*  --  12.8* 17.6*  --  15.9*   HGB 7.6*  --  7.9* 8.0* 7.0*  --  7.8*  7.8* 8.5*  --  10.2*   *  --   --   --  101*  --  99 100  --  98    261  --   --  205  --  187  187 198  --  220   NA  --   --   --   --   --  138  --  135*  --  134*    POTASSIUM  --   --   --   --   --  4.5  --  4.2  --  4.6   CHLORIDE  --   --   --   --   --  104  --  103  --  102   CO2  --   --   --   --   --  25  --  24  --  23   BUN  --   --   --   --   --  21.5  --  23.0  --  24.8*   CR  --   --   --   --   --  0.80  --  0.86 0.99 0.90   ANIONGAP  --   --   --   --   --  9  --  8  --  9   FLORENTINO  --   --   --   --   --  8.5*  --  8.4*  --  8.6*   GLC  --   --   --   --   --  117*  --  162*  --  145*     Recent Results (from the past 24 hour(s))   US Upper Extremity Venous Duplex Right   Result Value    Radiologist flags DVT (AA)    Narrative    EXAM: US UPPER EXTREMITY VENOUS DUPLEX RIGHT  LOCATION: [facility]  DATE/TIME: 12/12/2022 5:03 PM    INDICATION: Right Upper extremity venous Doppler US, humerus fx, progressive increase in swelling and bruising.  COMPARISON: None.  TECHNIQUE: Venous Duplex ultrasound of the right upper extremity with (when possible) and without compression, augmentation, and duplex. Color flow and spectral Doppler with waveform analysis performed.    FINDINGS: Ultrasound includes evaluation of the internal jugular vein, innominate vein, subclavian vein, axillary vein, and brachial vein. The superficial cephalic and basilic veins were also evaluated where seen.     RIGHT: Examination is positive for occlusive deep venous thrombosis involving one of two paired brachial veins. This does not extend into the axillary/subclavian veins. Basilic and cephalic veins are patent.      Impression    IMPRESSION:    Occlusive deep venous thrombosis involving one of two paired brachial veins in the right upper extremity.    [Critical Result: DVT]    Finding was identified on 12/12/2022 5:49 PM.     Annemarie Arredondo RN was contacted by me on 12/12/2022 5:51 PM and verbalized understanding of the critical result.      Medications       acetaminophen  975 mg Oral Q8H     apixaban ANTICOAGULANT  5 mg Oral BID     calcium carbonate-vitamin D  1 tablet  Oral Daily     docusate sodium  100 mg Oral BID     donepezil  5 mg Oral At Bedtime     magnesium oxide  400 mg Oral Daily     polyethylene glycol  17 g Oral BID     senna-docusate  1 tablet Oral BID     sertraline  50 mg Oral Daily     sodium chloride (PF)  3 mL Intracatheter Q8H     vitamin C  1,000 mg Oral Daily     cholecalciferol  50 mcg Oral Daily

## 2022-12-13 NOTE — PLAN OF CARE
Goal Outcome Evaluation:      Plan of Care Reviewed With: patient    Overall Patient Progress: improvingOverall Patient Progress: improving         VSS. Afebrile. AO x2. Pt. Is up with assist of 2 with use of virginia steady and KYLEIGH. Pt. Pulled schaefer during this shift. New schaefer in place. IVF saline locked. Pt. Was on a heparin gtt d/t DVT in R arm. Heparin gtt discontinued. Labs tonight at 1715. Pt. Removed aquacell dressing during this shift. New dressing in place. Pt. Sling was removed by family member towards the end of shift. Pt. Uses nonverbal indicators to identify pain, oral dilaudid used to manage pain. Swelling and bruising to the R hip. Bruising to the R hip. Pt. Takes pills one at a time or crushed in applesauce. CPAP in room to be used at night. Discharge plan to be determined.

## 2022-12-13 NOTE — PROGRESS NOTES
"BRIEF NUTRITION ASSESSMENT    REASON FOR ASSESSMENT:  Scott Abbasi is a 87 year old male seen by Registered Dietitian for [facility]    NUTRITION HISTORY:  - Information obtained from chart   - Food allergies: NKFA    CURRENT DIET AND INTAKE:  Diet: Regular Diet + Ensure Clear at 2pm     Upon review of the flowsheets pt is consuming % of meals ordered. 2 meals ordered documented at 50% consumed. Ordering 3 meals per day as diet order allows     ANTHROPOMETRICS:  Height: none documented --> 5' 7\" per care everywhere (ordered)   Weight: 175 lbs 3.2 oz  BMI - 27.4 kg/m^2  Weight Status: Overweight BMI 25-29.9  Weight History: no weight loss noted  Wt Readings from Last 10 Encounters:   12/06/22 79.5 kg (175 lb 3.2 oz)     Per care everywhere:    74.8 kg (165 lb) 05/04/2022 9:07 AM CDT      76.7 kg (169 lb) 10/17/2022 10:23 AM CDT       LABS:  Labs noted    MALNUTRITION:  Visual Nutrition Focused Physical Assessment (NFPA) not completed     % Weight Loss: None noted  % Intake:  Decreased intake does not meet criteria for malnutrition     NUTRITION INTERVENTION:  Nutrition Diagnosis:  No nutrition diagnosis at this time.    Implementation:  Nutrition Education:  Per Provider order if indicated    FOLLOW UP/MONITORING:   Will re-evaluate in 7 - 10 days, or sooner, if re-consulted.    Rebecca Reid RD, LD  Clinical Dietitian     3rd floor/ICU: [phone]  All other floors: [phone]  Weekend/holiday: [phone]  Office: [phone]        "

## 2022-12-13 NOTE — PLAN OF CARE
Goal Outcome Evaluation:  .Patient vital signs are at baseline: Yes  Patient able to ambulate as they were prior to admission or with assist devices provided by therapies during their stay:  No,  Reason: not yet  to baseline  Patient MUST void prior to discharge:  Yes has catheter baseline  Patient able to tolerate oral intake:  Yes  Pain has adequate pain control using Oral analgesics:  Yes  Does patient have an identified :  Yes  Has goal D/C date and time been discussed with patient:  No,  Reason:  to be determined     Pt is alert to self with some confusion.occassionally calling out, PRN dilaudid given as needed for pain.MRI done to R arm positive for DVT started on heparin  , dose readjusted as needed after lab.

## 2022-12-13 NOTE — PROGRESS NOTES
"Care Management Follow Up    Length of Stay (days): 7    Expected Discharge Date: 12/15/2022     Concerns to be Addressed:       Patient plan of care discussed at interdisciplinary rounds: Yes    Anticipated Discharge Disposition:       Anticipated Discharge Services:    Anticipated Discharge DME:      Patient/family educated on Medicare website which has current facility and service quality ratings:    Education Provided on the Discharge Plan:    Patient/Family in Agreement with the Plan:      Referrals Placed by CM/SW:    Private pay costs discussed: Not applicable    Additional Information:    [facility] (P: [phone] F: [phone]) who explained that they could not accept pt back if needing Ax-2 24/7 as they do not have night staff to assist pt if needed. They have day time staffing to accommodate his current needs but would not be able to meet his needs for PM hours. If pt progresses in therapy and would not need 24/7 Ax-2 they would be able to accept pt back with homecare PT/OT/RN. Pt insurance is a barrier to homecare, will send referrals and discuss with pt GH if pt mobility progresses. Lorraine explained that their ultimate goal would be for pt to come back to them.    Spoke with pt guardian Irina to discuss discharge plans. Irina explained that she does not want pt to get \"pushed out\" of the hospital. Assured Irina that a safe plan will need to be identified prior to discharge and that this writer will continue to follow up on TCU placement and update Lorraine on pt mobility.     Followed up on pending TCU referrals:  [facility]-Left   [facility]-Left   [facility]-Reviewing with their DON  [facility]-Left   [facility]-Left message for Charline in admissions    Sent additional referral to [facility] per pt guardian request.       CHARAN Akbar, SW  Inpatient Care Coordination  Ortho/Spine Unit  [phone]  Cheryl High, [place]      "

## 2022-12-13 NOTE — PROVIDER NOTIFICATION
"Paged on-call ortho to give results \"Critical lab result: US of RUE positive for DVT\".     Call back from Dr. Arredondo. Provided Dr. Bush's cell.   "

## 2022-12-14 ENCOUNTER — DOCUMENTATION ONLY (OUTPATIENT)
Dept: OTHER | Facility: CLINIC | Age: 87
End: 2022-12-14

## 2022-12-14 ENCOUNTER — APPOINTMENT (OUTPATIENT)
Dept: PHYSICAL THERAPY | Facility: CLINIC | Age: 87
DRG: 480 | End: 2022-12-14
Payer: COMMERCIAL

## 2022-12-14 LAB
CREAT SERPL-MCNC: 0.71 MG/DL (ref 0.67–1.17)
ERYTHROCYTE [DISTWIDTH] IN BLOOD BY AUTOMATED COUNT: 14 % (ref 10–15)
GFR SERPL CREATININE-BSD FRML MDRD: 89 ML/MIN/1.73M2
HCT VFR BLD AUTO: 23.5 % (ref 40–53)
HGB BLD-MCNC: 7.3 G/DL (ref 13.3–17.7)
MCH RBC QN AUTO: 32.4 PG (ref 26.5–33)
MCHC RBC AUTO-ENTMCNC: 31.1 G/DL (ref 31.5–36.5)
MCV RBC AUTO: 104 FL (ref 78–100)
PLATELET # BLD AUTO: 305 10E3/UL (ref 150–450)
RBC # BLD AUTO: 2.25 10E6/UL (ref 4.4–5.9)
WBC # BLD AUTO: 17.7 10E3/UL (ref 4–11)

## 2022-12-14 PROCEDURE — 97530 THERAPEUTIC ACTIVITIES: CPT | Mod: GP

## 2022-12-14 PROCEDURE — 85027 COMPLETE CBC AUTOMATED: CPT | Performed by: HOSPITALIST

## 2022-12-14 PROCEDURE — 99233 SBSQ HOSP IP/OBS HIGH 50: CPT | Performed by: STUDENT IN AN ORGANIZED HEALTH CARE EDUCATION/TRAINING PROGRAM

## 2022-12-14 PROCEDURE — 250N000013 HC RX MED GY IP 250 OP 250 PS 637: Performed by: STUDENT IN AN ORGANIZED HEALTH CARE EDUCATION/TRAINING PROGRAM

## 2022-12-14 PROCEDURE — 82565 ASSAY OF CREATININE: CPT | Performed by: STUDENT IN AN ORGANIZED HEALTH CARE EDUCATION/TRAINING PROGRAM

## 2022-12-14 PROCEDURE — 36415 COLL VENOUS BLD VENIPUNCTURE: CPT | Performed by: HOSPITALIST

## 2022-12-14 PROCEDURE — 250N000013 HC RX MED GY IP 250 OP 250 PS 637: Performed by: HOSPITALIST

## 2022-12-14 PROCEDURE — 120N000001 HC R&B MED SURG/OB

## 2022-12-14 RX ADMIN — ACETAMINOPHEN 975 MG: 325 TABLET, FILM COATED ORAL at 15:12

## 2022-12-14 RX ADMIN — APIXABAN 5 MG: 5 TABLET, FILM COATED ORAL at 08:52

## 2022-12-14 RX ADMIN — OXYCODONE HYDROCHLORIDE AND ACETAMINOPHEN 1000 MG: 500 TABLET ORAL at 08:52

## 2022-12-14 RX ADMIN — APIXABAN 5 MG: 5 TABLET, FILM COATED ORAL at 22:32

## 2022-12-14 RX ADMIN — Medication 50 MCG: at 08:52

## 2022-12-14 RX ADMIN — ACETAMINOPHEN 975 MG: 325 TABLET, FILM COATED ORAL at 22:32

## 2022-12-14 RX ADMIN — MICONAZOLE NITRATE: 20 POWDER TOPICAL at 22:33

## 2022-12-14 RX ADMIN — ACETAMINOPHEN 975 MG: 325 TABLET, FILM COATED ORAL at 05:42

## 2022-12-14 RX ADMIN — SERTRALINE HYDROCHLORIDE 50 MG: 50 TABLET ORAL at 08:52

## 2022-12-14 RX ADMIN — DONEPEZIL HYDROCHLORIDE 5 MG: 5 TABLET ORAL at 22:32

## 2022-12-14 RX ADMIN — Medication 1 TABLET: at 08:52

## 2022-12-14 RX ADMIN — MAGNESIUM OXIDE TAB 400 MG (241.3 MG ELEMENTAL MG) 400 MG: 400 (241.3 MG) TAB at 08:52

## 2022-12-14 ASSESSMENT — ACTIVITIES OF DAILY LIVING (ADL)
ADLS_ACUITY_SCORE: 39
ADLS_ACUITY_SCORE: 41
ADLS_ACUITY_SCORE: 39
ADLS_ACUITY_SCORE: 41
ADLS_ACUITY_SCORE: 39
ADLS_ACUITY_SCORE: 39
ADLS_ACUITY_SCORE: 41
ADLS_ACUITY_SCORE: 39

## 2022-12-14 NOTE — PLAN OF CARE
Goal Outcome Evaluation:      Plan of Care Reviewed With: patient, caregiver    Overall Patient Progress: no changeOverall Patient Progress: no change  Patient vital signs are at baseline: y  Patient able to ambulate as they were prior to admission or with assist devices provided by therapies during their stay:  No,  Reason:  Going to TCU   Patient MUST void prior to discharge:  No,  Reason:  discharging with Tay in place  Patient able to tolerate oral intake:  Yes  Pain has adequate pain control using Oral analgesics:  Yes  Does patient have an identified :  Yes  Has goal D/C date and time been discussed with patient:  Yes    A&O to self. Heparin drip discontinued, PTT rechecked, apixiban restarted. Tay in place. Assist of 2 with virginia gordon. Tolerating regular diet. Awaiting placement.

## 2022-12-14 NOTE — PROGRESS NOTES
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     Initial vitals: Temperature 97.6  F, heart rate 68, blood pressure 145/59, oxygen saturation of 97% on room air.  Right shoulder x-ray: Acute impacted complex proximal humerus fracture with surgical neck and tuberosity involved with surrounding tissue swelling and varus angulation.  Pelvis and right hip x-ray: Acute displaced and angulated intertrochanteric proximal femoral fracture, acetabular fracture could not be ruled out.  Right knee x-ray: Subtle dilation and irregularity of trouble plate 2, concerning for nondisplaced fracture, CT of the knee recommended.  X-ray of the left hand shows left thumb DIP dislocation.     Orthopedic surgery was consulted and the patient is now status post surgical fix of the right hip. Postoperative course has been complicated by progressive anemia and now DVT RUE.      RUE DVT  -noted on US, one of two brachial veins  -Initially on heparin drip, transition Eliquis at 5mg BID instead of usual 10mg initial dosing and watch Hgb closely.    R femur fracture  L open thumb DIP joint fracture/dislocation  R proximal humerus fracture  -Unwitnessed fall.  Patient was reportedly up and trying to move around with his CPAP machine still in place.  Had a subsequent fall.  Suspect mechanical given story.  Unfortunately, this is complicated by right hip fracture, right humeral fracture, possible right knee fracture.   -s/p R intertrochanteric femur fracture w/cephalomedullary device on 12/7/2022/. Are planning for conservative cares regarding the right humeral fracture.  -Orthopedic  surgery following, appreciate recommendations               -WBAT with a walker x6 weeks               -Keep dressing on for 2 weeks, okay for shower               -Osteoporosis work-up and treatment with PCP within 2 months               -Thumb sutures to be removed in 2 weeks.  Continue thumb splint until suture removal  -PT recommend TCU, awaiting placement     Acute normocytic anemia:  -Likely multifactorial in the setting of trauma, fracture, surgery, blood draws, fluid resuscitation.  No obvious evidence for bleeding.  His right shoulder is swollen and therefore he might have some blood collection around the fracture but no overt hematoma noted on exam.    -Hgb dropped from >12 to 7 post-operatively, dilution and bruising contributing    -now w/DVT and changing to full anticocoagulation with close monitoring  -serial labs and transfuse < 7     Urinary retention:  -Episodic urinary retention post-operatively.  Has required straight cath twice.   -Will discharge with schaefer given failure of TOV     Leukocytosis  -Suspect stress demargination in the setting of fall with multiple fractures.    Stable at about 17. No evidence for infection.     Elevated troponin  -Suspect type II demand ischemia in the setting of fall with multiple fractures.  EKG unremarkable.  No chest pain or evidence of ACS.     Parkinson's disease, Lewy body dementia  -Continue PTA Aricept.  Does not appear to be on any Parkinson's disease medications.     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     Severe IVONNE: PTA CPAP     ROSALIA: Continue PTA Zoloft     Age-related macular degeneration     Diet: Advance Diet as Tolerated: Regular Diet Adult  Snacks/Supplements Adult: Ensure Clear; Between Meals    DVT Prophylaxis: DOAC  Schaefer Catheter: PRESENT, indication: Retention, Retention, Retention, Retention  Central Lines: None  Cardiac Monitoring: None  Code Status: No CPR- Do NOT Intubate       Disposition Plan    await stable Hgb, possible discharge next 48 hours    The patient's care was discussed with the Patient's Family.    Thanh Stein MD  Hospitalist Service  [facility]  Securely message with the Vocera Web Console (learn more here)  Text page via [employer] Paging/Directory   ______________________________________________________________________    Interval History   Hemoglobin remained stable. No new complaints but has significant cognitive impairment. Ortho with no plans for surgery.  Having behavioral issues and may need a sitter.    Data reviewed today: I reviewed all medications, new labs and imaging results over the last 24 hours.    Physical Exam   Vital Signs: Temp: 97.9  F (36.6  C) Temp src: Temporal BP: 135/46 Pulse: 67   Resp: 18 SpO2: 93 % O2 Device: None (Room air)    Weight: 174 lbs 9.6 oz  Constitutional: awake, alert  Eyes: pupils equal, round and reactive to light and conjunctiva normal  ENT: normocepalic, without obvious abnormality, atramatic  Respiratory: no increased work of breathing, good air exchange and clear to auscultation  Cardiovascular: regular rate and rhythm and no murmur noted  GI: normal bowel sounds, soft, non-distended and non-tender  Skin: bruising to R arm noted, no rashes  Neurologic: awake, oriented x1-2, decreased ROM R arm in sling    Data   Recent Labs   Lab 12/14/22  0617 12/12/22  1932 12/12/22  0615 12/11/22  0703 12/10/22  1347 12/10/22  0733 12/09/22  0740 12/09/22  0700 12/08/22  0748   WBC 17.7* 17.1*  --   --   --  13.7*  --    < > 17.6*   HGB 7.3* 7.6*  --  7.9*   < > 7.0*  --    < > 8.5*   * 102*  --   --   --  101*  --    < > 100    296 261  --   --  205  --    < > 198   NA  --   --   --   --   --   --  138  --  135*   POTASSIUM  --   --   --   --   --   --  4.5  --  4.2   CHLORIDE  --   --   --   --   --   --  104  --  103   CO2  --   --   --   --   --   --  25  --  24   BUN  --   --   --   --   --   --   21.5  --  23.0   CR 0.71  --   --   --   --   --  0.80  --  0.86   ANIONGAP  --   --   --   --   --   --  9  --  8   FLORENTINO  --   --   --   --   --   --  8.5*  --  8.4*   GLC  --   --   --   --   --   --  117*  --  162*    < > = values in this interval not displayed.     No results found for this or any previous visit (from the past 24 hour(s)).  Medications       acetaminophen  975 mg Oral Q8H     apixaban ANTICOAGULANT  5 mg Oral BID     calcium carbonate-vitamin D  1 tablet Oral Daily     docusate sodium  100 mg Oral BID     donepezil  5 mg Oral At Bedtime     magnesium oxide  400 mg Oral Daily     polyethylene glycol  17 g Oral BID     senna-docusate  1 tablet Oral BID     sertraline  50 mg Oral Daily     sodium chloride (PF)  3 mL Intracatheter Q8H     vitamin C  1,000 mg Oral Daily     cholecalciferol  50 mcg Oral Daily

## 2022-12-14 NOTE — PLAN OF CARE
Patient vital signs are at baseline: Yes  Patient able to ambulate as they were prior to admission or with assist devices provided by therapies during their stay:  No,  Reason: virginia steady  Patient MUST void prior to discharge:  Yes  Patient able to tolerate oral intake:  Yes  Pain has adequate pain control using Oral analgesics:  Yes  Does patient have an identified :  Yes  Has goal D/C date and time been discussed with patient:  No,  Reason:  Pending placement     Pt Alert, d/o time, place, situation. VSS, pt up in chair today for lunch, uses virginia steady to transfer, assist x2, schaefer secured and patent with good output. Tolerating diet well. R arm sling in place. Pills given one at a time or crushed in applesauce. CPAP at night. Pt picking at dressing on thumb, and pulling on schaefer. Put on mitts, not working. Noticed some redness down between legs by schaefer, put barrier cream on it.  May need sitter. Plan pending TCU placement.

## 2022-12-15 ENCOUNTER — APPOINTMENT (OUTPATIENT)
Dept: GENERAL RADIOLOGY | Facility: CLINIC | Age: 87
DRG: 480 | End: 2022-12-15
Attending: INTERNAL MEDICINE
Payer: COMMERCIAL

## 2022-12-15 LAB
ERYTHROCYTE [DISTWIDTH] IN BLOOD BY AUTOMATED COUNT: 14.4 % (ref 10–15)
HCT VFR BLD AUTO: 24.5 % (ref 40–53)
HGB BLD-MCNC: 7.8 G/DL (ref 13.3–17.7)
MCH RBC QN AUTO: 33.3 PG (ref 26.5–33)
MCHC RBC AUTO-ENTMCNC: 31.8 G/DL (ref 31.5–36.5)
MCV RBC AUTO: 105 FL (ref 78–100)
PLATELET # BLD AUTO: 331 10E3/UL (ref 150–450)
RBC # BLD AUTO: 2.34 10E6/UL (ref 4.4–5.9)
WBC # BLD AUTO: 16.7 10E3/UL (ref 4–11)

## 2022-12-15 PROCEDURE — 250N000013 HC RX MED GY IP 250 OP 250 PS 637: Performed by: STUDENT IN AN ORGANIZED HEALTH CARE EDUCATION/TRAINING PROGRAM

## 2022-12-15 PROCEDURE — 250N000013 HC RX MED GY IP 250 OP 250 PS 637: Performed by: INTERNAL MEDICINE

## 2022-12-15 PROCEDURE — 85027 COMPLETE CBC AUTOMATED: CPT | Performed by: HOSPITALIST

## 2022-12-15 PROCEDURE — 250N000013 HC RX MED GY IP 250 OP 250 PS 637: Performed by: PHYSICIAN ASSISTANT

## 2022-12-15 PROCEDURE — 250N000013 HC RX MED GY IP 250 OP 250 PS 637: Performed by: HOSPITALIST

## 2022-12-15 PROCEDURE — 120N000001 HC R&B MED SURG/OB

## 2022-12-15 PROCEDURE — 36415 COLL VENOUS BLD VENIPUNCTURE: CPT | Performed by: HOSPITALIST

## 2022-12-15 PROCEDURE — 71045 X-RAY EXAM CHEST 1 VIEW: CPT

## 2022-12-15 PROCEDURE — 250N000011 HC RX IP 250 OP 636: Performed by: INTERNAL MEDICINE

## 2022-12-15 PROCEDURE — 99233 SBSQ HOSP IP/OBS HIGH 50: CPT | Performed by: INTERNAL MEDICINE

## 2022-12-15 RX ORDER — TAMSULOSIN HYDROCHLORIDE 0.4 MG/1
0.4 CAPSULE ORAL DAILY
Status: DISCONTINUED | OUTPATIENT
Start: 2022-12-15 | End: 2022-12-21 | Stop reason: HOSPADM

## 2022-12-15 RX ADMIN — HYDROMORPHONE HYDROCHLORIDE 1 MG: 2 TABLET ORAL at 01:06

## 2022-12-15 RX ADMIN — Medication 50 MCG: at 08:40

## 2022-12-15 RX ADMIN — APIXABAN 5 MG: 5 TABLET, FILM COATED ORAL at 08:40

## 2022-12-15 RX ADMIN — HYDROMORPHONE HYDROCHLORIDE 2 MG: 2 TABLET ORAL at 22:43

## 2022-12-15 RX ADMIN — MICONAZOLE NITRATE: 20 POWDER TOPICAL at 19:02

## 2022-12-15 RX ADMIN — APIXABAN 5 MG: 5 TABLET, FILM COATED ORAL at 20:44

## 2022-12-15 RX ADMIN — ACETAMINOPHEN 975 MG: 325 TABLET, FILM COATED ORAL at 13:41

## 2022-12-15 RX ADMIN — MICONAZOLE NITRATE: 20 POWDER TOPICAL at 08:39

## 2022-12-15 RX ADMIN — SERTRALINE HYDROCHLORIDE 50 MG: 50 TABLET ORAL at 08:39

## 2022-12-15 RX ADMIN — Medication 1 TABLET: at 08:40

## 2022-12-15 RX ADMIN — MAGNESIUM OXIDE TAB 400 MG (241.3 MG ELEMENTAL MG) 400 MG: 400 (241.3 MG) TAB at 08:40

## 2022-12-15 RX ADMIN — ACETAMINOPHEN 975 MG: 325 TABLET, FILM COATED ORAL at 21:28

## 2022-12-15 RX ADMIN — TAZOBACTAM SODIUM AND PIPERACILLIN SODIUM 4.5 G: 500; 4 INJECTION, SOLUTION INTRAVENOUS at 21:48

## 2022-12-15 RX ADMIN — ACETAMINOPHEN 975 MG: 325 TABLET, FILM COATED ORAL at 05:26

## 2022-12-15 RX ADMIN — HYDROMORPHONE HYDROCHLORIDE 1 MG: 2 TABLET ORAL at 18:45

## 2022-12-15 RX ADMIN — TAZOBACTAM SODIUM AND PIPERACILLIN SODIUM 4.5 G: 500; 4 INJECTION, SOLUTION INTRAVENOUS at 15:49

## 2022-12-15 RX ADMIN — HYDROMORPHONE HYDROCHLORIDE 1 MG: 2 TABLET ORAL at 10:34

## 2022-12-15 RX ADMIN — DONEPEZIL HYDROCHLORIDE 5 MG: 5 TABLET ORAL at 21:29

## 2022-12-15 RX ADMIN — HYDROMORPHONE HYDROCHLORIDE 1 MG: 2 TABLET ORAL at 14:55

## 2022-12-15 RX ADMIN — Medication 1 MG: at 22:43

## 2022-12-15 RX ADMIN — TAMSULOSIN HYDROCHLORIDE 0.4 MG: 0.4 CAPSULE ORAL at 10:13

## 2022-12-15 ASSESSMENT — ACTIVITIES OF DAILY LIVING (ADL)
ADLS_ACUITY_SCORE: 41

## 2022-12-15 NOTE — PLAN OF CARE
Goal Outcome Evaluation: no change    Patient vital signs are at baseline: yes. Room air  Patient able to ambulate as they were prior to admission or with assist devices provided by therapies during their stay:  No,  Reason:  lift versus virginia steady. We used virginia steady 2 assist. Max assist 2.   Patient MUST void prior to discharge:  No,  Reason:  schaefer for retention. Flomax today.   Patient able to tolerate oral intake:  Yes. Encouraged fluids and food. Poor food intake.   Pain has adequate pain control using Oral analgesics:  Yes. Taking tylenol and dilaudid for pain. Sling to right arm. Thumb splint to left thumb.   Does patient have an identified :  Yes. Guardians. Guardian Irina here today.   Has goal D/C date and time been discussed with patient:  Yes. Possible bed at [facility] Monday.     Schaefer this shift. Up in chair with 2 assist, virginia steady. Significant bruising to right arm and right hip. Dilaudid for verbalized pain. Ice to right arm . Tremors to bilateral hands. Can be occassional restless and unable to verbalize needs- usually needs to have a bowel movement when that happens. Amber area red- antifungal powder applied and skin care.     Oriented to self only. Safety, cares per staff. Alarm on for safety.    1427 zosyn order noted. Will get med from pharm.

## 2022-12-15 NOTE — PLAN OF CARE
Goal Outcome Evaluation:      Plan of Care Reviewed With: patient    Overall Patient Progress: improvingOverall Patient Progress: improving     Patient vital signs are at baseline: Yes  Patient able to ambulate as they were prior to admission or with assist devices provided by therapies during their stay:  No,  Reason:  Assist x2 with gait belt and virginia steady.   Patient MUST void prior to discharge:  No,  Reason: schaefer in place for retention.   Patient able to tolerate oral intake:  Yes  Pain has adequate pain control using Oral analgesics:  Yes  Does patient have an identified :  Yes  Has goal D/C date and time been discussed with patient:  Yes     Pt Alert to self. VVS. PIV SL. Total care. Pain managed with scheduled tylenol and PRN Dilaudid. Assist x2 with gait belt and virginia steady. Schaefer patent with good output. 1 formed BM over night shift. Tolerating a regular diet well. Restless legas and arms over night, reused to keep CPAP on. Pt took off dressing on R hip during the day. No order or dressing change. White gauze dressing place to help prevent pt from picking at the incision. Plan is discharge when Hgb is stable. Will continue to monitor.

## 2022-12-15 NOTE — PROGRESS NOTES
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 He presented to the [facility] ED on 12/6/2022 after a fall and was found to have right humeral fracture, right hip fracture, and left open thumb DIP joint fracture with dislocation.     ED evaluation showed temperature 97.6  F, heart rate 68, blood pressure 145/59, and oxygen saturation of 97% on room air.  Laboratory evaluation showed WBC 20.3, hemoglobin 12.6, and troponin 26 (31 on repeat). Right shoulder x-ray showed acute, impacted, complex, proximal humerus fracture with surgical neck and tuberosity involvement and surrounding tissue swelling and varus angulation.  Pelvis and right hip x-ray showed acute, displaced, and angulated intertrochanteric proximal femoral fracture; acetabular fracture could not be ruled out.  Right knee x-ray showed subtle dilation and irregularity of tibial plateau, concerning for nondisplaced fracture.  CT of the knee was recommended.  X-ray of the left hand showed left thumb DIP dislocation. CTs of head and C spine were unremarkable. CT of right knee showed no acute fracture but old healed, mildly depressed fracture of the lateral tibial plateau. Scott was admitted for further cares.     Orthopedic surgery was consulted and the patient had surgical fixation of the right hip with cephalomedullary device and left thumb DIP joint irrigation and excisional debridement. Postoperative course has been complicated by progressive anemia, urine retention, and right upper extremity DVT (diagnosed 12/12/22) for which heparin drip was started with transition to Eliquis. Therapies are recommending TCU on discharge    Problem list:     Right  femur fracture  Left open thumb DIP joint fracture/dislocation  Roght proximal humerus fracture  Unwitnessed fall  S/p surgical fixation of right femur fracture with cephalomedullary device on 12/7/22  S/p left thumb DIP joint irrigation and excisional debridement on 12/7/22  -Unwitnessed fall.  Patient was reportedly up and trying to move around with his CPAP machine still in place.  Had a subsequent fall.  Suspect mechanical fall given story.   -Received perioperative antibiotics (Ancef)  -Post op cares per orthopedic surgery  -PT recommend TCU, awaiting placement    Right upper extremity DVT  -noted on US 12/12/22, one of two brachial veins  -Initially on heparin drip; transitioned Eliquis at 5mg BID   -Monitor Hgb closely.     Acute normocytic anemia  -Likely multifactorial in the setting of trauma, fracture, surgery, blood draws, fluid resuscitation.  No obvious bleeding.     -Hgb dropped from >12 pre-op to 7 post-op, dilution and bruising contributing    -Now w/DVT and on Eliquis  -Serial labs and transfuse < 7     Urinary retention  History of prostate cancer  -Episodic urinary retention post-operatively.  Required straight cath twice. Tay placed earlier in stay.  -Start Flomax, empirically  -Consider Tay removal for voiding trial once on Flomax for a few days.      Leukocytosis  -Suspect stress demargination in the setting of fall with multiple fractures.    Stable at about 17. No evidence for infection.  -Check CXR     Elevated troponin  -Suspect type II demand ischemia in the setting of fall with multiple fractures.  EKG unremarkable.  No chest pain or evidence of ACS.     Parkinson's disease, Lewy body dementia  -Continue PTA Aricept.  Does not appear to be on any Parkinson's disease medications.     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     Severe IVONNE  -PTA CPAP     Generalized anxiety disorder  -Continue PTA Zoloft     Age-related macular  "degeneration       Diet: Advance Diet as Tolerated: Regular Diet Adult  Snacks/Supplements Adult: Ensure Clear; Between Meals    DVT Prophylaxis: DOAC  Tay Catheter: PRESENT, indication: Retention, Retention, Retention, Retention  Central Lines: None  Cardiac Monitoring: None  Code Status: No CPR- Do NOT Intubate      Disposition Plan      Expected Discharge Date: 12/16/2022    Discharge Delays: Placement - TCU  Destination: nursing home  Discharge Comments: [facility].        The patient's care was discussed with the Bedside Nurse, Patient and Patient's Family.    Adan Alcazar MD  Hospitalist Service  [facility]  Securely message with the Vocera Web Console (learn more here)  Text page via [employer] Paging/Directory         Clinically Significant Risk Factors                        # Overweight: Estimated body mass index is 25.78 kg/m  as calculated from the following:    Height as of this encounter: 1.753 m (5' 9\").    Weight as of this encounter: 79.2 kg (174 lb 9.6 oz).          ______________________________________________________________________    Interval History   No new problems. Pulls at Tay some. Splint on left thumb seems to bother him some.     Data reviewed today: I reviewed all medications, new labs and imaging results over the last 24 hours. I personally reviewed no images or EKG's today.    Physical Exam   Vital Signs: Temp: 98.1  F (36.7  C) Temp src: Temporal BP: (!) 147/54 Pulse: 71   Resp: 16 SpO2: 96 % O2 Device: BiPAP/CPAP    Weight: 174 lbs 9.6 oz  GENERAL:  Comfortable. Cooperative.  PSYCH: pleasant, disoriented, No acute distress.  EYES: PERRLA, Normal conjunctiva.  HEART:  Regular rate and rhythm. No JVD. Pulses normal. No edema.  LUNGS:  Clear to auscultation, normal Respiratory effort.  ABDOMEN:  Soft, no hepatosplenomegaly, normal bowel sounds.  EXTREMETIES: No clubbing, cyanosis or ischemia  SKIN:  Dry to touch, No rash.      Data   Recent Labs "   Lab 12/15/22  0822 12/14/22  0617 12/12/22  1932 12/10/22  0733 12/09/22  0740   WBC 16.7* 17.7* 17.1*   < >  --    HGB 7.8* 7.3* 7.6*   < >  --    * 104* 102*   < >  --     305 296   < >  --    NA  --   --   --   --  138   POTASSIUM  --   --   --   --  4.5   CHLORIDE  --   --   --   --  104   CO2  --   --   --   --  25   BUN  --   --   --   --  21.5   CR  --  0.71  --   --  0.80   ANIONGAP  --   --   --   --  9   FLORENTINO  --   --   --   --  8.5*   GLC  --   --   --   --  117*    < > = values in this interval not displayed.

## 2022-12-15 NOTE — PLAN OF CARE
Goal Outcome Evaluation:         Patient vital signs are at baseline: Yes  Patient able to ambulate as they were prior to admission or with assist devices provided by therapies during their stay:  No,  Reason:  A2 virginia steady  Patient MUST void prior to discharge:  No,  Reason:  discharge with schaefer  Patient able to tolerate oral intake:  Yes  Pain has adequate pain control using Oral analgesics:  Yes  Does patient have an identified :  Yes  Has goal D/C date and time been discussed with patient:  Yes        Patient alert to self. Feeder. A2 virginia steady. Up in chair for dinner. Uses commode for bathroom. One large BM on shift. No attempts at removing schaefer on shift.

## 2022-12-15 NOTE — PROGRESS NOTES
Care Management Follow Up    Length of Stay (days): 9    Expected Discharge Date: 12/16/2022     Concerns to be Addressed:       Patient plan of care discussed at interdisciplinary rounds: No    Anticipated Discharge Disposition:       Anticipated Discharge Services:    Anticipated Discharge DME:      Patient/family educated on Medicare website which has current facility and service quality ratings:    Education Provided on the Discharge Plan:    Patient/Family in Agreement with the Plan:      Referrals Placed by CM/SW:    Private pay costs discussed: transportation costs    Additional Information:    Pt accepted to [facility] for placement. Spoke with IFEOMA Lopes (P: [phone]) who explained that she will complete the OBRA level II and should have it completed by Monday 12/19.    Spoke with [facility] who explained that this discharge date is acceptable and they would just want an update on a ride time. Will call over the weekend to set a ride for Monday 12/19 going to [facility].     Met with pt vero who is his guardian at bedside. Pt vero is aware and agreeable to the discharge plan.     CHARAN Akbar, [facility]  Inpatient Care Coordination  Ortho/Spine Unit  [phone]  Cheryl High, SW

## 2022-12-15 NOTE — PROGRESS NOTES
Orthopedic Progress Note    I was asked to present to bedside by RN for questions from the patient's niece, Irina, who is his legal guardian. She was curious how it works for his 2 week postop if he is still inpatient or at a TCU as she does not . I reassured her that we can see him that day if he is still inpatient for repeat imaging and suture removal, and if he is at a TCU, these cares can be completed there and the x-rays can be sent to Dr. Horowitz at [facility]. Orders updated. Also, RN noted that the patient pulled his dressing off overnight and an island dressing was placed. This is fine, but I advised her to not allow the area to get wet. Should there be any other dressing issues, would recommend changing to Aquacel dressing. We will plan to follow peripherally until POD#14. Please contact ortho with any questions or concerns.    Patti Hector PA-C  [facility]

## 2022-12-16 ENCOUNTER — APPOINTMENT (OUTPATIENT)
Dept: PHYSICAL THERAPY | Facility: CLINIC | Age: 87
DRG: 480 | End: 2022-12-16
Payer: COMMERCIAL

## 2022-12-16 LAB
ERYTHROCYTE [DISTWIDTH] IN BLOOD BY AUTOMATED COUNT: 14.7 % (ref 10–15)
HCT VFR BLD AUTO: 23.6 % (ref 40–53)
HGB BLD-MCNC: 7.5 G/DL (ref 13.3–17.7)
MCH RBC QN AUTO: 33.5 PG (ref 26.5–33)
MCHC RBC AUTO-ENTMCNC: 31.8 G/DL (ref 31.5–36.5)
MCV RBC AUTO: 105 FL (ref 78–100)
PLATELET # BLD AUTO: 333 10E3/UL (ref 150–450)
RBC # BLD AUTO: 2.24 10E6/UL (ref 4.4–5.9)
WBC # BLD AUTO: 15.2 10E3/UL (ref 4–11)

## 2022-12-16 PROCEDURE — 97530 THERAPEUTIC ACTIVITIES: CPT | Mod: GP

## 2022-12-16 PROCEDURE — 250N000013 HC RX MED GY IP 250 OP 250 PS 637: Performed by: INTERNAL MEDICINE

## 2022-12-16 PROCEDURE — 120N000001 HC R&B MED SURG/OB

## 2022-12-16 PROCEDURE — 250N000013 HC RX MED GY IP 250 OP 250 PS 637: Performed by: HOSPITALIST

## 2022-12-16 PROCEDURE — 36415 COLL VENOUS BLD VENIPUNCTURE: CPT | Performed by: HOSPITALIST

## 2022-12-16 PROCEDURE — 250N000013 HC RX MED GY IP 250 OP 250 PS 637: Performed by: STUDENT IN AN ORGANIZED HEALTH CARE EDUCATION/TRAINING PROGRAM

## 2022-12-16 PROCEDURE — 85027 COMPLETE CBC AUTOMATED: CPT | Performed by: HOSPITALIST

## 2022-12-16 PROCEDURE — 99232 SBSQ HOSP IP/OBS MODERATE 35: CPT | Performed by: INTERNAL MEDICINE

## 2022-12-16 PROCEDURE — 250N000011 HC RX IP 250 OP 636: Performed by: INTERNAL MEDICINE

## 2022-12-16 PROCEDURE — 250N000013 HC RX MED GY IP 250 OP 250 PS 637: Performed by: PHYSICIAN ASSISTANT

## 2022-12-16 RX ADMIN — DONEPEZIL HYDROCHLORIDE 5 MG: 5 TABLET ORAL at 22:39

## 2022-12-16 RX ADMIN — Medication 50 MCG: at 09:12

## 2022-12-16 RX ADMIN — SENNOSIDES AND DOCUSATE SODIUM 1 TABLET: 50; 8.6 TABLET ORAL at 20:07

## 2022-12-16 RX ADMIN — POLYETHYLENE GLYCOL 3350 17 G: 17 POWDER, FOR SOLUTION ORAL at 20:07

## 2022-12-16 RX ADMIN — OXYCODONE HYDROCHLORIDE AND ACETAMINOPHEN 1000 MG: 500 TABLET ORAL at 09:12

## 2022-12-16 RX ADMIN — TAZOBACTAM SODIUM AND PIPERACILLIN SODIUM 4.5 G: 500; 4 INJECTION, SOLUTION INTRAVENOUS at 11:09

## 2022-12-16 RX ADMIN — SENNOSIDES AND DOCUSATE SODIUM 1 TABLET: 50; 8.6 TABLET ORAL at 09:12

## 2022-12-16 RX ADMIN — ACETAMINOPHEN 975 MG: 325 TABLET, FILM COATED ORAL at 05:09

## 2022-12-16 RX ADMIN — SERTRALINE HYDROCHLORIDE 50 MG: 50 TABLET ORAL at 09:12

## 2022-12-16 RX ADMIN — Medication 1 TABLET: at 09:12

## 2022-12-16 RX ADMIN — MAGNESIUM OXIDE TAB 400 MG (241.3 MG ELEMENTAL MG) 400 MG: 400 (241.3 MG) TAB at 09:12

## 2022-12-16 RX ADMIN — TAMSULOSIN HYDROCHLORIDE 0.4 MG: 0.4 CAPSULE ORAL at 09:12

## 2022-12-16 RX ADMIN — HYDROMORPHONE HYDROCHLORIDE 2 MG: 2 TABLET ORAL at 05:26

## 2022-12-16 RX ADMIN — TAZOBACTAM SODIUM AND PIPERACILLIN SODIUM 4.5 G: 500; 4 INJECTION, SOLUTION INTRAVENOUS at 17:10

## 2022-12-16 RX ADMIN — DOCUSATE SODIUM 100 MG: 100 CAPSULE, LIQUID FILLED ORAL at 20:07

## 2022-12-16 RX ADMIN — ACETAMINOPHEN 975 MG: 325 TABLET, FILM COATED ORAL at 15:02

## 2022-12-16 RX ADMIN — HYDROMORPHONE HYDROCHLORIDE 2 MG: 2 TABLET ORAL at 09:58

## 2022-12-16 RX ADMIN — MICONAZOLE NITRATE: 20 POWDER TOPICAL at 20:08

## 2022-12-16 RX ADMIN — DOCUSATE SODIUM 100 MG: 100 CAPSULE, LIQUID FILLED ORAL at 09:12

## 2022-12-16 RX ADMIN — APIXABAN 5 MG: 5 TABLET, FILM COATED ORAL at 20:07

## 2022-12-16 RX ADMIN — POLYETHYLENE GLYCOL 3350 17 G: 17 POWDER, FOR SOLUTION ORAL at 09:13

## 2022-12-16 RX ADMIN — APIXABAN 5 MG: 5 TABLET, FILM COATED ORAL at 09:12

## 2022-12-16 RX ADMIN — TAZOBACTAM SODIUM AND PIPERACILLIN SODIUM 4.5 G: 500; 4 INJECTION, SOLUTION INTRAVENOUS at 22:47

## 2022-12-16 RX ADMIN — MICONAZOLE NITRATE: 20 POWDER TOPICAL at 09:13

## 2022-12-16 RX ADMIN — ACETAMINOPHEN 975 MG: 325 TABLET, FILM COATED ORAL at 22:39

## 2022-12-16 RX ADMIN — TAZOBACTAM SODIUM AND PIPERACILLIN SODIUM 4.5 G: 500; 4 INJECTION, SOLUTION INTRAVENOUS at 03:53

## 2022-12-16 ASSESSMENT — ACTIVITIES OF DAILY LIVING (ADL)
ADLS_ACUITY_SCORE: 45
ADLS_ACUITY_SCORE: 41
ADLS_ACUITY_SCORE: 41
ADLS_ACUITY_SCORE: 45
ADLS_ACUITY_SCORE: 41
ADLS_ACUITY_SCORE: 45
ADLS_ACUITY_SCORE: 41
ADLS_ACUITY_SCORE: 45

## 2022-12-16 NOTE — PROGRESS NOTES
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 He presented to the [facility] ED on 12/6/2022 after a fall and was found to have right humeral fracture, right hip fracture, and left open thumb DIP joint fracture with dislocation.     ED evaluation showed temperature 97.6  F, heart rate 68, blood pressure 145/59, and oxygen saturation of 97% on room air.  Laboratory evaluation showed WBC 20.3, hemoglobin 12.6, and troponin 26 (31 on repeat). Right shoulder x-ray showed acute, impacted, complex, proximal humerus fracture with surgical neck and tuberosity involvement and surrounding tissue swelling and varus angulation.  Pelvis and right hip x-ray showed acute, displaced, and angulated intertrochanteric proximal femoral fracture; acetabular fracture could not be ruled out.  Right knee x-ray showed subtle dilation and irregularity of tibial plateau, concerning for nondisplaced fracture.  CT of the knee was recommended.  X-ray of the left hand showed left thumb DIP dislocation. CTs of head and C spine were unremarkable. CT of right knee showed no acute fracture but old healed, mildly depressed fracture of the lateral tibial plateau. Scott was admitted for further cares.     Orthopedic surgery was consulted and the patient had surgical fixation of the right hip with cephalomedullary device and left thumb DIP joint irrigation and excisional debridement. Postoperative course has been complicated by progressive anemia, urine retention, and right upper extremity DVT (diagnosed 12/12/22) for which heparin drip was started with transition to Eliquis. Therapies recommended TCU on discharge    Problem list:     Right femur  fracture  Left open thumb DIP joint fracture/dislocation  Roght proximal humerus fracture  Unwitnessed fall  S/p surgical fixation of right femur fracture with cephalomedullary device on 12/7/22  S/p left thumb DIP joint irrigation and excisional debridement on 12/7/22  -Unwitnessed fall.  Patient was reportedly up and trying to move around with his CPAP machine still in place.  Had a subsequent fall.  Suspect mechanical fall given story.   -Received perioperative antibiotics (Ancef)  -Post op cares per orthopedic surgery  -PT recommend TCU, awaiting placement tentatively on Monday    Right upper extremity DVT  -noted on US 12/12/22, one of two brachial veins  -Initially on heparin drip; transitioned Eliquis at 5mg BID   -Monitor Hgb closely.     Acute normocytic anemia  -Likely multifactorial in the setting of trauma, fracture, surgery, blood draws, fluid resuscitation.  No obvious bleeding.     -Hgb dropped from >12 pre-op to 7 post-op, dilution and bruising contributing    -Now w/DVT and on Eliquis  -Serial labs and transfuse < 7     Urinary retention  History of prostate cancer  -Episodic urinary retention post-operatively.  Required straight cath twice. Tay placed earlier in stay.  -Started Flomax empirically on 12/15  -Consider Tay removal for voiding trial once on Flomax for a few days- maybe tomorrow or Sunday      Leukocytosis  -Suspect stress demargination in the setting of fall with multiple fractures.    Stable at about 17. No evidence for infection.  -Check CXR     Elevated troponin  -Suspect type II demand ischemia in the setting of fall with multiple fractures.  EKG unremarkable.  No chest pain or evidence of ACS.     Parkinson's disease, Lewy body dementia  -Continue PTA Aricept.  Does not appear to be on any Parkinson's disease medications.     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     Severe IVONNE  -PTA CPAP     Generalized anxiety  "disorder  -Continue PTA Zoloft     Age-related macular degeneration         Diet: Advance Diet as Tolerated: Regular Diet Adult  Snacks/Supplements Adult: Ensure Enlive; Between Meals    DVT Prophylaxis: DOAC  Tay Catheter: PRESENT, indication: Retention, Retention, Retention, Retention  Central Lines: None  Cardiac Monitoring: None  Code Status: No CPR- Do NOT Intubate      Disposition Plan      Expected Discharge Date: 12/19/2022,  9:00 AM  Discharge Delays: Placement - TCU  Destination: nursing home  Discharge Comments: [facility].        The patient's care was discussed with the Bedside Nurse and Patient.    Adan Alcazar MD  Hospitalist Service  [facility]  Securely message with the Vocera Web Console (learn more here)  Text page via [employer] Paging[email or web address]         Clinically Significant Risk Factors                        # Overweight: Estimated body mass index is 25.78 kg/m  as calculated from the following:    Height as of this encounter: 1.753 m (5' 9\").    Weight as of this encounter: 79.2 kg (174 lb 9.6 oz).          ______________________________________________________________________    Interval History   No new problems. Pain seems controlled.     Data reviewed today: I reviewed all medications, new labs and imaging results over the last 24 hours. I personally reviewed no images or EKG's today.    Physical Exam   Vital Signs: Temp: 97  F (36.1  C) Temp src: Temporal BP: 136/47 Pulse: 69   Resp: 18 SpO2: 100 % O2 Device: None (Room air)    Weight: 174 lbs 9.6 oz  GENERAL:  Comfortable. Cooperative.  PSYCH: pleasant, oriented, No acute distress.  EYES: PERRLA, Normal conjunctiva.  HEART:  Regular rate and rhythm. No JVD. Pulses normal. No edema.  LUNGS:  Clear to auscultation, normal Respiratory effort.  ABDOMEN:  Soft, no hepatosplenomegaly, normal bowel sounds.  EXTREMETIES: No clubbing, cyanosis or ischemia  SKIN:  Dry to touch, No rash.      Data   Recent Labs "   Lab 12/16/22  0728 12/15/22  0822 12/14/22  0617   WBC 15.2* 16.7* 17.7*   HGB 7.5* 7.8* 7.3*   * 105* 104*    331 305   CR  --   --  0.71

## 2022-12-16 NOTE — PLAN OF CARE
Goal Outcome Evaluation:    Pt alert and oriented to self, VSS, CPAP on overnight. Pt resting comfortably, restless and calling out at times- easily consolable. Scheduled Tylenol for pain. CMS difficult to assess d/t pt's developmental status. Dressing CDI, pulses palpable. Tay patent, urine output 200 mls in 6 hours. Discharge on Monday to AV TCU.

## 2022-12-16 NOTE — PLAN OF CARE
Goal Outcome Evaluation:      Plan of Care Reviewed With: caregiver     Patient vital signs are at baseline: Yes  Patient able to ambulate as they were prior to admission or with assist devices provided by therapies during their stay: NO assist x2 with Marycruz Steady. Up in chair.   Patient MUST void prior to discharge:  NO, schaefer in place.  Patient able to tolerate oral intake:  Yes  Pain has adequate pain control using Oral analgesics:  Yes  Does patient have an identified :  Yes  Has goal D/C date and time been discussed with patient:  Yes, possible discharge on Monday.     Pt alert and oriented to self. Scheduled Tylenol for pain. Dressing CDI. Schaefer patent, urine output 325 cc. Discharge on Monday to TCU. On Zosyn- possible pneumonia.

## 2022-12-16 NOTE — PLAN OF CARE
Goal Outcome Evaluation:      Plan of Care Reviewed With: caregiver      Patient vital signs are at baseline: Yes  Patient able to ambulate as they were prior to admission or with assist devices provided by therapies during their stay: NO assist x2 with Marycruz Steady  Patient MUST void prior to discharge:  NO, schaefer in place.  Patient able to tolerate oral intake:  Yes  Pain has adequate pain control using Oral analgesics:  Yes  Does patient have an identified :  Yes  Has goal D/C date and time been discussed with patient:  Yes, possible discharge on Monday, 12/19, pending TCU placement.    Chest x-ray today showing bibasilar opacities. Zosyn started d/t possible pneumonia. Schaefer in place d/t retention. FlowMax started today with goal of removing Schaefer in a few days. Personal caregiver brought [employer] for dinner. Agitated throughout the shift and tugging on Schaefer. Vocalizing on an off. PRN dilaudid given. Brief in place over schaefer. Mits in place.

## 2022-12-16 NOTE — PROGRESS NOTES
[facility]UAL HE[facility] ([facility])  SPIRITUAL ASSESSMENT Progress Note  [facility]. [address]      REFERRAL SOURCE: LOS     Mr Elroy was sitting up in his chair but unable to communicate his needs. He was able to page the nurse during the visit.      PLAN: Spiritual is available for emotional and spiritual support as needed.      Kristel Davis, Ph.D., [facility]      [facility] available 24/7 for emergency requests/referrals, either by having the on-call  paged or by entering an ASAP/STAT consult in Epic (this will also page the on-call ).

## 2022-12-17 LAB
ABO/RH(D): NORMAL
ANTIBODY SCREEN: NEGATIVE
BLD PROD TYP BPU: NORMAL
BLOOD COMPONENT TYPE: NORMAL
CODING SYSTEM: NORMAL
CROSSMATCH: NORMAL
GLUCOSE BLDC GLUCOMTR-MCNC: 121 MG/DL (ref 70–99)
HGB BLD-MCNC: 5.7 G/DL (ref 13.3–17.7)
ISSUE DATE AND TIME: NORMAL
SPECIMEN EXPIRATION DATE: NORMAL
UNIT ABO/RH: NORMAL
UNIT NUMBER: NORMAL
UNIT STATUS: NORMAL
UNIT TYPE ISBT: 600

## 2022-12-17 PROCEDURE — 250N000013 HC RX MED GY IP 250 OP 250 PS 637: Performed by: INTERNAL MEDICINE

## 2022-12-17 PROCEDURE — 86901 BLOOD TYPING SEROLOGIC RH(D): CPT | Performed by: INTERNAL MEDICINE

## 2022-12-17 PROCEDURE — 250N000013 HC RX MED GY IP 250 OP 250 PS 637: Performed by: HOSPITALIST

## 2022-12-17 PROCEDURE — 86923 COMPATIBILITY TEST ELECTRIC: CPT | Performed by: STUDENT IN AN ORGANIZED HEALTH CARE EDUCATION/TRAINING PROGRAM

## 2022-12-17 PROCEDURE — 200N000001 HC R&B ICU

## 2022-12-17 PROCEDURE — 36415 COLL VENOUS BLD VENIPUNCTURE: CPT | Performed by: INTERNAL MEDICINE

## 2022-12-17 PROCEDURE — P9016 RBC LEUKOCYTES REDUCED: HCPCS | Performed by: STUDENT IN AN ORGANIZED HEALTH CARE EDUCATION/TRAINING PROGRAM

## 2022-12-17 PROCEDURE — 250N000013 HC RX MED GY IP 250 OP 250 PS 637: Performed by: STUDENT IN AN ORGANIZED HEALTH CARE EDUCATION/TRAINING PROGRAM

## 2022-12-17 PROCEDURE — 250N000013 HC RX MED GY IP 250 OP 250 PS 637: Performed by: PHYSICIAN ASSISTANT

## 2022-12-17 PROCEDURE — 258N000003 HC RX IP 258 OP 636: Performed by: INTERNAL MEDICINE

## 2022-12-17 PROCEDURE — 86850 RBC ANTIBODY SCREEN: CPT | Performed by: INTERNAL MEDICINE

## 2022-12-17 PROCEDURE — 99233 SBSQ HOSP IP/OBS HIGH 50: CPT | Performed by: INTERNAL MEDICINE

## 2022-12-17 PROCEDURE — 85018 HEMOGLOBIN: CPT | Performed by: INTERNAL MEDICINE

## 2022-12-17 PROCEDURE — 250N000011 HC RX IP 250 OP 636: Performed by: INTERNAL MEDICINE

## 2022-12-17 RX ORDER — SODIUM CHLORIDE, SODIUM LACTATE, POTASSIUM CHLORIDE, CALCIUM CHLORIDE 600; 310; 30; 20 MG/100ML; MG/100ML; MG/100ML; MG/100ML
INJECTION, SOLUTION INTRAVENOUS CONTINUOUS
Status: DISCONTINUED | OUTPATIENT
Start: 2022-12-18 | End: 2022-12-21 | Stop reason: HOSPADM

## 2022-12-17 RX ORDER — POLYETHYLENE GLYCOL 3350 17 G/17G
17 POWDER, FOR SOLUTION ORAL DAILY PRN
Status: DISCONTINUED | OUTPATIENT
Start: 2022-12-17 | End: 2022-12-21 | Stop reason: HOSPADM

## 2022-12-17 RX ADMIN — ACETAMINOPHEN 975 MG: 325 TABLET, FILM COATED ORAL at 05:16

## 2022-12-17 RX ADMIN — AMOXICILLIN AND CLAVULANATE POTASSIUM 1 TABLET: 875; 125 TABLET, FILM COATED ORAL at 10:29

## 2022-12-17 RX ADMIN — MICONAZOLE NITRATE: 20 POWDER TOPICAL at 09:10

## 2022-12-17 RX ADMIN — ACETAMINOPHEN 975 MG: 325 TABLET, FILM COATED ORAL at 13:30

## 2022-12-17 RX ADMIN — MAGNESIUM OXIDE TAB 400 MG (241.3 MG ELEMENTAL MG) 400 MG: 400 (241.3 MG) TAB at 09:06

## 2022-12-17 RX ADMIN — TAMSULOSIN HYDROCHLORIDE 0.4 MG: 0.4 CAPSULE ORAL at 09:06

## 2022-12-17 RX ADMIN — OXYCODONE HYDROCHLORIDE AND ACETAMINOPHEN 1000 MG: 500 TABLET ORAL at 09:06

## 2022-12-17 RX ADMIN — MICONAZOLE NITRATE: 20 POWDER TOPICAL at 21:29

## 2022-12-17 RX ADMIN — TAZOBACTAM SODIUM AND PIPERACILLIN SODIUM 4.5 G: 500; 4 INJECTION, SOLUTION INTRAVENOUS at 04:58

## 2022-12-17 RX ADMIN — APIXABAN 5 MG: 5 TABLET, FILM COATED ORAL at 09:05

## 2022-12-17 RX ADMIN — Medication 50 MCG: at 09:06

## 2022-12-17 RX ADMIN — SERTRALINE HYDROCHLORIDE 50 MG: 50 TABLET ORAL at 09:06

## 2022-12-17 RX ADMIN — Medication 1 MG: at 23:36

## 2022-12-17 RX ADMIN — Medication 1 TABLET: at 09:06

## 2022-12-17 RX ADMIN — SODIUM CHLORIDE, POTASSIUM CHLORIDE, SODIUM LACTATE AND CALCIUM CHLORIDE: 600; 310; 30; 20 INJECTION, SOLUTION INTRAVENOUS at 23:48

## 2022-12-17 RX ADMIN — AMOXICILLIN AND CLAVULANATE POTASSIUM 1 TABLET: 875; 125 TABLET, FILM COATED ORAL at 21:29

## 2022-12-17 RX ADMIN — HYDROMORPHONE HYDROCHLORIDE 1 MG: 2 TABLET ORAL at 23:36

## 2022-12-17 RX ADMIN — DONEPEZIL HYDROCHLORIDE 5 MG: 5 TABLET ORAL at 23:36

## 2022-12-17 RX ADMIN — ACETAMINOPHEN 975 MG: 325 TABLET, FILM COATED ORAL at 23:36

## 2022-12-17 ASSESSMENT — ACTIVITIES OF DAILY LIVING (ADL)
ADLS_ACUITY_SCORE: 45
ADLS_ACUITY_SCORE: 45
ADLS_ACUITY_SCORE: 49
ADLS_ACUITY_SCORE: 49
ADLS_ACUITY_SCORE: 53
ADLS_ACUITY_SCORE: 51
ADLS_ACUITY_SCORE: 49
ADLS_ACUITY_SCORE: 45
ADLS_ACUITY_SCORE: 49
ADLS_ACUITY_SCORE: 45
ADLS_ACUITY_SCORE: 49
ADLS_ACUITY_SCORE: 45

## 2022-12-17 NOTE — PROGRESS NOTES
GI - Remote entry    Consult received for bloody stool.   Chart reviewed - presented after fall, multiple fractures.  UE DVT thus on heparin.  Progressive anemia during this hospital stay - eliquis now on hold.  BUN has been trending downward making UGI bleed less likely.     Full consult tomorrow, but could consider CTA this afternoon/evening to localize and risk stratify rapidity of bleed.      Discussed with Dr. Alcazar - assuming stable overnight, will offer flex sig tomorrow.  NPO at MN.    Please call with questions.     Dani Morel DO   [facility]  Cell [phone]

## 2022-12-17 NOTE — PLAN OF CARE
Alert and oriented to self only. Tylenol for pain. IV SL. Medications crushed in applesauce. R heel red and blanchable. Troy. Heels elevated off bed  L thumb in splint- changed, Sling RUE. Dressing CDI to R hip  Loose BM x 3 this shfit. Tay out at 1035- DTV Bladder scan 156 at 1422- pt voided at 1500

## 2022-12-17 NOTE — PROGRESS NOTES
Goal Outcome Evaluation.      Pt alert and oriented to self, vital sign stable,  Pain controlled with Scheduled Tylenol. Dressing intact, patient voided via Tay cathete, saline lock. Plan for discharge on Monday to AV TCU.

## 2022-12-17 NOTE — PLAN OF CARE
Goal Outcome Evaluation:    3pm-11pm RN    Patient VS are at baseline:Yes  Patient able to ambulate as they were prior to admission or with assist devices provided by therapy during their stay: No is using the [place] steady with assist of two.  Patient must void prior to discharge: No has a schaefer catheter  Patient able to tolerate oral intake: Yes  Patient has adequate pain control using oral analgesics: Yes    Patient has a schaefer catheter due to retention which is patent and is draining dark lowell urine. Tylenol used for pain management, dressing CDI on R hip, splint on left thumb intact. Has a sling on right arm. Sitter at bedside, continues on IV Zosyn. Plan is to discharge to TCU on Monday.

## 2022-12-17 NOTE — PROGRESS NOTES
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 He presented to the [facility] ED on 12/6/2022 after a fall and was found to have right humeral fracture, right hip fracture, and left open thumb DIP joint fracture with dislocation.     ED evaluation showed temperature 97.6  F, heart rate 68, blood pressure 145/59, and oxygen saturation of 97% on room air.  Laboratory evaluation showed WBC 20.3, hemoglobin 12.6, and troponin 26 (31 on repeat). Right shoulder x-ray showed acute, impacted, complex, proximal humerus fracture with surgical neck and tuberosity involvement and surrounding tissue swelling and varus angulation.  Pelvis and right hip x-ray showed acute, displaced, and angulated intertrochanteric proximal femoral fracture; acetabular fracture could not be ruled out.  Right knee x-ray showed subtle dilation and irregularity of tibial plateau, concerning for nondisplaced fracture.  CT of the knee was recommended.  X-ray of the left hand showed left thumb DIP dislocation. CTs of head and C spine were unremarkable. CT of right knee showed no acute fracture but old healed, mildly depressed fracture of the lateral tibial plateau. Scott was admitted for further cares.     Orthopedic surgery was consulted and the patient had surgical fixation of the right hip with cephalomedullary device and left thumb DIP joint irrigation and excisional debridement. Postoperative course has been complicated by progressive anemia, urine retention, and right upper extremity DVT (diagnosed 12/12/22) for which heparin drip was started with transition to Eliquis. Therapies recommended TCU on discharge. TCU placement was arranged  tentatively for 12/19/22.    Problem list:    Addendum:   Lower GI bleed  -I was called by nursing for large bloody stool.   -Type and screen at 6pm. Hgb at 6 pm and MN. AM CBC  -Consult GI- Flex sig? Or colonoscopy with this bleed and preference for anticoagulation if possible for UE DVT (see below)       Right femur fracture  Left open thumb DIP joint fracture/dislocation  Roght proximal humerus fracture  Unwitnessed fall  S/p surgical fixation of right femur fracture with cephalomedullary device on 12/7/22  S/p left thumb DIP joint irrigation and excisional debridement on 12/7/22  -Unwitnessed fall.  Patient was reportedly up and trying to move around with his CPAP machine still in place.  Had a subsequent fall.  Suspect mechanical fall given story.   -Received perioperative antibiotics (Ancef)  -Post op cares per orthopedic surgery  -PT recommend TCU, awaiting placement tentatively on Monday    Right upper extremity DVT  -noted on US 12/12/22, one of two brachial veins  -Initially on heparin drip; transitioned Eliquis at 5mg BID   -Monitor Hgb closely.     Acute normocytic anemia  -Likely multifactorial in the setting of trauma, fracture, surgery, blood draws, fluid resuscitation.  No obvious bleeding.     -Hgb dropped from >12 pre-op to 7 post-op, dilution and bruising contributing    -Now w/DVT and on Eliquis  -Serial labs and transfuse < 7     Urinary retention  History of prostate cancer  -Episodic urinary retention post-operatively.  Required straight cath twice. Tay placed earlier in stay.  -Started Flomax empirically on 12/15  -Pulling at Tay with confusion  -Try removing Tay (has had 3 doses of Flomax)     Leukocytosis  Possible health care associated pneumonia  -Suspect stress demargination in the setting of fall with multiple fractures.    Stable at about 17. No evidence for infection.  -Checked CXR and bibasilar infiltrates were noted- empiric treating for HCAP with Zosyn. Change to Augmentin.  "Plan on 7 days of treatment  -AM CBC     Elevated troponin  -Suspect type II demand ischemia in the setting of fall with multiple fractures.  EKG unremarkable.  No chest pain or evidence of ACS.     Parkinson's disease, Lewy body dementia  -Continue PTA Aricept.  Does not appear to be on any Parkinson's disease medications.     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     Severe IVONNE  -PTA CPAP     Generalized anxiety disorder  -Continue PTA Zoloft     Age-related macular degeneration           Diet: Advance Diet as Tolerated: Regular Diet Adult  Snacks/Supplements Adult: Ensure Enlive; Between Meals    DVT Prophylaxis: DOAC  Tay Catheter: PRESENT, indication: Retention, Retention, Retention, Retention  Central Lines: None  Cardiac Monitoring: None  Code Status: No CPR- Do NOT Intubate      Disposition Plan     Expected Discharge Date: 12/19/2022,  9:00 AM  Discharge Delays: Placement - TCU  Destination: nursing home  Discharge Comments: [facility].        The patient's care was discussed with the Bedside Nurse and Patient.    Adan Alcazar MD  Hospitalist Service  [facility]  Securely message with the Vocera Web Console (learn more here)  Text page via [employer] Paging/Directory         Clinically Significant Risk Factors                        # Overweight: Estimated body mass index is 25.78 kg/m  as calculated from the following:    Height as of this encounter: 1.753 m (5' 9\").    Weight as of this encounter: 79.2 kg (174 lb 9.6 oz).          ______________________________________________________________________    Interval History   No new problems. Confused. Pulls at Tay if able.     Data reviewed today: I reviewed all medications, new labs and imaging results over the last 24 hours. I personally reviewed no images or EKG's today.    Physical Exam   Vital Signs: Temp: 98.2  F (36.8  C) Temp src: Temporal BP: 118/46 " Pulse: 63   Resp: 22 SpO2: 93 % O2 Device: None (Room air)    Weight: 174 lbs 9.6 oz  GENERAL:  Comfortable. Cooperative.  PSYCH: pleasant, oriented, No acute distress.  EYES: PERRLA, Normal conjunctiva.  HEART:  Regular rate and rhythm. No JVD. Pulses normal. No edema.  LUNGS:  Clear to auscultation, normal Respiratory effort.  ABDOMEN:  Soft, no hepatosplenomegaly, normal bowel sounds.  EXTREMETIES: No clubbing, cyanosis or ischemia  SKIN:  Dry to touch, No rash.      Data   Recent Labs   Lab 12/16/22  0728 12/15/22  0822 12/14/22  0617   WBC 15.2* 16.7* 17.7*   HGB 7.5* 7.8* 7.3*   * 105* 104*    331 305   CR  --   --  0.71     No results found for this or any previous visit (from the past 24 hour(s)).

## 2022-12-18 ENCOUNTER — APPOINTMENT (OUTPATIENT)
Dept: CT IMAGING | Facility: CLINIC | Age: 87
DRG: 480 | End: 2022-12-18
Attending: STUDENT IN AN ORGANIZED HEALTH CARE EDUCATION/TRAINING PROGRAM
Payer: COMMERCIAL

## 2022-12-18 LAB
ANION GAP SERPL CALCULATED.3IONS-SCNC: 8 MMOL/L (ref 7–15)
BUN SERPL-MCNC: 30.1 MG/DL (ref 8–23)
CALCIUM SERPL-MCNC: 8.2 MG/DL (ref 8.8–10.2)
CHLORIDE SERPL-SCNC: 114 MMOL/L (ref 98–107)
CREAT SERPL-MCNC: 0.87 MG/DL (ref 0.67–1.17)
DEPRECATED HCO3 PLAS-SCNC: 23 MMOL/L (ref 22–29)
ERYTHROCYTE [DISTWIDTH] IN BLOOD BY AUTOMATED COUNT: 16.5 % (ref 10–15)
GFR SERPL CREATININE-BSD FRML MDRD: 84 ML/MIN/1.73M2
GLUCOSE BLDC GLUCOMTR-MCNC: 105 MG/DL (ref 70–99)
GLUCOSE BLDC GLUCOMTR-MCNC: 110 MG/DL (ref 70–99)
GLUCOSE BLDC GLUCOMTR-MCNC: 111 MG/DL (ref 70–99)
GLUCOSE BLDC GLUCOMTR-MCNC: 149 MG/DL (ref 70–99)
GLUCOSE SERPL-MCNC: 118 MG/DL (ref 70–99)
HCT VFR BLD AUTO: 24.8 % (ref 40–53)
HGB BLD-MCNC: 7.2 G/DL (ref 13.3–17.7)
HGB BLD-MCNC: 7.6 G/DL (ref 13.3–17.7)
HGB BLD-MCNC: 7.7 G/DL (ref 13.3–17.7)
HGB BLD-MCNC: 7.7 G/DL (ref 13.3–17.7)
MCH RBC QN AUTO: 31.3 PG (ref 26.5–33)
MCHC RBC AUTO-ENTMCNC: 31 G/DL (ref 31.5–36.5)
MCV RBC AUTO: 101 FL (ref 78–100)
PLATELET # BLD AUTO: 346 10E3/UL (ref 150–450)
POTASSIUM SERPL-SCNC: 3.9 MMOL/L (ref 3.4–5.3)
RBC # BLD AUTO: 2.46 10E6/UL (ref 4.4–5.9)
SODIUM SERPL-SCNC: 145 MMOL/L (ref 136–145)
UPPER GI ENDOSCOPY: NORMAL
WBC # BLD AUTO: 17.2 10E3/UL (ref 4–11)

## 2022-12-18 PROCEDURE — 0DJ08ZZ INSPECTION OF UPPER INTESTINAL TRACT, VIA NATURAL OR ARTIFICIAL OPENING ENDOSCOPIC: ICD-10-PCS | Performed by: INTERNAL MEDICINE

## 2022-12-18 PROCEDURE — 36415 COLL VENOUS BLD VENIPUNCTURE: CPT | Performed by: INTERNAL MEDICINE

## 2022-12-18 PROCEDURE — 99233 SBSQ HOSP IP/OBS HIGH 50: CPT | Performed by: INTERNAL MEDICINE

## 2022-12-18 PROCEDURE — P9016 RBC LEUKOCYTES REDUCED: HCPCS | Performed by: STUDENT IN AN ORGANIZED HEALTH CARE EDUCATION/TRAINING PROGRAM

## 2022-12-18 PROCEDURE — 85018 HEMOGLOBIN: CPT | Performed by: INTERNAL MEDICINE

## 2022-12-18 PROCEDURE — 85018 HEMOGLOBIN: CPT | Performed by: STUDENT IN AN ORGANIZED HEALTH CARE EDUCATION/TRAINING PROGRAM

## 2022-12-18 PROCEDURE — 250N000013 HC RX MED GY IP 250 OP 250 PS 637: Performed by: STUDENT IN AN ORGANIZED HEALTH CARE EDUCATION/TRAINING PROGRAM

## 2022-12-18 PROCEDURE — 200N000001 HC R&B ICU

## 2022-12-18 PROCEDURE — C9113 INJ PANTOPRAZOLE SODIUM, VIA: HCPCS | Performed by: INTERNAL MEDICINE

## 2022-12-18 PROCEDURE — 80048 BASIC METABOLIC PNL TOTAL CA: CPT | Performed by: INTERNAL MEDICINE

## 2022-12-18 PROCEDURE — 250N000011 HC RX IP 250 OP 636: Performed by: STUDENT IN AN ORGANIZED HEALTH CARE EDUCATION/TRAINING PROGRAM

## 2022-12-18 PROCEDURE — 36415 COLL VENOUS BLD VENIPUNCTURE: CPT | Performed by: STUDENT IN AN ORGANIZED HEALTH CARE EDUCATION/TRAINING PROGRAM

## 2022-12-18 PROCEDURE — 999N000099 HC STATISTIC MODERATE SEDATION < 10 MIN: Performed by: INTERNAL MEDICINE

## 2022-12-18 PROCEDURE — 43235 EGD DIAGNOSTIC BRUSH WASH: CPT | Performed by: INTERNAL MEDICINE

## 2022-12-18 PROCEDURE — 250N000013 HC RX MED GY IP 250 OP 250 PS 637: Performed by: INTERNAL MEDICINE

## 2022-12-18 PROCEDURE — 74174 CTA ABD&PLVS W/CONTRAST: CPT

## 2022-12-18 PROCEDURE — 85027 COMPLETE CBC AUTOMATED: CPT | Performed by: INTERNAL MEDICINE

## 2022-12-18 PROCEDURE — 250N000009 HC RX 250: Performed by: STUDENT IN AN ORGANIZED HEALTH CARE EDUCATION/TRAINING PROGRAM

## 2022-12-18 PROCEDURE — 250N000011 HC RX IP 250 OP 636: Performed by: INTERNAL MEDICINE

## 2022-12-18 RX ORDER — LIDOCAINE 40 MG/G
CREAM TOPICAL
Status: DISCONTINUED | OUTPATIENT
Start: 2022-12-18 | End: 2022-12-18

## 2022-12-18 RX ORDER — IOPAMIDOL 755 MG/ML
500 INJECTION, SOLUTION INTRAVASCULAR ONCE
Status: COMPLETED | OUTPATIENT
Start: 2022-12-18 | End: 2022-12-18

## 2022-12-18 RX ORDER — FLUMAZENIL 0.1 MG/ML
0.2 INJECTION, SOLUTION INTRAVENOUS
Status: DISCONTINUED | OUTPATIENT
Start: 2022-12-18 | End: 2022-12-21 | Stop reason: HOSPADM

## 2022-12-18 RX ADMIN — MAGNESIUM OXIDE TAB 400 MG (241.3 MG ELEMENTAL MG) 400 MG: 400 (241.3 MG) TAB at 08:33

## 2022-12-18 RX ADMIN — SODIUM CHLORIDE 80 ML: 9 INJECTION, SOLUTION INTRAVENOUS at 00:22

## 2022-12-18 RX ADMIN — Medication 1 TABLET: at 08:33

## 2022-12-18 RX ADMIN — ACETAMINOPHEN 975 MG: 325 TABLET, FILM COATED ORAL at 15:19

## 2022-12-18 RX ADMIN — AMOXICILLIN AND CLAVULANATE POTASSIUM 1 TABLET: 875; 125 TABLET, FILM COATED ORAL at 08:33

## 2022-12-18 RX ADMIN — Medication 50 MCG: at 08:33

## 2022-12-18 RX ADMIN — SERTRALINE HYDROCHLORIDE 50 MG: 50 TABLET ORAL at 08:33

## 2022-12-18 RX ADMIN — MICONAZOLE NITRATE: 20 POWDER TOPICAL at 15:18

## 2022-12-18 RX ADMIN — PANTOPRAZOLE SODIUM 40 MG: 40 INJECTION, POWDER, FOR SOLUTION INTRAVENOUS at 12:26

## 2022-12-18 RX ADMIN — Medication 1 MG: at 23:14

## 2022-12-18 RX ADMIN — OXYCODONE HYDROCHLORIDE AND ACETAMINOPHEN 1000 MG: 500 TABLET ORAL at 08:33

## 2022-12-18 RX ADMIN — IOPAMIDOL 80 ML: 755 INJECTION, SOLUTION INTRAVENOUS at 00:22

## 2022-12-18 RX ADMIN — MIDAZOLAM HYDROCHLORIDE 3 MG: 1 INJECTION, SOLUTION INTRAMUSCULAR; INTRAVENOUS at 10:57

## 2022-12-18 RX ADMIN — MICONAZOLE NITRATE: 20 POWDER TOPICAL at 21:00

## 2022-12-18 RX ADMIN — DONEPEZIL HYDROCHLORIDE 5 MG: 5 TABLET ORAL at 23:14

## 2022-12-18 RX ADMIN — TAMSULOSIN HYDROCHLORIDE 0.4 MG: 0.4 CAPSULE ORAL at 08:33

## 2022-12-18 RX ADMIN — AMOXICILLIN AND CLAVULANATE POTASSIUM 1 TABLET: 875; 125 TABLET, FILM COATED ORAL at 20:58

## 2022-12-18 RX ADMIN — ACETAMINOPHEN 975 MG: 325 TABLET, FILM COATED ORAL at 08:33

## 2022-12-18 ASSESSMENT — ACTIVITIES OF DAILY LIVING (ADL)
ADLS_ACUITY_SCORE: 53
ADLS_ACUITY_SCORE: 53
ADLS_ACUITY_SCORE: 51
ADLS_ACUITY_SCORE: 53

## 2022-12-18 NOTE — PROGRESS NOTES
Cross cover note    Patient transferred to ICU.  He had initially lost IV access and the unit of blood could not be infused which is now gone bad.  I did not order for another 2 units of PRBC.  I was also able to reach patient's sister at [phone] and updated her on the patient's condition.    Thanh Stein MD  Internal Medicine Hospitalist  Pager: [phone]

## 2022-12-18 NOTE — PROGRESS NOTES
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 He presented to the [facility] ED on 12/6/2022 after a fall and was found to have right humeral fracture, right hip fracture, and left open thumb DIP joint fracture with dislocation.     ED evaluation showed temperature 97.6  F, heart rate 68, blood pressure 145/59, and oxygen saturation of 97% on room air.  Laboratory evaluation showed WBC 20.3, hemoglobin 12.6, and troponin 26 (31 on repeat). Right shoulder x-ray showed acute, impacted, complex, proximal humerus fracture with surgical neck and tuberosity involvement and surrounding tissue swelling and varus angulation.  Pelvis and right hip x-ray showed acute, displaced, and angulated intertrochanteric proximal femoral fracture; acetabular fracture could not be ruled out.  Right knee x-ray showed subtle dilation and irregularity of tibial plateau, concerning for nondisplaced fracture.  CT of the knee was recommended.  X-ray of the left hand showed left thumb DIP dislocation. CTs of head and C spine were unremarkable. CT of right knee showed no acute fracture but old healed, mildly depressed fracture of the lateral tibial plateau. Scott was admitted for further cares.     Orthopedic surgery took the patient to the operating room on 12/7 where he underwent fixation of the right hip with cephalomedullary device as well as left thumb DIP joint irrigation and excisional debridement. Postoperative course was complicated by progressive anemia, urine retention, and right upper extremity DVT (diagnosed 12/12/22) for which heparin drip was started with transition to Eliquis. Therapies recommended TCU on discharge. TCU  "placement was arranged tentatively for 12/19/22.    On 12/17, the pt passed a large bloody stool.  Prior to that point, the patient's hemoglobin was running in the 7's after drifting down from a preop value of 10.2.  Stat blood draw on the evening of 12/17 showed hemoglobin of 5.7 and GI was consulted.  After transfer to the ICU, the patient was transfused 2 units packed red cells.  CTA showed \"No evidence of ongoing active GI bleeding.  Hiatal hernia with wall thickening of the lower thoracic esophagus...\"    Diagnoses:  1.  Fall resulting in right femur fracture, s/p ORIF on 12/7  -left thumb open fracture/dislocation, s/p irrigation and excisional debridement 12/7  -right proximal humerus fracture, managed conservatively  2.  Gastrointestinal bleed contributing to severe anemia.  Severe esophagitis w distal esophageal ulcer and non-bleeding distal esophageal ulcer identified on EGD.    3.  Acute on chronic anemia.  Preoperative hemoglobin was 10.2 though baseline is probably about 12.  Postoperatively, the patient had significant anemia (ranging from 7-8) despite only 100 cc EBL and further exacerbated by this upper gastrointestinal bleed.  4.  Right upper extremity DVT.  Patient had been started on anticoagulation with apixaban for this issue.  Last dose 12/17 at 9 AM.  5.  Leukocytosis without evidence of infection.  Patient has been treated empirically for HCAP since surgery in the setting of evident bibasilar infiltrates on chest x-ray.  Plan has been for 7 days of antibiotics (Augmentin).  6.  Elevated troponin without ACS.  7.  Cognitive delay complicated by dementia.    -Parkinson's disease with Lewy body dementia  -Phenylketonuria  8.  Severe obstructive sleep apnea.  9.  Generalized anxiety disorder.  10.  Macular degeneration.     PLAN:  1.  IV PPI.   2.  OK to resume anticoagulant on about Tuesday, this week, assuming stable HGB.  3.  Due to the finding of suspected pill esophagitis, the patient needs " "to be encouraged to drink a full glass of water with pills.  4.  We will continue to monitor hemoglobin and transfuse for value less than 7.  Bleeding appears to be stopped at this time but will continue to monitor twice a day unless the bleeding seems to accelerate.       Diet: Snacks/Supplements Adult: Ensure Enlive; Between Meals  NPO for Medical/Clinical Reasons Except for: Meds    DVT Prophylaxis: Pneumatic Compression Devices  Tay Catheter: Not present  Central Lines: None  Cardiac Monitoring: None  Code Status: No CPR- Do NOT Intubate      Disposition Plan      Expected Discharge Date: 12/19/2022,  9:00 AM  Discharge Delays: Placement - TCU  Destination: nursing home  Discharge Comments: [facility].        The patient's care was discussed with the ICU Team.    Arnel Wei MD  Hospitalist Service  [facility]  Securely message with the Vocera Web Console (learn more here)  Text page via [employer] Paging/Directory         Clinically Significant Risk Factors                        # Overweight: Estimated body mass index is 25.78 kg/m  as calculated from the following:    Height as of this encounter: 1.753 m (5' 9\").    Weight as of this encounter: 79.2 kg (174 lb 9.6 oz).          ______________________________________________________________________    Interval History   Chart reviewed, pt interviewed.    Mr. Abbasi was very sleepy following his procedure today.  I spoke with Dani Morel DO from GI before and after the endoscopy.    Data reviewed today: I reviewed all medications, new labs and imaging results over the last 24 hours. I personally reviewed CTA Abdomen and pelvis    Physical Exam   Vital Signs: Temp: 97.4  F (36.3  C) Temp src: Temporal BP: 100/81 Pulse: 72   Resp: (!) 47 SpO2: 97 % O2 Device: None (Room air)    Weight: 174 lbs 9.6 oz  Constitutional: Sleeping calmly.  He opened his eyes to voice but seemed only half awake.  ENT: normocepalic, without obvious " abnormality  Hematologic / Lymphatic: no cervical lymphadenopathy and no supraclavicular lymphadenopathy  Respiratory: no increased work of breathing, good air exchange, no retractions and clear to auscultation, no crackles or wheezing  Cardiovascular: regular rate and rhythm and no murmur noted  GI: normal bowel sounds, soft, non-distended and non-tender  Musculoskeletal: Distal perfusion is intact.  No apparent tenderness over the calves bilaterally.  no lower extremity pitting edema present    Data   Recent Labs   Lab 12/18/22  1311 12/18/22  1142 12/18/22  0850 12/18/22  0423 12/17/22  2247 12/17/22  1927 12/16/22  0728 12/15/22  0822 12/14/22  0617   WBC  --   --   --  17.2*  --   --  15.2* 16.7* 17.7*   HGB  --  7.6*  --  7.7*  7.7*  --  5.7* 7.5* 7.8* 7.3*   MCV  --   --   --  101*  --   --  105* 105* 104*   PLT  --   --   --  346  --   --  333 331 305   NA  --   --   --  145  --   --   --   --   --    POTASSIUM  --   --   --  3.9  --   --   --   --   --    CHLORIDE  --   --   --  114*  --   --   --   --   --    CO2  --   --   --  23  --   --   --   --   --    BUN  --   --   --  30.1*  --   --   --   --   --    CR  --   --   --  0.87  --   --   --   --  0.71   ANIONGAP  --   --   --  8  --   --   --   --   --    FLORENTINO  --   --   --  8.2*  --   --   --   --   --    *  --  111* 118*   < >  --   --   --   --     < > = values in this interval not displayed.     Recent Results (from the past 24 hour(s))   CTA Abdomen Pelvis with Contrast    Narrative    EXAM: CTA ABDOMEN PELVIS WITH CONTRAST  LOCATION: [facility]  DATE/TIME: 12/18/2022 12:21 AM    INDICATION: GI bleed.  COMPARISON: CT from 11/29/2010.  TECHNIQUE: CT angiogram abdomen pelvis during arterial phase of injection of IV contrast. 2D and 3D MIP reconstructions were performed by the CT technologist. Dose reduction techniques were used.  CONTRAST: 80mL Isovue 370    FINDINGS:  ANGIOGRAM ABDOMEN/PELVIS: Scattered aortic  atherosclerotic calcification without aneurysm. On the arterial phase images, the aorta and its abdominal branches vessels are patent. There are areas of intraluminal hyperdensity layering dependently within the   large bowel on the noncontrast images. There is no evidence of intraluminal arterial phase contrast extravasation. There is no progressive pooling of contrast within the GI tract on the venous phase images.    LOWER CHEST: Bilateral pleural effusions with adjacent passive atelectasis. Small hiatal hernia with wall thickening of the lower thoracic esophagus.    HEPATOBILIARY: Normal gallbladder. Benign cyst in the right liver lobe requiring no follow-up.    PANCREAS: Normal.    SPLEEN: Normal.    ADRENAL GLANDS: Normal.    KIDNEYS/BLADDER: Bilateral renal parapelvic and cortical cysts requiring no follow-up. No hydronephrosis. Bladder wall is thickened. Prominent left lateral bladder wall diverticulum containing calcific stone debris. Small foci of nondependent gas in the   bladder.    BOWEL: Diffusely fluid-filled large and small bowel throughout the abdomen. No free.    LYMPH NODES: Normal.    PELVIC ORGANS: Radiotherapy seeds in the prostate gland.    MUSCULOSKELETAL: Status post internal fixation of a comminuted right hip fracture. There is also a comminuted though nondisplaced fracture through the acetabular roof, appearance similar to a right hip MRI from 09/19/2022.      Impression    IMPRESSION:  1.  No CT evidence of ongoing active GI bleed.    2.  Hiatal hernia with wall thickening of the lower thoracic esophagus which may relate to reflux.    3.  Fluid content throughout the GI tract which can be seen in gastroenteritis.    4.  Bilateral pleural effusions with adjacent passive atelectasis.

## 2022-12-18 NOTE — CONSULTS
[facility] - Digestive Health Consultation     Scott Abbasi  [address] [place] [address]  87 year old male     Admission Date/Time: 12/6/2022  Primary Care Provider: [facility]  Referring / Attending Physician:  Ottoniel     We were asked to see the patient in consultation by Dr. Alcazar for evaluation of hematochezia.    ASSESSMENT:    Hematochezia in the setting of anticoagulation which has since been stopped  Acute GI blood loss on chronic macrocytic anemia  Upper extremity DVT requiring further anticoagulation    Differential diagnosis would include and likely favor diverticular bleed, less likely hemorrhoidal bleed or neoplasm.  This does not seem to be consistent with upper GI bleeding.    RECOMMENDATIONS:  Agree with holding anticoagulation for now   We will proceed to flexible sigmoidoscopy  Low threshold to restart reversible anticoagulation such as heparin pending findings on flexible sigmoidoscopy.  Follow hemoglobin, replace to goal of 8.    ADDENDUM:  After enema for flex sig, passed large melenic stool.  Thus, called guardian again, Irina ([phone]) recommended we proceed with EGD, then perhaps flex sig.  She was amenable and obtained phone consent discusion with assistance of endo RN, Sujatha.  Will updated Irina following these procedures.     Thank you for involving us in this patient's care.  Please call me with any questions.    Total time spent in chart review, direct medical discussion, examination, and documentation was 45 minutes    Dani Morel DO   [facility]  Cell [phone]    ________________________________________________________________________        CC: Fall     HPI:  Scott Abbasi is a 87 year old male h/o Parkinson's/dementia, PKU with cognitive impairment, who we are asked to see for hematochezia.  The patient has had a prolonged hospitalization, initially admitted on the sixth after a fall and multiple orthopedic  fractures.  He developed hematochezia suddenly yesterday associated with approximately 2 g hemoglobin drop.  He passed a few more bloody stools overnight, and CTA was ordered, not revealing any active bleeding or significant other GI abnormality.    Presently, the patient denies significant abdominal pain.  Spoke with the patient and his guardian today regarding bleeding and need for endoscopic evaluation to which they were amenable.     ROS: A comprehensive ten point review of systems was negative aside from those in mentioned in the HPI.      PAST MEDICAL HISTORY:  Patient Active Problem List    Diagnosis Date Noted     Elevated troponin 12/06/2022     Priority: Medium     Fall, initial encounter 12/06/2022     Priority: Medium     Thumb laceration, left, initial encounter 12/06/2022     Priority: Medium     Closed dislocation of interphalangeal joint of left thumb, initial encounter 12/06/2022     Priority: Medium     Closed fracture of proximal end of right humerus, unspecified fracture morphology, initial encounter 12/06/2022     Priority: Medium     Displaced intertrochanteric fracture of left femur, initial encounter for closed fracture (H) 12/06/2022     Priority: Medium     SOCIAL HISTORY:     FAMILY HISTORY:  History reviewed. No pertinent family history.  ALLERGIES: No Known Allergies  MEDICATIONS:   Current Facility-Administered Medications   Medication     acetaminophen (TYLENOL) tablet 975 mg     amoxicillin-clavulanate (AUGMENTIN) 875-125 MG per tablet 1 tablet     benzocaine-menthol (CHLORASEPTIC) 6-10 MG lozenge 1 lozenge     bisacodyl (DULCOLAX) suppository 10 mg     calcium carbonate-vitamin D (OSCAL) 500-5 MG-MCG per tablet 1 tablet     donepezil (ARICEPT) tablet 5 mg     HYDROmorphone (DILAUDID) half-tab 1-2 mg     lactated ringers infusion     lidocaine (LMX4) cream     lidocaine (LMX4) kit     lidocaine 1 % 0.1-1 mL     lidocaine 1 % 0.1-1 mL     magnesium hydroxide (MILK OF MAGNESIA)  "suspension 30 mL     magnesium oxide (MAG-OX) tablet 400 mg     melatonin tablet 1 mg     miconazole (MICATIN) 2 % powder     midazolam (VERSED) injection 3 mg     naloxone (NARCAN) injection 0.2 mg    Or     naloxone (NARCAN) injection 0.4 mg    Or     naloxone (NARCAN) injection 0.2 mg    Or     naloxone (NARCAN) injection 0.4 mg     ondansetron (ZOFRAN ODT) ODT tab 4 mg    Or     ondansetron (ZOFRAN) injection 4 mg     polyethylene glycol (MIRALAX) Packet 17 g     prochlorperazine (COMPAZINE) injection 5 mg    Or     prochlorperazine (COMPAZINE) tablet 5 mg    Or     prochlorperazine (COMPAZINE) suppository 12.5 mg     senna-docusate (SENOKOT-S/PERICOLACE) 8.6-50 MG per tablet 1 tablet    Or     senna-docusate (SENOKOT-S/PERICOLACE) 8.6-50 MG per tablet 2 tablet     sertraline (ZOLOFT) tablet 50 mg     sodium chloride (PF) 0.9% PF flush 3 mL     sodium chloride (PF) 0.9% PF flush 3 mL     sodium chloride (PF) 0.9% PF flush 3 mL     sodium chloride (PF) 0.9% PF flush 3 mL     sodium phosphate (FLEET ENEMA) 1 enema     tamsulosin (FLOMAX) capsule 0.4 mg     vitamin C (ASCORBIC ACID) tablet 1,000 mg     Vitamin D3 (CHOLECALCIFEROL) tablet 50 mcg     PHYSICAL EXAM:   BP (!) 158/114 (BP Location: Left arm, Cuff Size: Adult Regular)   Pulse 74   Temp 99  F (37.2  C) (Axillary)   Resp 13   Ht 1.753 m (5' 9\")   Wt 79.2 kg (174 lb 9.6 oz)   SpO2 96%   BMI 25.78 kg/m     GEN: Communicative and in NAD.  MARY KATE: AT, anicteric, OP without erythema, exudate, or ulcers.    NECK: Supple.    LYMPH: No LAD noted.  HRT: Reg  LUNGS: CTA  ABD: ND, +BS, no guarding or pain to palpation, no rebound, no HSM.  SKIN: No rash, jaundice or spider angiomata     ADDITIONAL DATA:   I reviewed the patient's new clinical lab test results.   Recent Labs   Lab Test 12/18/22  2965 12/17/22  1927 12/16/22  0728 12/15/22  0822   WBC 17.2*  --  15.2* 16.7*   HGB 7.7*  7.7* 5.7* 7.5* 7.8*   *  --  105* 105*     --  333 331     Recent " Labs   Lab Test 12/18/22  0423 12/09/22  0740 12/08/22  0748   POTASSIUM 3.9 4.5 4.2   CHLORIDE 114* 104 103   CO2 23 25 24   BUN 30.1* 21.5 23.0   ANIONGAP 8 9 8     Recent Labs   Lab Test 12/06/22  1616   PROTEIN 30*        I reviewed the patient's new imaging results.

## 2022-12-18 NOTE — PLAN OF CARE
Goal Outcome Evaluation:     ICU End of Shift Summary.  For vital signs and complete assessments, please see documentation flowsheets.      Pertinent assessments: pt alert to self, mentally delayed. Cries out at times and very restless. Is redirectable. Afebrile. SR in the 60s. Still having loose stools black/dark brown, though less after pt was scoped the AM. Ext cath w/ good output. PIV w/ LR 75/hr. Intermittent pain, gets scheduled tylenol and has dilaudid prn. Scattered bruising on R side, incisions on hip/upper thigh see flowsheets. Guardian updated.  Major Shift Events: EGD done this AM, no active bleeding currently but severe esophagitis and noted ulcer possibly pill induced, Low fiber diet ordered, take pills with full glass of water. See GI note. Dr. Wei OK with hgb >7. Starting protonix BID.   Plan (Upcoming Events): Monitor hgb and continue with ICU cares  Discharge/Transfer Needs: TCU at discharge      Bedside Shift Report Completed : y  Bedside Safety Check Completed:   y

## 2022-12-18 NOTE — PLAN OF CARE
Goal Outcome Evaluation:    Patient's blood administration stopped due to IV going bad. Tried starting a new IV x2 unable to will call flyer for help.

## 2022-12-18 NOTE — PLAN OF CARE
Goal Outcome Evaluation:    Patient transferred to ICU on his bed. Report was given to nurse taking over his  care . All personal belongings taken with patient.

## 2022-12-18 NOTE — PLAN OF CARE
ICU End of Shift Summary.  For vital signs and complete assessments, please see documentation flowsheets.      Pertinent assessments: Patient transferred to ICU. Alert only to self, baseline for patient. LS clear. VSS. Large maroon/bloody stools. Voiding via external cath. R hip site bruised, along with R arm/shoulder & R abdomen. Patient very restless at times - restraint placed on L wrist only to ensure IV patency during blood administration. Pain managed with scheduled & PRN medications.   Major Shift Events: L wrist restraint placed, large maroon stools, 2u PRBC.  Plan (Upcoming Events): GI consult  Discharge/Transfer Needs: Anticipate transfer to floor once stable.      Bedside Shift Report Completed: Y  Bedside Safety Check Completed: Y    Left wrist restraints initiated on patient on 12/17/2022 at 11:35 PM    Clinical Justification: Pulling lines, pulling tubes, and pulling equipment  Less Restrictive Alternative: Re-evaluate equipment  Attending Physician Notified: Yes, Attending Physician's Name: Dr. Geoffrey Baptiste   Order received: Yes         Criteria explained to Patient  Patient's Response: Needs reinforcement  Restraint care Plan initiated: Yes    LINSEY VALENCIA RN

## 2022-12-18 NOTE — PROGRESS NOTES
GI  EGD for large melenic stool with flex sig prep - initial bleed c/w hematochezia    Findings included severe esophagitis in the distal 10cm of the esophagus, adherent clot on fibrotic based ulcer (suspicous for pill induced ulcer), with large amount of hematin remainint in the stomach, several diminutive erosions in the duodenum with poorly visualized DU, without obvious stigmata at the apex of the duodenal bulb.  After the procedure, I called the guardian once again and reported these findings, suggested we likely had a etiology for bleeding and flex sig could be deferred.  She agreed.       Recs:  No anticoagulation for at least 24-48hrs  IV PPI BID  HOB> 45degrees  Goal Hgb >8   Will follow.      Dr. Wei updated.     Dani Morel DO   [facility] - [facility]ive [facility]  Cell [phone]

## 2022-12-18 NOTE — PROGRESS NOTES
Spoke with Lorraine Valverde, pt . Updated them on pt current hemoglobin, doctor doing emergency transfusion, and current status of being transferred to the ICU. Manager said she will try to call guardians as well.     ICU number and information given.

## 2022-12-18 NOTE — PLAN OF CARE
Goal Outcome Evaluation:    DATE:  12/17/2022   TIME OF RECEIPT FROM LAB:  2010  LAB TEST:   hemoglobin  LAB VALUE:   5.7  RESULTS GIVEN WITH READ-BACK TO (PROVIDER):  Thanh Stein MD   TIME LAB VALUE REPORTED TO PROVIDER:   2017

## 2022-12-19 LAB
HGB BLD-MCNC: 7 G/DL (ref 13.3–17.7)
HGB BLD-MCNC: 7.2 G/DL (ref 13.3–17.7)
HOLD SPECIMEN: NORMAL

## 2022-12-19 PROCEDURE — 250N000011 HC RX IP 250 OP 636: Performed by: STUDENT IN AN ORGANIZED HEALTH CARE EDUCATION/TRAINING PROGRAM

## 2022-12-19 PROCEDURE — P9016 RBC LEUKOCYTES REDUCED: HCPCS | Performed by: STUDENT IN AN ORGANIZED HEALTH CARE EDUCATION/TRAINING PROGRAM

## 2022-12-19 PROCEDURE — 250N000013 HC RX MED GY IP 250 OP 250 PS 637: Performed by: STUDENT IN AN ORGANIZED HEALTH CARE EDUCATION/TRAINING PROGRAM

## 2022-12-19 PROCEDURE — 120N000001 HC R&B MED SURG/OB

## 2022-12-19 PROCEDURE — C9113 INJ PANTOPRAZOLE SODIUM, VIA: HCPCS | Performed by: INTERNAL MEDICINE

## 2022-12-19 PROCEDURE — 250N000013 HC RX MED GY IP 250 OP 250 PS 637: Performed by: PHYSICIAN ASSISTANT

## 2022-12-19 PROCEDURE — 85018 HEMOGLOBIN: CPT | Performed by: INTERNAL MEDICINE

## 2022-12-19 PROCEDURE — 36415 COLL VENOUS BLD VENIPUNCTURE: CPT | Performed by: INTERNAL MEDICINE

## 2022-12-19 PROCEDURE — 99233 SBSQ HOSP IP/OBS HIGH 50: CPT | Performed by: INTERNAL MEDICINE

## 2022-12-19 PROCEDURE — 250N000013 HC RX MED GY IP 250 OP 250 PS 637: Performed by: INTERNAL MEDICINE

## 2022-12-19 PROCEDURE — 250N000011 HC RX IP 250 OP 636: Performed by: INTERNAL MEDICINE

## 2022-12-19 RX ADMIN — Medication 1 MG: at 22:52

## 2022-12-19 RX ADMIN — AMOXICILLIN AND CLAVULANATE POTASSIUM 1 TABLET: 875; 125 TABLET, FILM COATED ORAL at 07:56

## 2022-12-19 RX ADMIN — MAGNESIUM OXIDE TAB 400 MG (241.3 MG ELEMENTAL MG) 400 MG: 400 (241.3 MG) TAB at 08:02

## 2022-12-19 RX ADMIN — ACETAMINOPHEN 975 MG: 325 TABLET, FILM COATED ORAL at 18:07

## 2022-12-19 RX ADMIN — MICONAZOLE NITRATE: 20 POWDER TOPICAL at 11:41

## 2022-12-19 RX ADMIN — TAMSULOSIN HYDROCHLORIDE 0.4 MG: 0.4 CAPSULE ORAL at 07:55

## 2022-12-19 RX ADMIN — DONEPEZIL HYDROCHLORIDE 5 MG: 5 TABLET ORAL at 21:52

## 2022-12-19 RX ADMIN — MICONAZOLE NITRATE: 20 POWDER TOPICAL at 21:53

## 2022-12-19 RX ADMIN — PANTOPRAZOLE SODIUM 40 MG: 40 INJECTION, POWDER, FOR SOLUTION INTRAVENOUS at 07:54

## 2022-12-19 RX ADMIN — HYDROMORPHONE HYDROCHLORIDE 1 MG: 2 TABLET ORAL at 18:07

## 2022-12-19 RX ADMIN — ONDANSETRON 4 MG: 2 INJECTION INTRAMUSCULAR; INTRAVENOUS at 19:02

## 2022-12-19 RX ADMIN — AMOXICILLIN AND CLAVULANATE POTASSIUM 1 TABLET: 875; 125 TABLET, FILM COATED ORAL at 21:52

## 2022-12-19 RX ADMIN — ACETAMINOPHEN 975 MG: 325 TABLET, FILM COATED ORAL at 07:54

## 2022-12-19 RX ADMIN — OXYCODONE HYDROCHLORIDE AND ACETAMINOPHEN 1000 MG: 500 TABLET ORAL at 07:54

## 2022-12-19 RX ADMIN — ACETAMINOPHEN 975 MG: 325 TABLET, FILM COATED ORAL at 23:36

## 2022-12-19 RX ADMIN — Medication 50 MCG: at 07:56

## 2022-12-19 RX ADMIN — HYDROMORPHONE HYDROCHLORIDE 1 MG: 2 TABLET ORAL at 22:52

## 2022-12-19 RX ADMIN — SERTRALINE HYDROCHLORIDE 50 MG: 50 TABLET ORAL at 07:56

## 2022-12-19 RX ADMIN — Medication 1 TABLET: at 07:56

## 2022-12-19 RX ADMIN — ACETAMINOPHEN 975 MG: 325 TABLET, FILM COATED ORAL at 01:48

## 2022-12-19 RX ADMIN — HYDROMORPHONE HYDROCHLORIDE 2 MG: 2 TABLET ORAL at 06:14

## 2022-12-19 ASSESSMENT — ACTIVITIES OF DAILY LIVING (ADL)
ADLS_ACUITY_SCORE: 53
ADLS_ACUITY_SCORE: 57
ADLS_ACUITY_SCORE: 57
ADLS_ACUITY_SCORE: 53
ADLS_ACUITY_SCORE: 57
ADLS_ACUITY_SCORE: 53

## 2022-12-19 NOTE — PROGRESS NOTES
Left wrist restraint discontinued at 9:24 PM on 12/18/2022.    Restraint discontinue criteria met, patient is calm, cooperative and safe. Restraints removed. IV fluids discontinued per order. Mitts placed.      Patient's Response: Needs reinforcement     Attending Physician Notified: Yes, Attending Physician's Name: Geoffrey VALENCIA RN

## 2022-12-19 NOTE — PLAN OF CARE
Goal Outcome Evaluation:      Plan of Care Reviewed With: patient    Overall Patient Progress: improvingOverall Patient Progress: improving     Pt came to room 624 from the ICU around 0030. Pt was asleep and no visible signs of pain. Belongings and CPAP in pts room.  Pt is disoriented x3, cries out often when awake. Ax2 with cares/lift. VSS. Room air. Pt can take small pills one at a time. Pain managed with debra tylenol and oral dilaudid. Sling in place on R arm. Dressing CDI. External catheter in place. Pt had 1 watery black stool this shift. Discharge pending.     Hgb: 7.2

## 2022-12-19 NOTE — PROGRESS NOTES
Care Management Follow Up    Length of Stay (days): 13    Expected Discharge Date: 12/20/2022     Concerns to be Addressed:       Patient plan of care discussed at interdisciplinary rounds: Yes    Anticipated Discharge Disposition: Home     Anticipated Discharge Services:    Anticipated Discharge DME:      Patient/family educated on Medicare website which has current facility and service quality ratings:    Education Provided on the Discharge Plan:    Patient/Family in Agreement with the Plan:      Referrals Placed by CM/SW:    Private pay costs discussed: Not applicable    Additional Information:    Pt likely not medically stable today. Spoke with [facility] who can accept pt tomorrow.     Left VM for pt IFEOMA Lopes (P: [phone]) to ensure OBRA is completed.     CHARAN Akbar, [facility]  Inpatient Care Coordination  Ortho/Spine Unit  [phone]  Cheryl High, SW

## 2022-12-19 NOTE — PROGRESS NOTES
"[facility]  [facility]  Gastroenterology Progress note    Interval History:      Patient with ongoing dark stools.  No jackeline blood.  Hemoglobin stable.      Vital Signs:      BP (!) 144/49 (BP Location: Left arm, Patient Position: Supine)   Pulse 68   Temp 97.4  F (36.3  C) (Temporal)   Resp 20   Ht 1.753 m (5' 9\")   Wt 79.2 kg (174 lb 9.6 oz)   SpO2 90%   BMI 25.78 kg/m    Temp (24hrs), Av.8  F (36.6  C), Min:97.4  F (36.3  C), Max:98.2  F (36.8  C)    No data found.    Intake/Output Summary (Last 24 hours) at 2022 1244  Last data filed at 2022 0859  Gross per 24 hour   Intake 1676.25 ml   Output 560 ml   Net 1116.25 ml         Constitutional: NAD, comfortable  Cardiovascular: RRR, normal S1, S2   Respiratory: CTAB  Abdomen: soft, non-tender, nondistended    Additional Comments:  ROS, FH, SH: See initial GI consult for details.    Laboratory Data:  Recent Labs   Lab Test 22  0702 22  1752 22  1142 22  0423 22  1927 22  0728 12/15/22  0822   WBC  --   --   --  17.2*  --  15.2* 16.7*   HGB 7.2* 7.2* 7.6* 7.7*  7.7*   < > 7.5* 7.8*   MCV  --   --   --  101*  --  105* 105*   PLT  --   --   --  346  --  333 331    < > = values in this interval not displayed.     Recent Labs   Lab Test 22  0423 22  0617 22  0740 22  0748     --  138 135*   POTASSIUM 3.9  --  4.5 4.2   CHLORIDE 114*  --  104 103   CO2 23  --  25 24   BUN 30.1*  --  21.5 23.0   CR 0.87 0.71 0.80 0.86   ANIONGAP 8  --  9 8   FLORENTINO 8.2*  --  8.5* 8.4*     Recent Labs   Lab Test 22  1616   PROTEIN 30*         Assessment:  88 yo male with history of Parkinson's disease, dementia, PKU with cognitive impairment who presents with hematochezia.  The patient has had a prolonged hospitalization secondary to a fall and multiple orthopedic fractures.  He developed hematochezia  with a 2 g hemoglobin drop.  CTA did not show any active " bleeding or other GI abnormality.  No abdominal pain.    EGD 12/18 with severe esophagitis in the distal 10 cm of the esophagus, adherent clot on fibrotic based ulcer (? Pill induced ulcer) with large amount of hemetin in the stomach, several small erosions in the duodenum with poorly visualized duodenal ulcer without obvious stigmata.  Flex sig deferred due to findings.  Watery black stool during last shift.  Hemoglobin stable at 7.2.  Plan:  -  OK to restart anticoagulation if no further signs of bleeding.  -  IV PPI BID.  -  Monitor H/H; transfuse prn.    Total Time Spent:  10  minutes    AGUSTIN Nunn  [facility]  Office:  [phone] call if needed after 5PM  Cell:  [phone], not available after 5PM at this number

## 2022-12-19 NOTE — PROVIDER NOTIFICATION
Paged Dr. Morton at [phone]:  2 things.  FYI that pt has a bladder scan of 539ml after voiding only 300ml this morning.  Please advise.  Second, pt's niece Irina would like phone update - her # is on provider sticky note.  Thank you

## 2022-12-19 NOTE — PLAN OF CARE
Day RN ([phone])    Patient vital signs are at baseline: Yes  Patient able to ambulate as they were prior to admission or with assist devices provided by therapies during their stay:  No,  Reason:  Very minimally able to cooperate with directions this shift, just repositioning in bed with lift and assist.  Patient MUST void prior to discharge:  No,  Reason:  Voiding small amount but with significant retention.  Per Dr. Morton - bladder scan q shift and straight cath any time over 300ml.  Patient able to tolerate oral intake:  Yes  Pain has adequate pain control using Oral analgesics:  Yes  Does patient have an identified :  Yes  Has goal D/C date and time been discussed with patient:  Yes    Pt A/O x self, very confused and does not often follow commands.  Cries and yells out when awake and not being directly interacted with - easy to soothe once at bedside and talking to him.  Per pt family, dilaudid makes him more restless/agitated at times so attempting to minimize giving this medication though difficult to assess the extent of his pain.  Gave 1mg PO dilaudid x1.  VSS and afebrile.  Pain managed adequately with scheduled PO tylenol.  CMS seems intact - mild swelling to R arm/hip.  Dressing CDI R hip - sutures to underside of L thumb.  Very bruised throughout body.  Not oob this shift as noted above.  Voided a small amount but need to be straight cath'd at around 1450 for 550ml.  Tolerating low fiber diet well.  Morning hemoglobin was 7.2, 1800 check was 7.0 and passed along to night RN that will need a transfusion.  Pt had two loose stools that were black/dark in color.  Plan is return to group home on discharge.  Will continue to monitor.

## 2022-12-19 NOTE — PROGRESS NOTES
ICU End of Shift Summary.  For vital signs and complete assessments, please see documentation flowsheets.      Pertinent assessments: No acute changes this shift. Decrease in number of stools this shift, still dark/tarry in color. L wrist restraint moved as fluids discontinued - mitts in place. PRN melatonin given x1 to promote adequate rest.   Major Shift Events: Transferred to Ortho floor. Report given to bedside RN prior to transfer.   Plan (Upcoming Events): TBD  Discharge/Transfer Needs: TBD     Bedside Shift Report Completed : Y  Bedside Safety Check Completed: Y

## 2022-12-19 NOTE — PLAN OF CARE
Occupational Therapy: Orders received. Chart reviewed and discussed with care team.? Occupational Therapy not indicated due to pt requiring Ax2 and plans to discharge to TCU.? Defer OT to next level of care. Will complete orders.

## 2022-12-19 NOTE — PROGRESS NOTES
[facility]    Hospitalist Progress Note  Name: Scott Abbasi    MRN: [id number]  Provider: Ila Morton MD  Date of Service: 12/19/2022    Assessment & Plan   Summary of Stay: Scott Abbasi is a 87 year old male who was admitted on 12/6/2022 status post fall and was found to have right humeral fracture, right hip fracture, and left open thumb DIP joint fracture with dislocation.    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  Pelvis and right hip x-ray showed acute, displaced, and angulated intertrochanteric proximal femoral fracture; acetabular fracture could not be ruled out.  Right knee x-ray showed subtle dilation and irregularity of tibial plateau, concerning for nondisplaced fracture.  CT of the knee was recommended.  X-ray of the left hand showed left thumb DIP dislocation. CTs of head and C spine were unremarkable. CT of right knee showed no acute fracture but old healed, mildly depressed fracture of the lateral tibial plateau. Scott was admitted for further cares.     Orthopedic surgery took the patient to the operating room on 12/7 where he underwent fixation of the right hip with cephalomedullary device as well as left thumb DIP joint irrigation and excisional debridement. Postoperative course was complicated by progressive anemia, urine retention, and right upper extremity DVT (diagnosed 12/12/22) for which heparin drip was started with transition to Eliquis. Therapies recommended TCU on discharge.    On 12/17, the pt passed a large bloody stool.  Prior to that point, the patient's hemoglobin was running in the 7's after drifting  "down from a preop value of 10.2.  Stat blood draw on the evening of 12/17 showed hemoglobin of 5.7 and GI was consulted.  After transfer to the ICU, the patient was transfused 2 units packed red cells.  CTA showed \"No evidence of ongoing active GI bleeding.  Hiatal hernia with wall thickening of the lower thoracic esophagus...\" He continues to have darker colored stools hemoglobin is 7.2, GI is following.      Fall resulting in right femur fracture, s/p ORIF on 12/7  -left thumb open fracture/dislocation, s/p irrigation and excisional debridement 12/7  -right proximal humerus fracture, managed conservatively    Gastrointestinal bleed contributing to severe anemia.  Severe esophagitis w distal esophageal ulcer and non-bleeding distal esophageal ulcer identified on EGD.    -Trend hemoglobin.  Went down to 7.2      Acute on chronic anemia.   - Preoperative hemoglobin was 10.2 though baseline is probably about 12.  Postoperatively, the patient had significant anemia (ranging from 7-8) despite only 100 cc EBL and further exacerbated by this upper gastrointestinal bleed.  -Trend hemoglobin  -Continue PPI      Right upper extremity DVT.    -Patient had been started on anticoagulation with apixaban for this issue.  Last dose 12/17 at 9 AM.  -Anticoagulation held due to acute bleeding can resume if hemoglobin remained stable      Leukocytosis without evidence of infection.    -Patient has been treated empirically for HCAP since surgery in the setting of evident bibasilar infiltrates on chest x-ray.  Plan has been for 7 days of antibiotics (Augmentin).    Elevated troponin without ACS.  -Likely demand ischemia    Cognitive delay complicated by dementia.    -Parkinson's disease with Lewy body dementia  -Phenylketonuria  -Supportive care    Severe obstructive sleep apnea.  -CPAP with home setting     Generalized anxiety disorder.  -Supportive care requiring sitter today      Macular degeneration.     Urinary " Retention  -straight cath  -check UA      DVT Prophylaxis: DOAC held for now due to bleeding  Code Status: No CPR- Do NOT Intubate    Disposition: Expected discharge to TCU in 1 to 2 days if hemoglobin remained stable    Interval History   Assumed care reviewed chart.  Patient unable to give any meaningful history.  Patient seemed restless and had a sling on his right arm.  Has a sitter at bedside.  Unable to get more than 10 point review of systems.    Care plan d/w patient's niece who is his guardian too  -Data reviewed today: I reviewed all new labs and imaging reports over the last 24 hours. I personally reviewed no images or EKG's today.    Physical Exam   Temp: 97.4  F (36.3  C) Temp src: Temporal BP: (!) 144/49 Pulse: 68   Resp: 20 SpO2: 90 % O2 Device: None (Room air)    Vitals:    12/06/22 0748 12/13/22 1522   Weight: 79.5 kg (175 lb 3.2 oz) 79.2 kg (174 lb 9.6 oz)     Vital Signs with Ranges  Temp:  [97.4  F (36.3  C)-98.2  F (36.8  C)] 97.4  F (36.3  C)  Pulse:  [61-70] 68  Resp:  [14-32] 20  BP: (108-147)/(41-97) 144/49  SpO2:  [87 %-100 %] 90 %  I/O last 3 completed shifts:  In: 2036.25 [P.O.:740; I.V.:1296.25]  Out: 1060 [Urine:1060]    Constitutional: Sleeping calmly.  He opened his eyes to voice but seemed only half awake.  ENT: normocepalic, without obvious abnormality  Hematologic / Lymphatic: no cervical lymphadenopathy and no supraclavicular lymphadenopathy  Respiratory: no increased work of breathing, good air exchange, no retractions and clear to auscultation, no crackles or wheezing  Cardiovascular: regular rate and rhythm and no murmur noted  GI: normal bowel sounds, soft, non-distended and non-tender  Musculoskeletal: Distal perfusion is intact.  No apparent tenderness over the calves bilaterally.  no lower extremity pitting edema present    Medications     lactated ringers Stopped (12/18/22 2117)       acetaminophen  975 mg Oral Q8H     amoxicillin-clavulanate  1 tablet Oral Q12H SAMANTHA  (08/20)     calcium carbonate-vitamin D  1 tablet Oral Daily     donepezil  5 mg Oral At Bedtime     magnesium oxide  400 mg Oral Daily     miconazole   Topical BID     pantoprazole  40 mg Intravenous QAM AC     sertraline  50 mg Oral Daily     sodium chloride (PF)  3 mL Intracatheter Q8H     sodium chloride (PF)  3 mL Intracatheter Q8H     tamsulosin  0.4 mg Oral Daily     vitamin C  1,000 mg Oral Daily     cholecalciferol  50 mcg Oral Daily     Data     Recent Labs   Lab 12/19/22  0702 12/18/22  1752 12/18/22  1142 12/18/22  0423 12/17/22  1927 12/16/22  0728 12/15/22  0822   WBC  --   --   --  17.2*  --  15.2* 16.7*   HGB 7.2* 7.2* 7.6* 7.7*  7.7*   < > 7.5* 7.8*   HCT  --   --   --  24.8*  --  23.6* 24.5*   MCV  --   --   --  101*  --  105* 105*   PLT  --   --   --  346  --  333 331    < > = values in this interval not displayed.     Recent Labs   Lab 12/18/22 1935 12/18/22  1705 12/18/22  1311 12/18/22  0850 12/18/22  0423 12/17/22  2247 12/14/22  0617   NA  --   --   --   --  145  --   --    POTASSIUM  --   --   --   --  3.9  --   --    CHLORIDE  --   --   --   --  114*  --   --    CO2  --   --   --   --  23  --   --    ANIONGAP  --   --   --   --  8  --   --    * 149* 105*   < > 118*   < >  --    BUN  --   --   --   --  30.1*  --   --    CR  --   --   --   --  0.87  --  0.71   GFRESTIMATED  --   --   --   --  84  --  89   FLORENTINO  --   --   --   --  8.2*  --   --     < > = values in this interval not displayed.     7-Day Micro Results     No results found for the last 168 hours.        Recent Labs   Lab 12/18/22 1935 12/18/22  1705 12/18/22  1311 12/18/22  0850 12/18/22  0423 12/17/22  2247 12/14/22  0617   NA  --   --   --   --  145  --   --    POTASSIUM  --   --   --   --  3.9  --   --    CHLORIDE  --   --   --   --  114*  --   --    CO2  --   --   --   --  23  --   --    ANIONGAP  --   --   --   --  8  --   --    * 149* 105*   < > 118*   < >  --    BUN  --   --   --   --  30.1*  --   --    CR   --   --   --   --  0.87  --  0.71   GFRESTIMATED  --   --   --   --  84  --  89   FLORENTINO  --   --   --   --  8.2*  --   --     < > = values in this interval not displayed.     No results for input(s): NTBNPI, NTBNP in the last 168 hours.  Recent Labs   Lab 12/18/22  1935 12/18/22  1705 12/18/22  1311 12/18/22  0850 12/18/22  0423   * 149* 105* 111* 118*     Recent Labs   Lab 12/19/22  0702 12/18/22  1752 12/18/22  1142   HGB 7.2* 7.2* 7.6*     No results for input(s): AST, ALT, GGT, ALKPHOS, BILITOTAL, BILICONJ, BILIDIRECT, DAMEON in the last 168 hours.    Invalid input(s): BILIRUBININDIRECT  No results for input(s): INR in the last 168 hours.  No results for input(s): LACT in the last 168 hours.  No results for input(s): LIPASE in the last 168 hours.  Recent Labs   Lab 12/18/22  0423 12/14/22  0617   BUN 30.1*  --    CR 0.87 0.71     No results for input(s): TSH in the last 168 hours.  No results for input(s): TROPONIN, TROPI, TROPR, TROPONINIS in the last 168 hours.    Invalid input(s): TROPT, TROP, TROPONINIES, TNIH  No results for input(s): COLOR, APPEARANCE, URINEGLC, URINEBILI, URINEKETONE, SG, UBLD, URINEPH, PROTEIN, UROBILINOGEN, NITRITE, LEUKEST, RBCU, WBCU in the last 168 hours.    No results found for this or any previous visit (from the past 24 hour(s)).

## 2022-12-20 ENCOUNTER — APPOINTMENT (OUTPATIENT)
Dept: PHYSICAL THERAPY | Facility: CLINIC | Age: 87
DRG: 480 | End: 2022-12-20
Payer: COMMERCIAL

## 2022-12-20 ENCOUNTER — APPOINTMENT (OUTPATIENT)
Dept: GENERAL RADIOLOGY | Facility: CLINIC | Age: 87
DRG: 480 | End: 2022-12-20
Attending: PHYSICIAN ASSISTANT
Payer: COMMERCIAL

## 2022-12-20 LAB
ALBUMIN UR-MCNC: 20 MG/DL
AMORPH CRY #/AREA URNS HPF: ABNORMAL /HPF
ANION GAP SERPL CALCULATED.3IONS-SCNC: 8 MMOL/L (ref 7–15)
APPEARANCE UR: CLEAR
BACTERIA #/AREA URNS HPF: ABNORMAL /HPF
BILIRUB UR QL STRIP: NEGATIVE
BUN SERPL-MCNC: 15 MG/DL (ref 8–23)
CALCIUM SERPL-MCNC: 8.1 MG/DL (ref 8.8–10.2)
CHLORIDE SERPL-SCNC: 112 MMOL/L (ref 98–107)
COLOR UR AUTO: YELLOW
CREAT SERPL-MCNC: 0.79 MG/DL (ref 0.67–1.17)
DEPRECATED HCO3 PLAS-SCNC: 22 MMOL/L (ref 22–29)
ERYTHROCYTE [DISTWIDTH] IN BLOOD BY AUTOMATED COUNT: 18.9 % (ref 10–15)
GFR SERPL CREATININE-BSD FRML MDRD: 87 ML/MIN/1.73M2
GLUCOSE SERPL-MCNC: 107 MG/DL (ref 70–99)
GLUCOSE UR STRIP-MCNC: NEGATIVE MG/DL
HCT VFR BLD AUTO: 27.4 % (ref 40–53)
HGB BLD-MCNC: 8.3 G/DL (ref 13.3–17.7)
HGB BLD-MCNC: 8.3 G/DL (ref 13.3–17.7)
HGB BLD-MCNC: 9.2 G/DL (ref 13.3–17.7)
HGB UR QL STRIP: NEGATIVE
KETONES UR STRIP-MCNC: 10 MG/DL
LEUKOCYTE ESTERASE UR QL STRIP: ABNORMAL
MCH RBC QN AUTO: 31.6 PG (ref 26.5–33)
MCHC RBC AUTO-ENTMCNC: 30.3 G/DL (ref 31.5–36.5)
MCV RBC AUTO: 104 FL (ref 78–100)
MUCOUS THREADS #/AREA URNS LPF: PRESENT /LPF
NITRATE UR QL: NEGATIVE
PH UR STRIP: 6.5 [PH] (ref 5–7)
PLATELET # BLD AUTO: 333 10E3/UL (ref 150–450)
POTASSIUM SERPL-SCNC: 4.1 MMOL/L (ref 3.4–5.3)
RBC # BLD AUTO: 2.63 10E6/UL (ref 4.4–5.9)
RBC URINE: 7 /HPF
SODIUM SERPL-SCNC: 142 MMOL/L (ref 136–145)
SP GR UR STRIP: 1.03 (ref 1–1.03)
UROBILINOGEN UR STRIP-MCNC: NORMAL MG/DL
WBC # BLD AUTO: 12.5 10E3/UL (ref 4–11)
WBC URINE: 11 /HPF
YEAST #/AREA URNS HPF: ABNORMAL /HPF
YEAST #/AREA URNS HPF: ABNORMAL /HPF

## 2022-12-20 PROCEDURE — 87086 URINE CULTURE/COLONY COUNT: CPT | Performed by: INTERNAL MEDICINE

## 2022-12-20 PROCEDURE — 250N000013 HC RX MED GY IP 250 OP 250 PS 637: Performed by: STUDENT IN AN ORGANIZED HEALTH CARE EDUCATION/TRAINING PROGRAM

## 2022-12-20 PROCEDURE — C9113 INJ PANTOPRAZOLE SODIUM, VIA: HCPCS | Performed by: INTERNAL MEDICINE

## 2022-12-20 PROCEDURE — 87106 FUNGI IDENTIFICATION YEAST: CPT | Performed by: INTERNAL MEDICINE

## 2022-12-20 PROCEDURE — 73140 X-RAY EXAM OF FINGER(S): CPT | Mod: LT

## 2022-12-20 PROCEDURE — 73060 X-RAY EXAM OF HUMERUS: CPT | Mod: RT

## 2022-12-20 PROCEDURE — 99233 SBSQ HOSP IP/OBS HIGH 50: CPT | Performed by: INTERNAL MEDICINE

## 2022-12-20 PROCEDURE — 120N000001 HC R&B MED SURG/OB

## 2022-12-20 PROCEDURE — 250N000011 HC RX IP 250 OP 636: Performed by: INTERNAL MEDICINE

## 2022-12-20 PROCEDURE — 81001 URINALYSIS AUTO W/SCOPE: CPT | Performed by: INTERNAL MEDICINE

## 2022-12-20 PROCEDURE — 97530 THERAPEUTIC ACTIVITIES: CPT | Mod: GP

## 2022-12-20 PROCEDURE — 85018 HEMOGLOBIN: CPT | Performed by: INTERNAL MEDICINE

## 2022-12-20 PROCEDURE — 80048 BASIC METABOLIC PNL TOTAL CA: CPT | Performed by: INTERNAL MEDICINE

## 2022-12-20 PROCEDURE — 250N000013 HC RX MED GY IP 250 OP 250 PS 637: Performed by: INTERNAL MEDICINE

## 2022-12-20 PROCEDURE — 36415 COLL VENOUS BLD VENIPUNCTURE: CPT | Performed by: INTERNAL MEDICINE

## 2022-12-20 PROCEDURE — 85027 COMPLETE CBC AUTOMATED: CPT | Performed by: INTERNAL MEDICINE

## 2022-12-20 PROCEDURE — 73502 X-RAY EXAM HIP UNI 2-3 VIEWS: CPT

## 2022-12-20 RX ADMIN — OXYCODONE HYDROCHLORIDE AND ACETAMINOPHEN 1000 MG: 500 TABLET ORAL at 09:09

## 2022-12-20 RX ADMIN — PANTOPRAZOLE SODIUM 40 MG: 40 INJECTION, POWDER, FOR SOLUTION INTRAVENOUS at 09:10

## 2022-12-20 RX ADMIN — MICONAZOLE NITRATE: 20 POWDER TOPICAL at 20:07

## 2022-12-20 RX ADMIN — MAGNESIUM OXIDE TAB 400 MG (241.3 MG ELEMENTAL MG) 400 MG: 400 (241.3 MG) TAB at 09:10

## 2022-12-20 RX ADMIN — TAMSULOSIN HYDROCHLORIDE 0.4 MG: 0.4 CAPSULE ORAL at 09:09

## 2022-12-20 RX ADMIN — APIXABAN 5 MG: 5 TABLET, FILM COATED ORAL at 20:06

## 2022-12-20 RX ADMIN — ACETAMINOPHEN 975 MG: 325 TABLET, FILM COATED ORAL at 18:29

## 2022-12-20 RX ADMIN — Medication 50 MCG: at 09:09

## 2022-12-20 RX ADMIN — Medication 1 TABLET: at 09:09

## 2022-12-20 RX ADMIN — DONEPEZIL HYDROCHLORIDE 5 MG: 5 TABLET ORAL at 22:05

## 2022-12-20 RX ADMIN — AMOXICILLIN AND CLAVULANATE POTASSIUM 1 TABLET: 875; 125 TABLET, FILM COATED ORAL at 20:06

## 2022-12-20 RX ADMIN — AMOXICILLIN AND CLAVULANATE POTASSIUM 1 TABLET: 875; 125 TABLET, FILM COATED ORAL at 09:09

## 2022-12-20 RX ADMIN — ACETAMINOPHEN 975 MG: 325 TABLET, FILM COATED ORAL at 09:09

## 2022-12-20 RX ADMIN — MICONAZOLE NITRATE: 20 POWDER TOPICAL at 09:10

## 2022-12-20 RX ADMIN — SERTRALINE HYDROCHLORIDE 50 MG: 50 TABLET ORAL at 09:09

## 2022-12-20 ASSESSMENT — ACTIVITIES OF DAILY LIVING (ADL)
ADLS_ACUITY_SCORE: 57
ADLS_ACUITY_SCORE: 53
ADLS_ACUITY_SCORE: 57
ADLS_ACUITY_SCORE: 57
ADLS_ACUITY_SCORE: 55
ADLS_ACUITY_SCORE: 53
ADLS_ACUITY_SCORE: 53
ADLS_ACUITY_SCORE: 57
ADLS_ACUITY_SCORE: 57
ADLS_ACUITY_SCORE: 53

## 2022-12-20 NOTE — PROGRESS NOTES
"[facility] Progress Note  [facility]    Saw pt Scott per staff request for emotional support for pt.  Scott presented as being restless and when asked if he wanted something that he did not want, he would become tearful.    When asked how he was feeling, Scott reported \"not good.\"  Attempted to engage him in light-hearted conversation and distract him with his stuffed animal but Scott would become restless again.  We welcomed prayer and prayed the Our Father and Hail [place] with me.      Scott attempted to show me his penis.  With help from his RN, he affirmed that his diaper was wet.    Tried to find a music channel he might like.  I could not understand his response when I asked him what type of music he like.  He fell asleep when I played \"[employer]\" music channel.      Called his niece Irina, who is also one of his guardians, to learn more about the things that Scott enjoys or finds comforting.  She shared the following:    He likes Polka music and once played the CAPNIAion.    One of his favorite TV shows in [employer]    Scott loves ice cream.    He does not like people speaking loud to him or being with a lot of people.    Scott also once enjoyed working on model cars but has not done so for a few years.    Irina shared that Scott likes his routine and feeling a sense of purpose.  She reported that he was recently diagnosed with Parkinson's and dementia.  Irina observes that Scott becomes frustrated when he cannot communicate what he wants to say or cannot understand what people are telling him.      Irina shared that it has been \"stressful\" lately with Scott's hospitalization and being short-staffed at her work.  She appreciates getting updates from Scott's medical team.  Irina named her spouse and parents as being core to her support network and mentioned that yoga is part of her self-care.    Plan: Will continue to see pt once a week, during " his admission, for ongoing emotional support.  Will consult with [facility].    Artemio Rosales M.Div., [facility]  Staff     [facility] routine referrals *[phone]  [facility] available 24/7 for emergent requests/referrals, either by paging the on-call  or by entering an ASAP/STAT consult in Epic (this will also page the on-call ).

## 2022-12-20 NOTE — PROGRESS NOTES
[facility]    Hospitalist Progress Note  Name: Scott Abbasi    MRN: [id number]  Provider: Ila Morton MD  Date of Service: 12/20/2022    Assessment & Plan   Summary of Stay: Scott Abbasi is a 87 year old male who was admitted on 12/6/2022 status post fall and was found to have right humeral fracture, right hip fracture, and left open thumb DIP joint fracture with dislocation.    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  Pelvis and right hip x-ray showed acute, displaced, and angulated intertrochanteric proximal femoral fracture; acetabular fracture could not be ruled out.  Right knee x-ray showed subtle dilation and irregularity of tibial plateau, concerning for nondisplaced fracture.  CT of the knee was recommended.  X-ray of the left hand showed left thumb DIP dislocation. CTs of head and C spine were unremarkable. CT of right knee showed no acute fracture but old healed, mildly depressed fracture of the lateral tibial plateau. Scott was admitted for further cares.     Orthopedic surgery took the patient to the operating room on 12/7 where he underwent fixation of the right hip with cephalomedullary device as well as left thumb DIP joint irrigation and excisional debridement. Postoperative course was complicated by progressive anemia, urine retention, and right upper extremity DVT (diagnosed 12/12/22) for which heparin drip was started with transition to Eliquis. Therapies recommended TCU on discharge.    On 12/17, the pt passed a large bloody stool.  Prior to that point, the patient's hemoglobin was running in the 7's after drifting  "down from a preop value of 10.2.  Stat blood draw on the evening of 12/17 showed hemoglobin of 5.7 and GI was consulted.  After transfer to the ICU, the patient was transfused 2 units packed red cells.  CTA showed \"No evidence of ongoing active GI bleeding.  Hiatal hernia with wall thickening of the lower thoracic esophagus...\" He continues to have darker colored stools hemoglobin was down to 7.  Was transfused 1 unit of PRBCs on 12/19/2022.  Subsequent hemoglobin is up to 8.3    Fall resulting in right femur fracture, s/p ORIF on 12/7  -left thumb open fracture/dislocation, s/p irrigation and excisional debridement 12/7  -right proximal humerus fracture, managed conservatively    Gastrointestinal bleed contributing to severe anemia.  Severe esophagitis w distal esophageal ulcer and non-bleeding distal esophageal ulcer identified on EGD.    -Trend hemoglobin.  Went down to 7.0  -Status posttransfusion 1 unit of PRBC 12/19/2022  -Hemoglobin this morning stable at 8.3      Acute on chronic anemia.   - Preoperative hemoglobin was 10.2 though baseline is probably about 12.  Postoperatively, the patient had significant anemia (ranging from 7-8) despite only 100 cc EBL and further exacerbated by this upper gastrointestinal bleed.  -Trend hemoglobin  -Continue PPI      Right upper extremity DVT.    -Patient had been started on anticoagulation with apixaban for this issue.  Last dose 12/17 at 9 AM.  -Anticoagulation held due to acute bleeding can resume if hemoglobin remained stable  -Resumed Eliquis today will need to monitor hemoglobin and signs of bleeding.    Leukocytosis without evidence of infection.    -Patient has been treated empirically for HCAP since surgery in the setting of evident bibasilar infiltrates on chest x-ray.  Plan has been for 7 days of antibiotics (Augmentin).    Elevated troponin without ACS.  -Likely demand ischemia    Cognitive delay complicated by dementia.    -Parkinson's disease with Lewy body " dementia  -Phenylketonuria  -Supportive care    Severe obstructive sleep apnea.  -CPAP with home setting     Generalized anxiety disorder.  -Supportive care  -Suggested scheduled activity in the day planned    Macular degeneration.     Urinary Retention  -straight cath  -check UA      DVT Prophylaxis: DOAC held for now due to bleeding  Code Status: No CPR- Do NOT Intubate    Disposition: Expected discharge to TCU in 1 to 2 days if hemoglobin remained stable    Interval History   Reviewed chart.,  Unable to get more than 10 point review of systems.  Discussed at length with patient's guardian Irina      -Data reviewed today: I reviewed all new labs and imaging reports over the last 24 hours. I personally reviewed no images or EKG's today.    Physical Exam   Temp: 97.2  F (36.2  C) Temp src: Temporal BP: 132/48 Pulse: 62   Resp: 18 SpO2: 94 % O2 Device: None (Room air)    Vitals:    12/06/22 0748 12/13/22 1522   Weight: 79.5 kg (175 lb 3.2 oz) 79.2 kg (174 lb 9.6 oz)     Vital Signs with Ranges  Temp:  [96.8  F (36  C)-97.5  F (36.4  C)] 97.2  F (36.2  C)  Pulse:  [58-76] 62  Resp:  [18-32] 18  BP: (121-156)/(39-60) 132/48  SpO2:  [93 %-96 %] 94 %  I/O last 3 completed shifts:  In: 1030 [P.O.:730]  Out: 1350 [Urine:1350]    Constitutional:  Awake responded to when called becomes teary when called..  ENT: normocepalic, without obvious abnormality  Hematologic / Lymphatic: no cervical lymphadenopathy and no supraclavicular lymphadenopathy  Respiratory: no increased work of breathing, good air exchange, no retractions and clear to auscultation, no crackles or wheezing  Cardiovascular: regular rate and rhythm and no murmur noted  GI: normal bowel sounds, soft, non-distended and non-tender  Musculoskeletal: Distal perfusion is intact.  No apparent tenderness over the calves bilaterally.  no lower extremity pitting edema present    Medications     lactated ringers Stopped (12/18/22 2117)       acetaminophen  975 mg Oral Q8H      amoxicillin-clavulanate  1 tablet Oral Q12H [facility] (08/20)     calcium carbonate-vitamin D  1 tablet Oral Daily     donepezil  5 mg Oral At Bedtime     magnesium oxide  400 mg Oral Daily     miconazole   Topical BID     pantoprazole  40 mg Intravenous QAM AC     sertraline  50 mg Oral Daily     sodium chloride (PF)  3 mL Intracatheter Q8H     sodium chloride (PF)  3 mL Intracatheter Q8H     tamsulosin  0.4 mg Oral Daily     vitamin C  1,000 mg Oral Daily     cholecalciferol  50 mcg Oral Daily     Data     Recent Labs   Lab 12/20/22  0622 12/19/22  1755 12/19/22  0702 12/18/22  1142 12/18/22  0423 12/17/22  1927 12/16/22  0728   WBC 12.5*  --   --   --  17.2*  --  15.2*   HGB 8.3*  8.3* 7.0* 7.2*   < > 7.7*  7.7*   < > 7.5*   HCT 27.4*  --   --   --  24.8*  --  23.6*   *  --   --   --  101*  --  105*     --   --   --  346  --  333    < > = values in this interval not displayed.     Recent Labs   Lab 12/20/22  0622 12/18/22  1935 12/18/22  1705 12/18/22  0850 12/18/22  0423 12/17/22  2247 12/14/22  0617     --   --   --  145  --   --    POTASSIUM 4.1  --   --   --  3.9  --   --    CHLORIDE 112*  --   --   --  114*  --   --    CO2 22  --   --   --  23  --   --    ANIONGAP 8  --   --   --  8  --   --    * 110* 149*   < > 118*   < >  --    BUN 15.0  --   --   --  30.1*  --   --    CR 0.79  --   --   --  0.87  --  0.71   GFRESTIMATED 87  --   --   --  84  --  89   FLORENTINO 8.1*  --   --   --  8.2*  --   --     < > = values in this interval not displayed.     7-Day Micro Results     Collected Updated Procedure Result Status      12/20/2022 0113 12/20/2022 0202 Urine Culture [[id number]]   Urine, Straight Catheter    In process Component Value   No component results                   Recent Labs   Lab 12/20/22  0622 12/18/22  1935 12/18/22  1705 12/18/22  0850 12/18/22  0423 12/17/22  2247 12/14/22  0617     --   --   --  145  --   --    POTASSIUM 4.1  --   --   --  3.9  --   --    CHLORIDE  112*  --   --   --  114*  --   --    CO2 22  --   --   --  23  --   --    ANIONGAP 8  --   --   --  8  --   --    * 110* 149*   < > 118*   < >  --    BUN 15.0  --   --   --  30.1*  --   --    CR 0.79  --   --   --  0.87  --  0.71   GFRESTIMATED 87  --   --   --  84  --  89   FLORENTINO 8.1*  --   --   --  8.2*  --   --     < > = values in this interval not displayed.     No results for input(s): NTBNPI, NTBNP in the last 168 hours.  Recent Labs   Lab 12/20/22  0622 12/18/22  1935 12/18/22  1705 12/18/22  1311 12/18/22  0850   * 110* 149* 105* 111*     Recent Labs   Lab 12/20/22  0622 12/19/22  1755 12/19/22  0702   HGB 8.3*  8.3* 7.0* 7.2*     No results for input(s): AST, ALT, GGT, ALKPHOS, BILITOTAL, BILICONJ, BILIDIRECT, DAMEON in the last 168 hours.    Invalid input(s): BILIRUBININDIRECT  No results for input(s): INR in the last 168 hours.  No results for input(s): LACT in the last 168 hours.  No results for input(s): LIPASE in the last 168 hours.  Recent Labs   Lab 12/20/22  0622 12/18/22  0423 12/14/22  0617   BUN 15.0 30.1*  --    CR 0.79 0.87 0.71     No results for input(s): TSH in the last 168 hours.  No results for input(s): TROPONIN, TROPI, TROPR, TROPONINIS in the last 168 hours.    Invalid input(s): TROPT, TROP, TROPONINIES, TNIH  Recent Labs   Lab 12/20/22  0113   COLOR Yellow   APPEARANCE Clear   URINEGLC Negative   URINEBILI Negative   URINEKETONE 10*   SG 1.027   UBLD Negative   URINEPH 6.5   PROTEIN 20*   NITRITE Negative   LEUKEST Trace*   RBCU 7*   WBCU 11*       No results found for this or any previous visit (from the past 24 hour(s)).

## 2022-12-20 NOTE — PROGRESS NOTES
Orthopedic Surgery  Scott Abbasi  12/20/2022     Admit Date:  12/6/2022    POD: 13 Right hip IM Nail and I&D left thumb    Patient resting comfortably in chair.    Pain continues in right shoulder.   Denies pain in hip/thumb.     Temp:  [96.8  F (36  C)-97.5  F (36.4  C)] 97.2  F (36.2  C)  Pulse:  [58-76] 62  Resp:  [18-32] 18  BP: (121-156)/(39-60) 132/48  SpO2:  [93 %-96 %] 94 %    Right hip:  Dressings peeled back, no drainage. Incisions intact.  Ecchymosis along thigh.   Minimal erythema of the surrounding skin.   Bilateral calves are soft, non-tender.  Right lower extremity is NVI.  Sensation intact bilateral lower extremities  Patient able to resist dorsi and plantar flexion bilaterally  +Dp pulse    Right humerus:  Ecchymosis right shoulder  TTP along prox humerus  Able to flex/extend fingers and wrist  Able to abduct right thumb  Pulses intact and equal    Left thumb:  No splint in place  Sutures removed and steri strips applied.  No erythema or drainage present.   Mild ecchymosis  Mild tenderness with palpation  Limited IP flexion  Sensation intact  Brisk cap refill       Labs:  Recent Labs   Lab Test 12/20/22  0622 12/19/22  1755 12/19/22  0702 12/18/22  1142 12/18/22  0423 12/17/22  1927 12/16/22  0728   WBC 12.5*  --   --   --  17.2*  --  15.2*   HGB 8.3*  8.3* 7.0* 7.2*   < > 7.7*  7.7*   < > 7.5*     --   --   --  346  --  333    < > = values in this interval not displayed.     1. PLAN:   2 week post-op xrays ordered of hip, humerus and thumb.     WBAT RLE with walker x 6 weeks.                   Can WBAT right UE for use of walker, otherwise to be NWB right UE                Avoid pressure through left thumb as able. Ok to use walker                Mobilize with PT/OT.               Dressings to be removed from hip.  Okay to shower over hip.      Steri-strips applied to left thumb, okay to shower.  Gentle  with left hand okay, avoid heavy lifting/ with left thumb.                 Sling optional for pain control right shoulder.  Okay to perform codmans/gentle ROM of shoulder                Follow-up with Dr Carlos Horowitz 6 weeks post-op.       2. Disposition   Anticipate d/c to TCU vs [facility] when medically cleared and progressing in PT.    PATRICIA MaddenC

## 2022-12-20 NOTE — PLAN OF CARE
Goal Outcome Evaluation:      Plan of Care Reviewed With: patient, family  Overall Patient Progress: declining    Outcome Evaluation: PO declining this week. Wrong Ensure being sent. Family supportive in ordering and bringing food, ensuring meals TID. Please obtain updated weight

## 2022-12-20 NOTE — PLAN OF CARE
Pt. Alert. Disoriented x4. VSS. Pt does not seem to be in any pain and denies pain when asked. Crying out at times, consoled with music and reassurance. Assist of 2 with virginia steady and KYLEIGH. Bruising on right shoulder, sling in place. Left thumb incision and left hip open to air, no drainage. Incontinent of B & B. External catheter in place. Dark green/black stool, GI following. Hbg 8.3. Groin and perineum area red, barrier cream and powder applied. Will continue to provide supportive cares.

## 2022-12-20 NOTE — PROGRESS NOTES
"CLINICAL NUTRITION SERVICES - REASSESSMENT NOTE      Recommendations Ordered by Registered Dietitian (RD):   Asked RN to get updated weight when transfers back to bed today  Ensure Enlive once/day (chocolate or vanilla)    Malnutrition:   % Weight Loss:  None noted at this time, need updated weight today  % Intake:  <75% for > 7 days (moderate malnutrition)  Subcutaneous Fat Loss:  Mild losses likely r/t aging and Parkinsonism - does not appear significant   Muscle Loss:  Mild losses likely r/t aging and Parkinsonism - does not appear significant   Fluid Retention:  None noted    Malnutrition Diagnosis: Unable to determine due to need for updated weight        EVALUATION OF PROGRESS TOWARD GOALS   Diet:  Low fiber   Ensure Enlive BID    Intake/Tolerance:  Visited patient and niece Irina today   Patient is agitated and tearful   Irina states that he is receiving Ensure clear when they requested Ensure Enlive once/day - he does not drink the clear version   He is getting meals TID and consuming 25-50% which is a decrease from last week when he was eating % meals   Irina also states that his appetite has decreased with dilaudid, blood transfusion last night and current more emotional mental state   She estimates that he has likely lost weight in this past week as well      We discussed meals. Irina thinks a family member my bring in [employer] for pt tonight - he had previously ate a hamburger from there well and loves the shakes    Diet hx obtained ~  Resides in a group home (recently moved there from foster home) where he eats well with good appetite. The facility has been looking into getting him more adaptive utensils to help him eat   Irina reports that he has lost weight \"a while ago\" but it has since been fairly stable   No known food allergies       NEW FINDINGS:   Per GI note, passing dark stool - no melena, Hgb stable at 8.3    12/17: BM x6  12/18: BM x7  12/19: BM x2    Previous Goals:   N/A "     Previous Nutrition Diagnosis:   No nutrition diagnosis at this time.  Evaluation: Declining      MALNUTRITION  % Weight Loss:  None noted at this time, need updated weight today  % Intake:  <75% for > 7 days (moderate malnutrition)  Subcutaneous Fat Loss:  Mild losses likely r/t aging and Parkinsonism - does not appear significant   Muscle Loss:  Mild losses likely r/t aging and Parkinsonism - does not appear significant   Fluid Retention:  None noted    Malnutrition Diagnosis: Unable to determine due to need for updated weight       CURRENT NUTRITION DIAGNOSIS  Inadequate oral intake related to decreased and variable appetite with dementia, prolonged hospitalization as evidenced by intakes ranging from 25-50% for the past week     INTERVENTIONS  Recommendations / Nutrition Prescription  Diet as ordered  Ensure once/day and PRN   Continue TID meals     Implementation  General/healthful diet, Medical Food Supplement and Collaboration and Referral of Nutrition care    Goals  Patient will consume at least 50% nutritionally adequate meals TID + 100% of 1 supplement/day      MONITORING AND EVALUATION:  Progress towards goals will be monitored and evaluated per protocol and Practice Guidelines      Lizette Hoyos RD, LD  Clinical Dietitian

## 2022-12-20 NOTE — PROGRESS NOTES
Care Management Follow Up    Length of Stay (days): 14    Expected Discharge Date: 12/21/2022     Concerns to be Addressed:       Patient plan of care discussed at interdisciplinary rounds: Yes    Anticipated Discharge Disposition: Home     Anticipated Discharge Services:    Anticipated Discharge DME:      Patient/family educated on Medicare website which has current facility and service quality ratings:    Education Provided on the Discharge Plan:    Patient/Family in Agreement with the Plan:      Referrals Placed by CM/SW:    Private pay costs discussed: Not applicable    Additional Information:    Received confirmation that OBRA is completed from pt IFEOMA Lopes (P: [phone]) and is faxed to the facility.    Addendum    Spoke with [facility] who explained that they could take pt tomorrow. [facility] would like updated notes, pt could not have a sitter for 24 hours prior to TCU discharge.     Addendum    Faxed PT notes from today 12/20 to [facility].     CHARAN Akbar, HEATHER  Inpatient Care Coordination  Ortho/Spine Unit  [phone]  Cheryl High, HEATHER

## 2022-12-20 NOTE — PROGRESS NOTES
"GASTROENTEROLOGY PROGRESS NOTE        SUBJECTIVE:  Denies abdominal pain. Per reports, dark green stool. No melena.      OBJECTIVE:    /48 (BP Location: Left arm)   Pulse 62   Temp 97.2  F (36.2  C) (Temporal)   Resp 18   Ht 1.753 m (5' 9\")   Wt 79.2 kg (174 lb 9.6 oz)   SpO2 94%   BMI 25.78 kg/m    Temp (24hrs), Av.3  F (36.3  C), Min:96.8  F (36  C), Max:97.5  F (36.4  C)    No data found.    Intake/Output Summary (Last 24 hours) at 2022 114  Last data filed at 2022 0747  Gross per 24 hour   Intake 1280 ml   Output 1350 ml   Net -70 ml            Additional Comments:  ROS, FH, SH: See initial GI consult for details.     I have reviewed the patient's new clinical lab results:     Recent Labs   Lab Test 22  0622  1755 22  0702 22  1142 22  0423 22  1927 22  0728   WBC 12.5*  --   --   --  17.2*  --  15.2*   HGB 8.3*  8.3* 7.0* 7.2*   < > 7.7*  7.7*   < > 7.5*   *  --   --   --  101*  --  105*     --   --   --  346  --  333    < > = values in this interval not displayed.     Recent Labs   Lab Test 22  0622 22  0423 22  0740   POTASSIUM 4.1 3.9 4.5   CHLORIDE 112* 114* 104   CO2 22 23 25   BUN 15.0 30.1* 21.5   ANIONGAP 8 8 9     Recent Labs   Lab Test 22  0113 22  1616   PROTEIN 20* 30*       ASSESSMENT/ PLAN    Scott Abbasi is an 88 yo male with history of Parkinson's disease, dementia, PKU with cognitive impairment who presents with hematochezia.  The patient has had a prolonged hospitalization secondary to a fall and multiple orthopedic fractures.  He developed hematochezia  with a 2 g hemoglobin drop.  CTA did not show any active bleeding or other GI abnormality.    EGD  with severe esophagitis in the distal 10 cm of the esophagus, adherent clot on fibrotic based ulcer (? Pill induced ulcer) with large amount of hemetin in the stomach, several small erosions in the duodenum " with poorly visualized duodenal ulcer without obvious stigmata.  Flex sig deferred due to findings.    -- Passing dark stool, no melena. HgB stable at 8.3.  --  Restarted anticoagulation yesterday as no further signs of bleeding.  --  IV PPI BID.  --  Monitor HgB; transfuse prn.    Discussed with Dr. Choi. Will no longer follow. Please call with questions or change in condition.    Grisel Trujillo PA-C  [facility] ( [facility])

## 2022-12-20 NOTE — PLAN OF CARE
Goal Outcome Evaluation:        Patient vital signs are at baseline: BP elevated.  Patient able to ambulate as they were prior to admission or with assist devices provided by therapies during their stay:  No,  Reason:  Not oob this shift, repositionned and turned.  Patient MUST void prior to discharge:  No,  Reason:  Bladder scan 414, straight cath 500, external catheter in place.  Patient able to tolerate oral intake:  Yes  Pain has adequate pain control using Oral analgesics:  Yes  Does patient have an identified :  Yes  Has goal D/C date and time been discussed with patient:  Yes        Pt agitated this shift, crying and yelling. IV dilaudid given for pain, straight cath for 500. Miconazole power applied on groin area. Meds crushed and given with apple sauce. Will continue to monitor.

## 2022-12-21 ENCOUNTER — LAB REQUISITION (OUTPATIENT)
Dept: LAB | Facility: CLINIC | Age: 87
End: 2022-12-21
Payer: COMMERCIAL

## 2022-12-21 VITALS
OXYGEN SATURATION: 95 % | HEART RATE: 72 BPM | TEMPERATURE: 96.7 F | RESPIRATION RATE: 16 BRPM | DIASTOLIC BLOOD PRESSURE: 46 MMHG | BODY MASS INDEX: 25.86 KG/M2 | HEIGHT: 69 IN | WEIGHT: 174.6 LBS | SYSTOLIC BLOOD PRESSURE: 134 MMHG

## 2022-12-21 DIAGNOSIS — Z11.1 ENCOUNTER FOR SCREENING FOR RESPIRATORY TUBERCULOSIS: ICD-10-CM

## 2022-12-21 LAB
ANION GAP SERPL CALCULATED.3IONS-SCNC: 8 MMOL/L (ref 7–15)
BACTERIA UR CULT: ABNORMAL
BUN SERPL-MCNC: 13.5 MG/DL (ref 8–23)
CALCIUM SERPL-MCNC: 8.3 MG/DL (ref 8.8–10.2)
CHLORIDE SERPL-SCNC: 109 MMOL/L (ref 98–107)
CREAT SERPL-MCNC: 0.78 MG/DL (ref 0.67–1.17)
DEPRECATED HCO3 PLAS-SCNC: 23 MMOL/L (ref 22–29)
ERYTHROCYTE [DISTWIDTH] IN BLOOD BY AUTOMATED COUNT: 18 % (ref 10–15)
GFR SERPL CREATININE-BSD FRML MDRD: 87 ML/MIN/1.73M2
GLUCOSE SERPL-MCNC: 104 MG/DL (ref 70–99)
HCT VFR BLD AUTO: 29.7 % (ref 40–53)
HGB BLD-MCNC: 8.8 G/DL (ref 13.3–17.7)
HGB BLD-MCNC: 8.8 G/DL (ref 13.3–17.7)
MCH RBC QN AUTO: 31.2 PG (ref 26.5–33)
MCHC RBC AUTO-ENTMCNC: 29.6 G/DL (ref 31.5–36.5)
MCV RBC AUTO: 105 FL (ref 78–100)
PLATELET # BLD AUTO: 357 10E3/UL (ref 150–450)
POTASSIUM SERPL-SCNC: 4.1 MMOL/L (ref 3.4–5.3)
RBC # BLD AUTO: 2.82 10E6/UL (ref 4.4–5.9)
SODIUM SERPL-SCNC: 140 MMOL/L (ref 136–145)
WBC # BLD AUTO: 11.7 10E3/UL (ref 4–11)

## 2022-12-21 PROCEDURE — 250N000013 HC RX MED GY IP 250 OP 250 PS 637: Performed by: INTERNAL MEDICINE

## 2022-12-21 PROCEDURE — 80048 BASIC METABOLIC PNL TOTAL CA: CPT | Performed by: INTERNAL MEDICINE

## 2022-12-21 PROCEDURE — 250N000013 HC RX MED GY IP 250 OP 250 PS 637: Performed by: STUDENT IN AN ORGANIZED HEALTH CARE EDUCATION/TRAINING PROGRAM

## 2022-12-21 PROCEDURE — 85027 COMPLETE CBC AUTOMATED: CPT | Performed by: INTERNAL MEDICINE

## 2022-12-21 PROCEDURE — 250N000011 HC RX IP 250 OP 636: Performed by: INTERNAL MEDICINE

## 2022-12-21 PROCEDURE — 99239 HOSP IP/OBS DSCHRG MGMT >30: CPT | Performed by: INTERNAL MEDICINE

## 2022-12-21 PROCEDURE — 36415 COLL VENOUS BLD VENIPUNCTURE: CPT | Performed by: INTERNAL MEDICINE

## 2022-12-21 PROCEDURE — 250N000013 HC RX MED GY IP 250 OP 250 PS 637: Performed by: PHYSICIAN ASSISTANT

## 2022-12-21 PROCEDURE — C9113 INJ PANTOPRAZOLE SODIUM, VIA: HCPCS | Performed by: INTERNAL MEDICINE

## 2022-12-21 RX ORDER — PANTOPRAZOLE SODIUM 40 MG/1
40 TABLET, DELAYED RELEASE ORAL DAILY
Qty: 30 TABLET | Refills: 3 | Status: SHIPPED | OUTPATIENT
Start: 2022-12-21 | End: 2023-02-19

## 2022-12-21 RX ORDER — TAMSULOSIN HYDROCHLORIDE 0.4 MG/1
0.4 CAPSULE ORAL DAILY
Qty: 30 CAPSULE | Refills: 11 | Status: SHIPPED | OUTPATIENT
Start: 2022-12-22 | End: 2023-01-21

## 2022-12-21 RX ADMIN — ACETAMINOPHEN 975 MG: 325 TABLET, FILM COATED ORAL at 09:53

## 2022-12-21 RX ADMIN — AMOXICILLIN AND CLAVULANATE POTASSIUM 1 TABLET: 875; 125 TABLET, FILM COATED ORAL at 07:25

## 2022-12-21 RX ADMIN — PANTOPRAZOLE SODIUM 40 MG: 40 INJECTION, POWDER, FOR SOLUTION INTRAVENOUS at 07:20

## 2022-12-21 RX ADMIN — TAMSULOSIN HYDROCHLORIDE 0.4 MG: 0.4 CAPSULE ORAL at 07:24

## 2022-12-21 RX ADMIN — MICONAZOLE NITRATE: 20 POWDER TOPICAL at 07:31

## 2022-12-21 RX ADMIN — MAGNESIUM OXIDE TAB 400 MG (241.3 MG ELEMENTAL MG) 400 MG: 400 (241.3 MG) TAB at 07:24

## 2022-12-21 RX ADMIN — HYDROMORPHONE HYDROCHLORIDE 2 MG: 2 TABLET ORAL at 00:36

## 2022-12-21 RX ADMIN — SERTRALINE HYDROCHLORIDE 50 MG: 50 TABLET ORAL at 07:24

## 2022-12-21 RX ADMIN — OXYCODONE HYDROCHLORIDE AND ACETAMINOPHEN 1000 MG: 500 TABLET ORAL at 07:24

## 2022-12-21 RX ADMIN — Medication 1 MG: at 02:08

## 2022-12-21 RX ADMIN — Medication 50 MCG: at 07:24

## 2022-12-21 RX ADMIN — ACETAMINOPHEN 975 MG: 325 TABLET, FILM COATED ORAL at 02:08

## 2022-12-21 RX ADMIN — APIXABAN 5 MG: 5 TABLET, FILM COATED ORAL at 07:24

## 2022-12-21 RX ADMIN — Medication 1 TABLET: at 07:24

## 2022-12-21 ASSESSMENT — ACTIVITIES OF DAILY LIVING (ADL)
ADLS_ACUITY_SCORE: 53

## 2022-12-21 NOTE — PROGRESS NOTES
SPIRITUAL HEALTH SERVICES  SPIRITUAL ASSESSMENT Progress Note  [facility]. [address] ortho     REFERRAL SOURCE: Staff request for emotional support.    Provided brief emotional support; chatted with Scott who then stated he is tired and began snoring. Sat with pt to confirm he remained asleep.    Spiritual Health remains available at patient's request for the duration of admission.    Deja Méndez MDiv  Staff   [facility] routine referrals *[phone]  [facility] available 24/7 for emergent requests/referrals, either by having the on-call  paged or by entering an ASAP/STAT consult in Epic (this will also page the on-call ).

## 2022-12-21 NOTE — PLAN OF CARE
Goal Outcome Evaluation:    3pm-11pm RN    Patient VS are at baseline: Yes  Patient able to ambulate as they were prior to admission or with assist devices provided by therapy during their stay: No is using the virginia steady with assist of two.  Patient must void prior to discharge: Yes is using the male external catheter  Patient able to tolerate oral intake: Yes but has a poor appetite  Patient has adequate pain control using oral analgesics: Yes    Patient up using the virginia steady with assist of two. Tylenol used for pain management. Surgical site on right hip ERINN. Sling on right arm. Patient calling out on and off during the shift. Is using the male external catheter. Hgb at 1800 9.2. Plan is to discharge to TCU.

## 2022-12-21 NOTE — PLAN OF CARE
Pt. A/O to self only. VSS. Bruising on right shoulder and hip. Denies pain, tenderness on right side when turning. Arm sling in place but falls off due to pts confusion. Assist of 2 with virginia gordon. Incontinent B&B. External catheter in place. Redness to groin and perineum, powder and barrier cream applied. Meds crushed in applesauce. Tolerating low fiber diet. Hgb 8.8 this morning. [facility] Irina Lion, called and updated. She and his group home nurse will meet him at the TCU. Will continue to provide supportive cares.

## 2022-12-21 NOTE — PROGRESS NOTES
Care Management Discharge Note    Discharge Date: 12/21/2022       Discharge Disposition: Skilled Nursing Facility    Discharge Services:      Discharge DME:      Discharge Transportation: agency    Private pay costs discussed: transportation costs  Reviewed out of pocket cost for [facility] stretcher transport, $1117.00 for base rate and $26.06 per mile to the destination. Pt/family expressed understanding and are agreeable to this.      Patient requires stretcher transportation due to dementia and developmental delay-needs supervision    Stretcher transportation has been arranged for 1600. Patient and bedside nurse notified of transportation time.    Ambulance PCS form completed and placed in patient chart. Ambulance PCS form should be given to transportation team.    PAS Confirmation Code:    Patient/family educated on Medicare website which has current facility and service quality ratings:      Education Provided on the Discharge Plan:    Persons Notified of Discharge Plans: pt guardian, RN, [facility], facility, Group Home  Patient/Family in Agreement with the Plan:      Handoff Referral Completed: No    Additional Information:    Pt is set to discharge to [facility] at 1600 via [facility] stretcher.     Spoke to pt prudence Arevalo who expressed disappointment that pt is discharging today. Pt prudence does not feel that 6 hours notice is acceptable as she had anticipated discharge later in the week. Acknowledged frustrations and referred to patient relations.     Spoke with Lorraine at pt [facility] (P: [phone] F: [phone]) to inform of 1600 discharge to [facility]. Lorraine from pt  explained that she thought pt glasses and CPAP were at the hospital and would want [facility] to [facility]. Notified RN.     Pt set to discharge to [facility] via stretcher at 1600. PAS completed. Orders faxed.     CHARAN Akbar, [facility]  Inpatient Care Coordination  Ortho/Spine Unit  [phone]  Cheryl High, [facility]

## 2022-12-21 NOTE — PLAN OF CARE
"Physical Therapy Discharge Summary    Reason for therapy discharge:    Discharged to transitional care facility.    Progress towards therapy goal(s). See goals on Care Plan in [facility] electronic health record for goal details.  Goals not met.  Barriers to achieving goals:   discharge from facility.    Therapy recommendation(s):    Continued therapy is recommended.  Rationale/Recommendations:  Per prior PT recommendation, \"Patient remains below baseline for mobility. Patient requires Mod-MaxAx2 for bed mobility and ModAx2 for sit to stand transfers. Requires SaraStedy to transfer from bed to chair. Patient able to follow simple cues with reinforcement. Recommend TCU in order to progress mobility and strength. If patient denies TCU, would require a lift device at his [facility] and 24/7 assist of 2 for all mobility\".      "

## 2022-12-21 NOTE — PLAN OF CARE
Pt alert and oriented to self. Pt confused and cries out often - easy to soothe once at bedside and talking to him. Pt complains of minimal pain, managed well with 1 dose of oral dilaudid and scheduled tylenol. Up with assist of 2 with [place]ia steady to the bathroom. Right hip and arm very bruised, incisions open to air. Pt continuously taking sling off right arm. IV saline locked, pt has nono over it. Pt ripped external catheter off, was able to later in shift tell us he needed to go to the bathroom - able to go on toilet, no incontinence. No stools this shift. Plan is to discharge to TCU, looks like [facility]. Will continue to monitor.

## 2022-12-21 NOTE — DISCHARGE SUMMARY
[facility]  Hospitalist Discharge Summary      Date of Admission:  12/6/2022  Date of Discharge:  12/21/2022  Discharging Provider: Mehul Balderrama MD  Discharge Service: Hospitalist Service    Discharge Diagnoses   Gi HEMORRHAGE   ANEMIA DUE TO ACUTE BLOOD LOSS   RIGHT UPPER EXTREMITY DVT  TROPONIN LEVEL ELEVATED DUE TO DEMAND ISCHEMIA   PNEUMONIA  URINARY RETENTION  GENERALIZED ANXIETY DISORDER  IVONNE    Follow-ups Needed After Discharge   Follow-up Appointments     Follow Up and recommended labs and tests      Follow up with Dr. Carlos Horowitz at [facility] 6 weeks after surgery.  If   still in TCU at this time, can be seen by the orthopedic provider at the   care facility or undergo repeat x-rays.  X-rays can be sent to [facility].       Call the care coordinator at [phone] to arrange the appt.   [facility]: [phone]  Walk in clinic 8am- 8pm         Additional follow-up instructions/to-do's for PCP    FOLLOW UP H&H AND IRON THERAPY FOR BLOOD LOSS ANEMIA AS NECESSARY     Unresulted Labs Ordered in the Past 30 Days of this Admission     No orders found from 11/6/2022 to 12/7/2022.          Discharge Disposition   Discharged to nursing home  Condition at discharge: Stable  Patient ready to discharge to a skilled nursing facility as soon as possible in order to create capacity for patients related to the COVID-19 pandemic.    Hospital Course   Summary of Stay: Scott Abbasi is a 87 year old male who was admitted on 12/6/2022 status post fall and was found to have right humeral fracture, right hip fracture, and left open thumb DIP joint fracture with dislocation.     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  Pelvis and right hip x-ray showed acute, displaced, and angulated intertrochanteric proximal femoral fracture; acetabular fracture could not be ruled out.  Right knee x-ray showed subtle dilation and irregularity of tibial plateau, concerning for nondisplaced fracture.  CT of the knee was recommended.  X-ray of the left hand showed left thumb DIP dislocation. CTs of head and C spine were unremarkable. CT of right knee showed no acute fracture but old healed, mildly depressed fracture of the lateral tibial plateau. Scott was admitted for further cares.     Orthopedic surgery took the patient to the operating room on 12/7 where he underwent fixation of the right hip with cephalomedullary device as well as left thumb DIP joint irrigation and excisional debridement. Postoperative course was complicated by progressive anemia, urine retention, and right upper extremity DVT (diagnosed 12/12/22) for which heparin drip was started with transition to Eliquis. Therapies recommended TCU on discharge.     On 12/17, the pt passed a large bloody stool.  Prior to that point, the patient's hemoglobin was running in the 7's after drifting down from a preop value of 10.2.  Stat blood draw on the evening of 12/17 showed hemoglobin of 5.7 and GI was consulted.  After transfer to the ICU, the patient was transfused 2 units packed red cells.  CTA showed \"No evidence of ongoing active GI bleeding.  Hiatal hernia with wall thickening of the lower thoracic esophagus...\" He continues to have darker colored stools hemoglobin was down to 7.  Was transfused 1 unit of PRBCs on 12/19/2022.  Subsequent hemoglobin is up to 8.3     Fall resulting in right femur fracture, s/p ORIF on 12/7  -left thumb open fracture/dislocation, s/p irrigation and excisional debridement 12/7  -right proximal humerus fracture, managed conservatively     Gastrointestinal " bleed contributing to severe anemia.  Severe esophagitis w distal esophageal ulcer and non-bleeding distal esophageal ulcer identified on EGD.      -Status posttransfusion 1 unit of PRBC 12/19/2022  -Hemoglobin this morning stable at 8.8        Acute on chronic anemia.   - Preoperative hemoglobin was 10.2 though baseline is probably about 12.  Postoperatively, the patient had significant anemia (ranging from 7-8) despite only 100 cc EBL and further exacerbated by this upper gastrointestinal bleed.  -Continue PPI X2         Right upper extremity DVT.    -Patient had been started on anticoagulation with apixaban for this issue.  Last dose 12/17 at 9 AM.  -Anticoagulation held due to acute bleeding can resume if hemoglobin remained stable  -Resumed Eliquis  FOR DVT WHICH PT WOULD PROBABLY NEED FOR 3 MONTHS   Leukocytosis without evidence of infection.    -Patient has been treated empirically for HCAP since surgery in the setting of evident bibasilar infiltrates on chest x-ray.  Plan has been for 7 days of antibiotics (Augmentin). 2 DAYS OF AUGMENTIN AT DISCHARGE.     Elevated troponin without ACS.  -Likely demand ischemia     Cognitive delay complicated by dementia.    -Parkinson's disease with Lewy body dementia  -Phenylketonuria  -Supportive care     Severe obstructive sleep apnea.  -CPAP with home setting      Generalized anxiety disorder.  -Supportive care  -Suggested scheduled activity in the day planned     Macular degeneration.      Urinary Retention  Tamsulocin daily           Consultations This Hospital Stay   PHYSICAL THERAPY ADULT IP CONSULT  ORTHOPEDIC SURGERY IP CONSULT  ADVANCE DIRECTIVE IP CONSULT  CARE MANAGEMENT / SOCIAL WORK IP CONSULT  PHYSICAL THERAPY ADULT IP CONSULT  OCCUPATIONAL THERAPY ADULT IP CONSULT  PHYSICAL THERAPY ADULT IP CONSULT  OCCUPATIONAL THERAPY ADULT IP CONSULT  SOCIAL WORK IP CONSULT  PHARMACY DISCHARGE EDUCATION BY PHARMACIST  PHARMACY IP CONSULT  PHARMACY IP CONSULT  PHARMACY  DISCHARGE EDUCATION BY PHARMACIST  GASTROENTEROLOGY IP CONSULT  OCCUPATIONAL THERAPY ADULT IP CONSULT  PHYSICAL THERAPY ADULT IP CONSULT    Code Status   No CPR- Do NOT Intubate    Time Spent on this Encounter   I, Mehul Balderrama MD, personally saw the patient today and spent greater than 30 minutes discharging this patient.       Mehul Balderrama MD  [facility]TH[facility]  [address]  [facility] [address]  Phone: [phone]  Fax: [phone]  ______________________________________________________________________    Physical Exam   Vital Signs: Temp: 97.3  F (36.3  C) Temp src: Temporal BP: 126/54 Pulse: 60   Resp: 16 SpO2: 92 % O2 Device: None (Room air)    Weight: 174 lbs 9.6 oz  Constitutional:  Awake responded to when called becomes teary when called..  ENT: normocepalic, without obvious abnormality  Hematologic / Lymphatic: no cervical lymphadenopathy and no supraclavicular lymphadenopathy  Respiratory: no increased work of breathing, good air exchange, no retractions and clear to auscultation, no crackles or wheezing  Cardiovascular: regular rate and rhythm and no murmur noted  GI: normal bowel sounds, soft, non-distended and non-tender  Musculoskeletal: Distal perfusion is intact.  No apparent tenderness over the calves bilaterally.  no lower extremity pitting edema present       Primary Care Physician   [facility]    Discharge Orders      General info for SNF    Length of Stay Estimate: Short Term Care: Estimated # of Days <30  Condition at Discharge: Improving  Level of care:skilled   Rehabilitation Potential: Good  Admission H&P remains valid and up-to-date: Yes  Recent Chemotherapy: N/A  Use Nursing Home Standing Orders: Yes     Mantoux instructions    Give two-step Mantoux (PPD) Per Facility Policy Yes     Reason for your hospital stay    Right hip fracture: IM nail, Right proximal humerus fracture (non-operative), left open thumb fracture (I&D)      Activity - Up with assistive device    Up with walker/assist     Wound care (specify)    Site:   Right hip, shoulder and left thumb   Instructions:  Okay to shower over hip and thumb.  Remove right arm sling to shower.     Weight bearing status    WBAT Right LE, WBAT right UE for use of walker, otherwise NWB right shoulder, avoid pressure through left thumb as able.  Okay to ROM right shoulder gently and do codman exercises     Follow Up and recommended labs and tests    Follow up with Dr. Carlos Horowitz at [facility] 6 weeks after surgery.  If still in TCU at this time, can be seen by the orthopedic provider at the care facility or undergo repeat x-rays.  X-rays can be sent to [facility].       Call the care coordinator at [phone] to arrange the appt.   [facility]: [phone]  Walk in clinic 8am- 8pm     Physical Therapy Adult Consult    Evaluate and treat as clinically indicated.    Reason:  Right hip fracture: IM nail, Right proximal humerus fracture (non-operative), left open thumb fracture (I&D)     Occupational Therapy Adult Consult    Evaluate and treat as clinically indicated.    Reason:  Right hip fracture: IM nail, Right proximal humerus fracture (non-operative), left open thumb fracture (I&D)     Fall precautions     Cane DME    DME Documentation: Describe the reason for need to support medical necessity: Impaired gait status post hip surgery. I, the undersigned, certify that the above prescribed supplies are medically necessary for this patient and is both reasonable and necessary in reference to accepted standards of medical practice in the treatment of this patient's condition and is not prescribed as a convenience.     Walker DME    : DME Documentation: Describe the reason for need to support medical necessity: Impaired gait status post hip surgery. I, the undersigned, certify that the above prescribed supplies are medically necessary for this patient and is both reasonable and  necessary in reference to accepted standards of medical practice in the treatment of this patient's condition and is not prescribed as a convenience.     Diet    Follow this diet upon discharge: Low residue and Regular       Significant Results and Procedures   Most Recent 3 CBC's:Recent Labs   Lab Test 12/21/22  0704 12/20/22 1829 12/20/22 0622 12/18/22  1142 12/18/22  0423   WBC 11.7*  --  12.5*  --  17.2*   HGB 8.8*  8.8* 9.2* 8.3*  8.3*   < > 7.7*  7.7*   *  --  104*  --  101*     --  333  --  346    < > = values in this interval not displayed.     Most Recent 3 BMP's:Recent Labs   Lab Test 12/21/22  0704 12/20/22 0622 12/18/22  1935 12/18/22  0850 12/18/22  0423    142  --   --  145   POTASSIUM 4.1 4.1  --   --  3.9   CHLORIDE 109* 112*  --   --  114*   CO2 23 22  --   --  23   BUN 13.5 15.0  --   --  30.1*   CR 0.78 0.79  --   --  0.87   ANIONGAP 8 8  --   --  8   FLORENTINO 8.3* 8.1*  --   --  8.2*   * 107* 110*   < > 118*    < > = values in this interval not displayed.     Most Recent 2 LFT's:No lab results found.  Most Recent 3 Hemoglobins:Recent Labs   Lab Test 12/21/22  0704 12/20/22 1829 12/20/22 0622   HGB 8.8*  8.8* 9.2* 8.3*  8.3*     Most Recent 3 Troponin's:No lab results found.  Most Recent TSH and T4:No lab results found.  Most Recent Urinalysis:Recent Labs   Lab Test 12/20/22  0113   COLOR Yellow   APPEARANCE Clear   URINEGLC Negative   URINEBILI Negative   URINEKETONE 10*   SG 1.027   UBLD Negative   URINEPH 6.5   PROTEIN 20*   NITRITE Negative   LEUKEST Trace*   RBCU 7*   WBCU 11*   ,   Results for orders placed or performed during the hospital encounter of 12/06/22   CT Head w/o Contrast    Narrative    CT SCAN OF THE HEAD WITHOUT CONTRAST   12/6/2022 8:58 AM     HISTORY: Unwitnessed fall, dementia.    TECHNIQUE:  Axial images of the head and coronal reformations without  IV contrast material. Radiation dose for this scan was reduced using  automated exposure  control, adjustment of the mA and/or kV according  to patient size, or iterative reconstruction technique.    COMPARISON: None.    FINDINGS:  Suboptimal patient positioning. No evidence of hemorrhage.  Volume loss and patchy/confluent white matter hypoattenuation which  likely represents advanced chronic small vessel ischemic change. No  acute osseous anomaly.      Impression    IMPRESSION:  No acute abnormality identified.    ABAD PEACE MD         SYSTEM ID:  [id number]   CT Cervical Spine w/o Contrast    Narrative    CT CERVICAL SPINE WITHOUT CONTRAST   12/6/2022 8:59 AM     HISTORY: Unwitnessed fall, dementia.     TECHNIQUE: Axial images of the cervical spine were obtained without  intravenous contrast. Multiplanar reformations were performed.   Radiation dose for this scan was reduced using automated exposure  control, adjustment of the mA and/or kV according to patient size, or  iterative reconstruction technique.    COMPARISON: None.    FINDINGS: No evidence of acute fracture or posttraumatic subluxation.  Moderate degenerative disc disease at C3-C4, C5-C6, and C6-C7.  Background of mild degenerative disc disease. Multilevel facet  arthropathy. Mild spinal canal stenoses at multiple levels related to  disc osteophyte complexes. Severe foraminal stenoses at multiple  levels bilaterally. Presumed atelectasis at the lung apices. Calcified  left upper lobe lung nodule.      Impression    IMPRESSION: No evidence of acute fracture or subluxation in the  cervical spine.    ABAD PEACE MD         SYSTEM ID:  [id number]   XR Pelvis w Hip Right G/E 2 Views    Narrative    XR PELVIS AND HIP RIGHT 2 VIEWS 12/6/2022 10:27 AM     HISTORY: fall, trochanteric pain    COMPARISON: None.       Impression    IMPRESSION: There is an acute displaced and angulated  intertrochanteric right proximal femoral fracture. No hip dislocation.  Minimal underlying right hip arthrosis.    There is an additional vertical lucency seen on  the AP view projecting  over the superior acetabulum. While this may be projectional, an  acetabular fracture is difficult to completely exclude on this study.    Prostate radiation seeds. Bones are demineralized.    NOTE: ABNORMAL REPORT    THE DICTATION ABOVE DESCRIBES AN ABNORMAL REPORT FOR WHICH FOLLOW-UP  IS NEEDED.    LENNY STEWART MD         SYSTEM ID:  [id number]   XR Knee Right 1/2 Views    Narrative    XR KNEE RIGHT 1/2 VIEWS 12/6/2022 10:27 AM     HISTORY: fall, knee pain    COMPARISON: None.       Impression    IMPRESSION: Subtle undulation and irregularity of the tibial plateau  does raise concern for a nondisplaced fracture. Question complex joint  effusion. Recommend CT of the knee further evaluation.    No significant joint space narrowing.    NOTE: ABNORMAL REPORT    THE DICTATION ABOVE DESCRIBES AN ABNORMAL REPORT FOR WHICH FOLLOW-UP  IS NEEDED.    LENNY STEWART MD         SYSTEM ID:  [id number]   XR Shoulder Right G/E 3 Views    Narrative    XR SHOULDER RIGHT G/E 3 VIEWS 12/6/2022 10:26 AM     HISTORY: fall, right shoulder pain    COMPARISON: None.       Impression    IMPRESSION: Acute impacted complex proximal humerus fracture with  surgical neck and tuberosity involvement. There is surrounding soft  tissue swelling and varus angulation.    Mild glenohumeral and acromioclavicular joint degenerative change.  Bones are demineralized.    NOTE: ABNORMAL REPORT    THE DICTATION ABOVE DESCRIBES AN ABNORMAL REPORT FOR WHICH FOLLOW-UP  IS NEEDED.    LENNY STEWART MD         SYSTEM ID:  [id number]   XR Hand Left G/E 3 Views    Narrative    XR HAND LEFT G/E 3 VIEWS 12/6/2022 10:28 AM     HISTORY: left DIP dislocation. Pain.    COMPARISON: None.       Impression    IMPRESSION: The bones are demineralized. There is no evidence of acute  fracture. No DIP joint dislocation seen involving the second through  fifth fingers on this study. No evidence of acute fracture. Severe  first MCP joint  arthrosis.    LENNY STEWART MD         SYSTEM ID:  [id number]   CT Knee Right w/o Contrast    Narrative    CT OF THE LEFT KNEE 12/6/2022 11:42 AM    INDICATION: Knee pain. Recent fall.  TECHNIQUE: Noncontrast. Axial, sagittal and coronal thin-section  reconstruction. Dose reduction techniques were used.   CONTRAST: None.    FINDINGS:   No acute fracture. Healing mildly depressed fracture of the lateral  tibial plateau. Mild degenerative changes in the medial and lateral  compartments. Osteopenia.    No joint effusion. No muscle atrophy. No soft tissue edema.      Impression    IMPRESSION:  1.  No acute fracture.    2.  Old healed mildly depressed fracture of the lateral tibial  plateau.    3.  Mild degenerative changes in the medial and lateral compartments.      LADONNA MOHAMUD MD         SYSTEM ID:  [id number]   XR Surgery LUCY L/T 5 Min Fluoro w Stills    Narrative    This exam was marked as non-reportable because it will not be read by a   radiologist or a [facility] non-radiologist provider.         XR Femur Port Right 2 Views    Narrative    FEMUR PORTABLE RIGHT TWO VIEWS December 7, 2022 12:13 PM     INDICATION: Right hip postoperative follow-up.     COMPARISON: 12/6/2022.      Impression    IMPRESSION:  1.  Interval open reduction internal fixation right proximal femur  fracture with intramedullary nail and femoral neck screw fixation. The  hardware is intact.  2.  Postoperative soft tissue gas in the right thigh.  3.  No joint malalignment.  4.  Atherosclerotic calcification.    JANETTE VICTORIA MD         SYSTEM ID:  [id number]   US Upper Extremity Non Vascular Right    Narrative    EXAM: US UPPER EXTREMITY NON VASCULAR RIGHT  LOCATION: [facility]  DATE/TIME: 12/10/2022 10:27 AM    INDICATION: Fractured right humerus. Please assess for hematoma. Unexplained drop in hemoglobin. Pain.  COMPARISON: None.  TECHNIQUE: Routine.    FINDINGS: Survey views of the right arm in the region of  soft tissue swelling shows no evidence of a fluid collection in the area of bruising.      Impression    IMPRESSION:  1.  No discrete fluid collection in the right upper arm.   US Lower Extremity Non Vascular Right    Narrative    EXAM: US LOWER EXTREMITY NON VASCULAR RIGHT  LOCATION: [facility]  DATE/TIME: 12/10/2022 10:28 AM    INDICATION: Right hip fx s p surgical fix. Please assess for hematoma given unexplained Hgb drop. Recent hip surgery.  COMPARISON: 12/07/2022 radiographs.  TECHNIQUE: Routine.    FINDINGS: Survey views of the lateral portion of the right hip area of patient's incision demonstrates a small fluid collection measuring 3.0 x 1.0 x 1.3 cm, likely hematoma. There is some poorly defined subcutaneous soft tissue edema.      Impression    IMPRESSION:  1.  Small fluid collection in the right lateral hip likely hematoma. If there are clinical or laboratory findings to suggest infection, aspiration could be performed.   US Upper Extremity Venous Duplex Right     Value    Radiologist flags DVT (AA)    Narrative    EXAM: US UPPER EXTREMITY VENOUS DUPLEX RIGHT  LOCATION: [facility]  DATE/TIME: 12/12/2022 5:03 PM    INDICATION: Right Upper extremity venous Doppler US, humerus fx, progressive increase in swelling and bruising.  COMPARISON: None.  TECHNIQUE: Venous Duplex ultrasound of the right upper extremity with (when possible) and without compression, augmentation, and duplex. Color flow and spectral Doppler with waveform analysis performed.    FINDINGS: Ultrasound includes evaluation of the internal jugular vein, innominate vein, subclavian vein, axillary vein, and brachial vein. The superficial cephalic and basilic veins were also evaluated where seen.     RIGHT: Examination is positive for occlusive deep venous thrombosis involving one of two paired brachial veins. This does not extend into the axillary/subclavian veins. Basilic and cephalic veins are  patent.      Impression    IMPRESSION:    Occlusive deep venous thrombosis involving one of two paired brachial veins in the right upper extremity.    [Critical Result: DVT]    Finding was identified on 12/12/2022 5:49 PM.     Annemarie Arredondo RN was contacted by me on 12/12/2022 5:51 PM and verbalized understanding of the critical result.    XR Chest Port 1 View    Narrative    CHEST ONE VIEW PORTABLE    12/15/2022 10:29 AM     HISTORY: Leukocytosis. Evaluate for pneumonia.    COMPARISON: None.      Impression    IMPRESSION: Ill-defined bibasilar opacities. Pneumonia is a  possibility. Mild cardiomegaly. Bilateral mild vascular congestion.  Potential trace pleural fluid at the left costophrenic angle.    CARLITOS AGUILAR MD         SYSTEM ID:  [id number]   CTA Abdomen Pelvis with Contrast    Narrative    EXAM: CTA ABDOMEN PELVIS WITH CONTRAST  LOCATION: [facility]  DATE/TIME: 12/18/2022 12:21 AM    INDICATION: GI bleed.  COMPARISON: CT from 11/29/2010.  TECHNIQUE: CT angiogram abdomen pelvis during arterial phase of injection of IV contrast. 2D and 3D MIP reconstructions were performed by the CT technologist. Dose reduction techniques were used.  CONTRAST: 80mL Isovue 370    FINDINGS:  ANGIOGRAM ABDOMEN/PELVIS: Scattered aortic atherosclerotic calcification without aneurysm. On the arterial phase images, the aorta and its abdominal branches vessels are patent. There are areas of intraluminal hyperdensity layering dependently within the   large bowel on the noncontrast images. There is no evidence of intraluminal arterial phase contrast extravasation. There is no progressive pooling of contrast within the GI tract on the venous phase images.    LOWER CHEST: Bilateral pleural effusions with adjacent passive atelectasis. Small hiatal hernia with wall thickening of the lower thoracic esophagus.    HEPATOBILIARY: Normal gallbladder. Benign cyst in the right liver lobe requiring no follow-up.    PANCREAS:  Normal.    SPLEEN: Normal.    ADRENAL GLANDS: Normal.    KIDNEYS/BLADDER: Bilateral renal parapelvic and cortical cysts requiring no follow-up. No hydronephrosis. Bladder wall is thickened. Prominent left lateral bladder wall diverticulum containing calcific stone debris. Small foci of nondependent gas in the   bladder.    BOWEL: Diffusely fluid-filled large and small bowel throughout the abdomen. No free.    LYMPH NODES: Normal.    PELVIC ORGANS: Radiotherapy seeds in the prostate gland.    MUSCULOSKELETAL: Status post internal fixation of a comminuted right hip fracture. There is also a comminuted though nondisplaced fracture through the acetabular roof, appearance similar to a right hip MRI from 09/19/2022.      Impression    IMPRESSION:  1.  No CT evidence of ongoing active GI bleed.    2.  Hiatal hernia with wall thickening of the lower thoracic esophagus which may relate to reflux.    3.  Fluid content throughout the GI tract which can be seen in gastroenteritis.    4.  Bilateral pleural effusions with adjacent passive atelectasis.   XR Finger Left G/E 2 Views    Narrative    EXAM: XR FINGER LEFT G/E 2 VIEWS  DATE/TIME: 12/20/2022 4:18 PM     INDICATION: Left thumb postoperative follow-up.   COMPARISON: 12/6/2022.      Impression    IMPRESSION:  1.  Probable nondisplaced fracture of the left thumb distal phalanx  palmar base. There is new mild irregularity in this region, which  could be secondary to interval debridement.  2.  No acute fracture or joint malalignment.  3.  Advanced thumb CMC degenerative arthrosis.  4.  Bone demineralization.    JANETTE VICTORIA MD         SYSTEM ID:  [id number]   XR Humerus Right G/E 2 Views    Narrative    EXAM: HUMERUS RIGHT TWO OR MORE VIEWS  DATE/TIME: 12/20/2022 4:19 PM     INDICATION: Right proximal humerus fracture.   COMPARISON: 12/6/2022.      Impression    IMPRESSION:  1.  Comminuted fracture of the right humerus surgical neck. There is  new marked impaction of the  "fracture.  2.  Normal glenohumeral joint alignment.  3.  Mild acromioclavicular arthrosis, stable.       JANETTE VICTORIA MD         SYSTEM ID:  [id number]   XR Pelvis w Hip Right 1 View    Narrative    EXAM: PELVIS AND HIP RIGHT ONE VIEW  DATE/TIME: 12/20/2022 4:19 PM     INDICATION: Right hip postoperative follow-up.   COMPARISON: 12/6/2022.      Impression    IMPRESSION:  1.  ORIF right proximal femur fracture with intramedullary nail and  femoral neck screw fixation. The hardware is intact and the fracture  is reduced.  2.  Nondisplaced fracture of the right acetabulum, unchanged.  3.  Normal right hip joint alignment.  4.  Degenerative changes in the lower lumbar spine.  5.  Brachytherapy seeds in the prostate gland.     JANETTE VICTORIA MD         SYSTEM ID:  [id number]       Discharge Medications   Current Discharge Medication List      START taking these medications    Details   amoxicillin-clavulanate (AUGMENTIN) 875-125 MG tablet Take 1 tablet by mouth every 12 hours  Qty: 4 tablet, Refills: 0    Associated Diagnoses: Displaced intertrochanteric fracture of left femur, initial encounter for closed fracture (H); Pneumonia due to infectious organism, unspecified laterality, unspecified part of lung      apixaban ANTICOAGULANT (ELIQUIS) 5 MG tablet Take 1 tablet (5 mg) by mouth 2 times daily for 30 days  Qty: 60 tablet, Refills: 3    Associated Diagnoses: Displaced intertrochanteric fracture of left femur, initial encounter for closed fracture (H); Pneumonia due to infectious organism, unspecified laterality, unspecified part of lung      HYDROmorphone (DILAUDID) 2 MG tablet Take 0.5-1 tablets (1-2 mg) by mouth every 4 hours as needed for moderate to severe pain 1/2 tab po q 4 hrs prn pain scale \"3-5\"  1 tab po q 4 hrs prn pain scale \"6-10\"  Qty: 20 tablet, Refills: 0    Associated Diagnoses: Displaced intertrochanteric fracture of left femur, initial encounter for closed fracture (H); Closed fracture of proximal " end of right humerus, unspecified fracture morphology, initial encounter; Closed dislocation of interphalangeal joint of left thumb, initial encounter      pantoprazole (PROTONIX) 40 MG EC tablet Take 1 tablet (40 mg) by mouth daily for 60 doses  Qty: 30 tablet, Refills: 3    Associated Diagnoses: Displaced intertrochanteric fracture of left femur, initial encounter for closed fracture (H); Pneumonia due to infectious organism, unspecified laterality, unspecified part of lung      senna-docusate (SENOKOT-S/PERICOLACE) 8.6-50 MG tablet Take 1 tablet by mouth 2 times daily as needed for constipation  Qty: 15 tablet    Associated Diagnoses: Displaced intertrochanteric fracture of left femur, initial encounter for closed fracture (H); Closed fracture of proximal end of right humerus, unspecified fracture morphology, initial encounter; Closed dislocation of interphalangeal joint of left thumb, initial encounter      tamsulosin (FLOMAX) 0.4 MG capsule Take 1 capsule (0.4 mg) by mouth daily for 30 days  Qty: 30 capsule, Refills: 11    Associated Diagnoses: Displaced intertrochanteric fracture of left femur, initial encounter for closed fracture (H); Pneumonia due to infectious organism, unspecified laterality, unspecified part of lung      Vitamin D3 (CHOLECALCIFEROL) 25 mcg (1000 units) tablet Take 2 tablets (50 mcg) by mouth daily  Qty: 30 tablet, Refills: 0    Associated Diagnoses: Displaced intertrochanteric fracture of left femur, initial encounter for closed fracture (H); Closed fracture of proximal end of right humerus, unspecified fracture morphology, initial encounter; Closed dislocation of interphalangeal joint of left thumb, initial encounter         CONTINUE these medications which have NOT CHANGED    Details   acetaminophen (TYLENOL) 32 mg/mL liquid Take 160 mg by mouth every 4 hours as needed for fever or mild pain      acetaminophen (TYLENOL) 500 MG tablet Take 1,000 mg by mouth every 6 hours as needed for  mild pain      Calcium Carb-Cholecalciferol 600-20 MG-MCG TABS Take 1 tablet by mouth daily      donepezil (ARICEPT) 5 MG tablet Take 5 mg by mouth At Bedtime      magnesium hydroxide (MILK OF MAGNESIA) 400 MG/5ML suspension Take 30 mLs by mouth daily as needed for constipation or heartburn Give on day 3 of no bm      magnesium oxide (MAG-OX) 400 MG tablet Take 400 mg by mouth daily      Multiple Vitamins-Minerals (MACULAR HEALTH FORMULA PO) Take 14 mg by mouth daily      multivitamin w/minerals (THERA-VIT-M) tablet Take 1 tablet by mouth daily      Omega-3 Fatty Acids (OMEGA 3 PO) Take 3 capsules by mouth daily      sertraline (ZOLOFT) 50 MG tablet Take 50 mg by mouth daily      vitamin C (ASCORBIC ACID) 1000 MG TABS Take 1,000 mg by mouth daily           Allergies   No Known Allergies

## 2022-12-22 ENCOUNTER — LAB REQUISITION (OUTPATIENT)
Dept: LAB | Facility: CLINIC | Age: 87
End: 2022-12-22
Payer: COMMERCIAL

## 2022-12-22 DIAGNOSIS — D64.9 ANEMIA, UNSPECIFIED: ICD-10-CM

## 2022-12-22 PROCEDURE — P9604 ONE-WAY ALLOW PRORATED TRIP: HCPCS | Mod: ORL | Performed by: INTERNAL MEDICINE

## 2022-12-26 ENCOUNTER — LAB REQUISITION (OUTPATIENT)
Dept: LAB | Facility: CLINIC | Age: 87
End: 2022-12-26
Payer: COMMERCIAL

## 2022-12-26 DIAGNOSIS — D64.9 ANEMIA, UNSPECIFIED: ICD-10-CM

## 2022-12-26 LAB
ANION GAP SERPL CALCULATED.3IONS-SCNC: 12 MMOL/L (ref 7–15)
BUN SERPL-MCNC: 12.9 MG/DL (ref 8–23)
CALCIUM SERPL-MCNC: 9 MG/DL (ref 8.8–10.2)
CHLORIDE SERPL-SCNC: 108 MMOL/L (ref 98–107)
CREAT SERPL-MCNC: 0.74 MG/DL (ref 0.67–1.17)
DEPRECATED HCO3 PLAS-SCNC: 21 MMOL/L (ref 22–29)
GFR SERPL CREATININE-BSD FRML MDRD: 88 ML/MIN/1.73M2
GLUCOSE SERPL-MCNC: 75 MG/DL (ref 70–99)
POTASSIUM SERPL-SCNC: 4.4 MMOL/L (ref 3.4–5.3)
SODIUM SERPL-SCNC: 141 MMOL/L (ref 136–145)

## 2022-12-26 PROCEDURE — 80048 BASIC METABOLIC PNL TOTAL CA: CPT | Mod: ORL | Performed by: INTERNAL MEDICINE

## 2022-12-26 PROCEDURE — P9604 ONE-WAY ALLOW PRORATED TRIP: HCPCS | Mod: ORL | Performed by: INTERNAL MEDICINE

## 2022-12-26 PROCEDURE — 36415 COLL VENOUS BLD VENIPUNCTURE: CPT | Mod: ORL | Performed by: INTERNAL MEDICINE

## 2022-12-27 ENCOUNTER — LAB REQUISITION (OUTPATIENT)
Dept: LAB | Facility: CLINIC | Age: 87
End: 2022-12-27
Payer: COMMERCIAL

## 2022-12-27 DIAGNOSIS — Z11.1 ENCOUNTER FOR SCREENING FOR RESPIRATORY TUBERCULOSIS: ICD-10-CM

## 2022-12-27 LAB — HGB BLD-MCNC: 10.1 G/DL (ref 13.3–17.7)

## 2022-12-27 PROCEDURE — 85018 HEMOGLOBIN: CPT | Mod: ORL | Performed by: INTERNAL MEDICINE

## 2022-12-27 PROCEDURE — P9604 ONE-WAY ALLOW PRORATED TRIP: HCPCS | Mod: ORL | Performed by: INTERNAL MEDICINE

## 2022-12-27 PROCEDURE — 36415 COLL VENOUS BLD VENIPUNCTURE: CPT | Mod: ORL | Performed by: INTERNAL MEDICINE

## 2022-12-28 PROCEDURE — 86481 TB AG RESPONSE T-CELL SUSP: CPT | Mod: ORL | Performed by: INTERNAL MEDICINE

## 2022-12-28 PROCEDURE — 36415 COLL VENOUS BLD VENIPUNCTURE: CPT | Mod: ORL | Performed by: INTERNAL MEDICINE

## 2022-12-28 PROCEDURE — P9604 ONE-WAY ALLOW PRORATED TRIP: HCPCS | Mod: ORL | Performed by: INTERNAL MEDICINE

## 2022-12-29 LAB
GAMMA INTERFERON BACKGROUND BLD IA-ACNC: 0.04 IU/ML
M TB IFN-G BLD-IMP: NEGATIVE
M TB IFN-G CD4+ BCKGRND COR BLD-ACNC: 1.82 IU/ML
MITOGEN IGNF BCKGRD COR BLD-ACNC: 0 IU/ML
MITOGEN IGNF BCKGRD COR BLD-ACNC: 0 IU/ML
QUANTIFERON MITOGEN: 1.86 IU/ML
QUANTIFERON NIL TUBE: 0.04 IU/ML
QUANTIFERON TB1 TUBE: 0.04 IU/ML
QUANTIFERON TB2 TUBE: 0.04

## 2023-01-01 ENCOUNTER — HOSPITAL ENCOUNTER (EMERGENCY)
Facility: CLINIC | Age: 88
Discharge: HOME | End: 2023-12-09
Attending: EMERGENCY MEDICINE | Admitting: EMERGENCY MEDICINE
Payer: COMMERCIAL

## 2023-01-01 ENCOUNTER — HOSPITAL ENCOUNTER (EMERGENCY)
Facility: CLINIC | Age: 88
Discharge: NURSING, REHAB OR LONG-TERM CARE | End: 2023-06-09
Attending: EMERGENCY MEDICINE | Admitting: EMERGENCY MEDICINE
Payer: COMMERCIAL

## 2023-01-01 ENCOUNTER — HEALTH MAINTENANCE LETTER (OUTPATIENT)
Age: 88
End: 2023-01-01

## 2023-01-01 ENCOUNTER — APPOINTMENT (OUTPATIENT)
Dept: GENERAL RADIOLOGY | Facility: CLINIC | Age: 88
End: 2023-01-01
Attending: EMERGENCY MEDICINE
Payer: COMMERCIAL

## 2023-01-01 VITALS
TEMPERATURE: 97.6 F | HEART RATE: 74 BPM | SYSTOLIC BLOOD PRESSURE: 147 MMHG | RESPIRATION RATE: 16 BRPM | DIASTOLIC BLOOD PRESSURE: 73 MMHG | OXYGEN SATURATION: 98 %

## 2023-01-01 VITALS
OXYGEN SATURATION: 92 % | SYSTOLIC BLOOD PRESSURE: 167 MMHG | RESPIRATION RATE: 18 BRPM | HEART RATE: 55 BPM | DIASTOLIC BLOOD PRESSURE: 79 MMHG

## 2023-01-01 DIAGNOSIS — T17.908A ASPIRATION INTO AIRWAY, INITIAL ENCOUNTER: ICD-10-CM

## 2023-01-01 DIAGNOSIS — R09.89 CHOKING EPISODE: ICD-10-CM

## 2023-01-01 DIAGNOSIS — U07.1 COVID-19 VIRUS INFECTION: ICD-10-CM

## 2023-01-01 LAB
ANION GAP SERPL CALCULATED.3IONS-SCNC: 7 MMOL/L (ref 7–15)
BASOPHILS # BLD AUTO: 0 10E3/UL (ref 0–0.2)
BASOPHILS NFR BLD AUTO: 0 %
BUN SERPL-MCNC: 14.8 MG/DL (ref 8–23)
CALCIUM SERPL-MCNC: 9.6 MG/DL (ref 8.8–10.2)
CHLORIDE SERPL-SCNC: 100 MMOL/L (ref 98–107)
CREAT SERPL-MCNC: 0.83 MG/DL (ref 0.67–1.17)
DEPRECATED HCO3 PLAS-SCNC: 28 MMOL/L (ref 22–29)
EGFRCR SERPLBLD CKD-EPI 2021: 85 ML/MIN/1.73M2
EOSINOPHIL # BLD AUTO: 0.1 10E3/UL (ref 0–0.7)
EOSINOPHIL NFR BLD AUTO: 1 %
ERYTHROCYTE [DISTWIDTH] IN BLOOD BY AUTOMATED COUNT: 11.9 % (ref 10–15)
FLUAV RNA SPEC QL NAA+PROBE: NEGATIVE
FLUBV RNA RESP QL NAA+PROBE: NEGATIVE
GLUCOSE SERPL-MCNC: 96 MG/DL (ref 70–99)
HCT VFR BLD AUTO: 36 % (ref 40–53)
HGB BLD-MCNC: 11.9 G/DL (ref 13.3–17.7)
IMM GRANULOCYTES # BLD: 0 10E3/UL
IMM GRANULOCYTES NFR BLD: 0 %
LYMPHOCYTES # BLD AUTO: 0.7 10E3/UL (ref 0.8–5.3)
LYMPHOCYTES NFR BLD AUTO: 8 %
MCH RBC QN AUTO: 33.1 PG (ref 26.5–33)
MCHC RBC AUTO-ENTMCNC: 33.1 G/DL (ref 31.5–36.5)
MCV RBC AUTO: 100 FL (ref 78–100)
MONOCYTES # BLD AUTO: 0.5 10E3/UL (ref 0–1.3)
MONOCYTES NFR BLD AUTO: 6 %
NEUTROPHILS # BLD AUTO: 6.8 10E3/UL (ref 1.6–8.3)
NEUTROPHILS NFR BLD AUTO: 85 %
NRBC # BLD AUTO: 0 10E3/UL
NRBC BLD AUTO-RTO: 0 /100
PLATELET # BLD AUTO: 240 10E3/UL (ref 150–450)
POTASSIUM SERPL-SCNC: 4.5 MMOL/L (ref 3.4–5.3)
RBC # BLD AUTO: 3.6 10E6/UL (ref 4.4–5.9)
RSV RNA SPEC NAA+PROBE: NEGATIVE
SARS-COV-2 RNA RESP QL NAA+PROBE: POSITIVE
SODIUM SERPL-SCNC: 135 MMOL/L (ref 135–145)
WBC # BLD AUTO: 8.1 10E3/UL (ref 4–11)

## 2023-01-01 PROCEDURE — 80048 BASIC METABOLIC PNL TOTAL CA: CPT | Performed by: EMERGENCY MEDICINE

## 2023-01-01 PROCEDURE — 71046 X-RAY EXAM CHEST 2 VIEWS: CPT

## 2023-01-01 PROCEDURE — 250N000013 HC RX MED GY IP 250 OP 250 PS 637: Performed by: EMERGENCY MEDICINE

## 2023-01-01 PROCEDURE — 85025 COMPLETE CBC W/AUTO DIFF WBC: CPT | Performed by: EMERGENCY MEDICINE

## 2023-01-01 PROCEDURE — 99284 EMERGENCY DEPT VISIT MOD MDM: CPT | Mod: 25

## 2023-01-01 PROCEDURE — 99283 EMERGENCY DEPT VISIT LOW MDM: CPT | Mod: 25

## 2023-01-01 PROCEDURE — 87637 SARSCOV2&INF A&B&RSV AMP PRB: CPT | Performed by: EMERGENCY MEDICINE

## 2023-01-01 PROCEDURE — 36415 COLL VENOUS BLD VENIPUNCTURE: CPT | Performed by: EMERGENCY MEDICINE

## 2023-01-01 RX ORDER — DIPHENHYDRAMINE HCL 12.5 MG/5ML
25 SOLUTION ORAL ONCE
Status: DISCONTINUED | OUTPATIENT
Start: 2023-01-01 | End: 2023-01-01

## 2023-01-01 RX ADMIN — QUETIAPINE FUMARATE 25 MG: 25 TABLET ORAL at 22:19

## 2023-01-01 ASSESSMENT — ACTIVITIES OF DAILY LIVING (ADL)
ADLS_ACUITY_SCORE: 35

## 2023-01-08 ENCOUNTER — LAB REQUISITION (OUTPATIENT)
Dept: LAB | Facility: CLINIC | Age: 88
End: 2023-01-08
Payer: COMMERCIAL

## 2023-01-08 DIAGNOSIS — D64.9 ANEMIA, UNSPECIFIED: ICD-10-CM

## 2023-01-08 DIAGNOSIS — S72.141D DISPLACED INTERTROCHANTERIC FRACTURE OF RIGHT FEMUR, SUBSEQUENT ENCOUNTER FOR CLOSED FRACTURE WITH ROUTINE HEALING: ICD-10-CM

## 2023-01-10 LAB
ANION GAP SERPL CALCULATED.3IONS-SCNC: 15 MMOL/L (ref 7–15)
BUN SERPL-MCNC: 15.7 MG/DL (ref 8–23)
CALCIUM SERPL-MCNC: 9.3 MG/DL (ref 8.8–10.2)
CHLORIDE SERPL-SCNC: 105 MMOL/L (ref 98–107)
CREAT SERPL-MCNC: 0.82 MG/DL (ref 0.67–1.17)
DEPRECATED HCO3 PLAS-SCNC: 16 MMOL/L (ref 22–29)
ERYTHROCYTE [DISTWIDTH] IN BLOOD BY AUTOMATED COUNT: 14.7 % (ref 10–15)
GFR SERPL CREATININE-BSD FRML MDRD: 86 ML/MIN/1.73M2
GLUCOSE SERPL-MCNC: 117 MG/DL (ref 70–99)
HCT VFR BLD AUTO: 39 % (ref 40–53)
HGB BLD-MCNC: 12 G/DL (ref 13.3–17.7)
MCH RBC QN AUTO: 29.9 PG (ref 26.5–33)
MCHC RBC AUTO-ENTMCNC: 30.8 G/DL (ref 31.5–36.5)
MCV RBC AUTO: 97 FL (ref 78–100)
PLATELET # BLD AUTO: 399 10E3/UL (ref 150–450)
POTASSIUM SERPL-SCNC: 4.5 MMOL/L (ref 3.4–5.3)
RBC # BLD AUTO: 4.01 10E6/UL (ref 4.4–5.9)
SODIUM SERPL-SCNC: 136 MMOL/L (ref 136–145)
WBC # BLD AUTO: 10.2 10E3/UL (ref 4–11)

## 2023-01-10 PROCEDURE — 36415 COLL VENOUS BLD VENIPUNCTURE: CPT | Performed by: INTERNAL MEDICINE

## 2023-01-10 PROCEDURE — 80048 BASIC METABOLIC PNL TOTAL CA: CPT | Performed by: INTERNAL MEDICINE

## 2023-01-10 PROCEDURE — P9604 ONE-WAY ALLOW PRORATED TRIP: HCPCS | Performed by: INTERNAL MEDICINE

## 2023-01-10 PROCEDURE — 85027 COMPLETE CBC AUTOMATED: CPT | Performed by: INTERNAL MEDICINE

## 2023-06-09 NOTE — ED NOTES
Bed: ED33  Expected date: 6/9/23  Expected time: 6:39 PM  Means of arrival: Ambulance  Comments:  Demetrio [phone] [phone] yo patric

## 2023-06-10 NOTE — DISCHARGE INSTRUCTIONS
Please return to the emergency department as needed for new or worsening symptoms including severe shortness of breath, vomiting unable to keep anything down, fainting, any other concerning symptoms.    If choking episodes become more frequent, please follow-up with your primary care provider to discuss utility of a swallow study.

## 2023-06-10 NOTE — ED PROVIDER NOTES
History     Chief Complaint:  Choking       The history is provided by the patient and a caregiver. History limited by: cognitive baseline of patient.      Scott Abbasi is an 87 year old male with a history of Dementia and Parkinson's Disease who presents via EMS from a group home after a choking episode. Per EMS, patient was eating chicken and rice this afternoon when he started choking. Patient was able to cough out the stuck food and ever since has felt as though his breathing is normal. Per caregiver, patient was fine initially after the incident, but began spitting out a thick mucus shortly after. Patient denies currently experiencing any physical symptoms including chest pain, abdominal pain, nausea, or difficulty breathing. He has no personal history of dysphagia or difficulty swallowing.     Independent Historian:   EMS and caregiver at bedside report history as stated above.    Review of External Notes:   See MDM      Medications:    Donepezil   Senna-docusate  Sertraline   Eliquis   Protonix   Klonopin   Seroquel   Trazodone     Past Medical History:   Dementia in Alzheimer's disease  Parkinson's disease    Insomnia  DVT   Bilateral exudative age-related macular degeneration  Anxiety  Senile osteoporosis  Carcinoma of prostate  Phenylketonuria  Severe sleep apnea  Diaphragmatic hernia  Mental retardation    Past Surgical History:    Irrigation and debridement, left finger   Open reduction internal fixation right hip nailing   TURP  Appendectomy   Hernia repair   Retinal detachment repair     Physical Exam     Patient Vitals for the past 24 hrs:   BP Pulse Resp SpO2   06/09/23 2020 (!) 167/79 55 -- 92 %   06/09/23 2000 (!) 167/86 58 -- 99 %   06/09/23 1902 (!) 145/65 61 18 96 %      Physical Exam  Constitutional: Well developed, nontox appearance  Head: Atraumatic.   Mouth/Throat: Oropharynx is clear and moist.   Neck:  no stridor  Eyes: no scleral icterus  Cardiovascular: RRR, 2+ R radial  pulses  Pulmonary/Chest: nml resp effort, Clear BS bilat  Abdominal: ND, soft, NT, no rebound or guarding   Ext: Warm, well perfused, no edema  Neurological: A&O, symmetric facies, moves ext x4  Skin: Skin is warm and dry.   Psychiatric: Behavior is normal. Thought content normal.   Nursing note and vitals reviewed.    Emergency Department Course     Imaging:  Chest XR,  PA & LAT   Final Result   IMPRESSION: No pleural fluid or pneumothorax. No confluent airspace disease. The cardiomediastinal silhouette is at the upper limits of normal in size. There are suspected degenerative changes at the right shoulder; cannot assess for fractures on this    study. There is bony fusion of the included spine.          Report per radiology    Emergency Department Course & Assessments:     Assessments:   I obtained history and examined the patient as noted above.    I rechecked and updated the patient. At this point I feel that the patient is safe for discharge, and the patient agrees.     Independent Interpretation (X-rays, CTs, rhythm strip):  See MDM     Consultations/Discussion of Management or Tests:  None        Social Determinants of Health affecting care:   See MDM     Disposition:  The patient was discharged to home.     Impression & Plan      Medical Decision Makin year old male presenting w/ choking episode prior to arrival, no complaints currently     Social determinants affecting patient's health include:  Age, lives in a facility potentially increasing risk for poor health outcomes and presentation to the emergency department     I reviewed medical records from  emergency room visit on 2023 for an overview of the patient's recent medical complaints     DDx includes aspiration, choking episode, aspiration pneumonitis.  Doubt pneumothorax, community-acquired pneumonia, CHF, ACS given history and physical exam.  Vital signs within normal limits the patient is not hypoxic.  Chest x-ray significant for  no pneumothoraces upon my independent interpretation, radiology read as noted above.  Given reassuring vital signs and patient has no complaints currently, I feel he is safe for discharge and strict return precautions for re-presentation to the emergency department.  At this time I feel the pt is safe for discharge.  Recommendations given regarding follow up with PCP and return to the emergency department as needed for new or worsening symptoms.  Pt's caregiver and pt counseled on all results, disposition and diagnosis.  Written instructions provided as well.  They are understanding and agreeable to plan. Patient discharged in stable condition.      Diagnosis:    ICD-10-CM    1. Choking episode  R09.89       2. Aspiration into airway, initial encounter  T17.908A         Discharge Orders:   QUEtiapine (SEROquel) half-tab 25 mg    Scribe Disclosure:  I, TOÑA FELICIANO, am serving as a scribe at 7:04 PM on 6/9/2023 to document services personally performed by Catrachito Carlson MD based on my observations and the provider's statements to me.     6/9/2023   Catrachito Carlson MD Vaughn, Christopher E, MD  06/09/23 [id number]

## 2023-06-10 NOTE — ED TRIAGE NOTES
Pt comes from group home, was eating chicken and rice an hour PTA and began choking on it. Staff states pt was able to clear it himself but vomited a few times afterward. Lung sounds per EMS: initially diminished but clear upon arrival. Pt with hx of dementia. ABC's intact, alert, no distress noted.

## 2023-12-09 NOTE — ED PROVIDER NOTES
History     Chief Complaint:  Cough       HPI   Scott Abbasi is a 88 year old male who presents to the ED for evaluation of an episode of choking.  The patient has normal medications but are crushed in applesauce which he usually takes.  He was able to swallow them well.  Short time later he began coughing.  He did not have a witnessed aspiration event.  He was here in June with a similar episode.  No recent fever or ill contacts or other congestion.  The patient denies any pain right now.  No cyanosis or apnea.      Independent Historian:    The patient's group home caretaker is present with him and provides history.    Review of External Notes:  Office visit reviewed from November 9, 2023 when the patient was seen in follow-up of neurocognitive disorder.    Medications:    nirmatrelvir and ritonavir (PAXLOVID) 300 mg/100 mg therapy pack  acetaminophen (TYLENOL) 32 mg/mL liquid  acetaminophen (TYLENOL) 500 MG tablet  amoxicillin-clavulanate (AUGMENTIN) 875-125 MG tablet  Calcium Carb-Cholecalciferol 600-20 MG-MCG TABS  donepezil (ARICEPT) 5 MG tablet  HYDROmorphone (DILAUDID) 2 MG tablet  magnesium hydroxide (MILK OF MAGNESIA) 400 MG/5ML suspension  magnesium oxide (MAG-OX) 400 MG tablet  Multiple Vitamins-Minerals (MACULAR HEALTH FORMULA PO)  multivitamin w/minerals (THERA-VIT-M) tablet  Omega-3 Fatty Acids (OMEGA 3 PO)  senna-docusate (SENOKOT-S/PERICOLACE) 8.6-50 MG tablet  sertraline (ZOLOFT) 50 MG tablet  vitamin C (ASCORBIC ACID) 1000 MG TABS  Vitamin D3 (CHOLECALCIFEROL) 25 mcg (1000 units) tablet        Past Medical History:    Past Medical History:   Diagnosis Date    Dementia (H)     Parkinson's disease (H)        Past Surgical History:    Past Surgical History:   Procedure Laterality Date    ESOPHAGOSCOPY, GASTROSCOPY, DUODENOSCOPY (EGD), COMBINED N/A 12/18/2022    Procedure: ESOPHAGOGASTRODUODENOSCOPY (EGD);  Surgeon: Dani Morel DO;  Location:  GI    IRRIGATION AND DEBRIDEMENT FINGER,  COMBINED Left 12/7/2022    Procedure: Left thumb distal interphalangeal joint irrigation and excisional debridement;  Surgeon: Carlos Horowitz MD;  Location: [facility] OR    OPEN REDUCTION INTERNAL FIXATION HIP NAILING Right 12/7/2022    Procedure: Surgical treatment of right intertrochanteric femur fracture with cephalomedullary device ;  Surgeon: Carlos Horowitz MD;  Location: [facility] OR          Physical Exam   Patient Vitals for the past 24 hrs:   BP Temp Temp src Pulse Resp SpO2   12/09/23 1421 (!) 147/73 -- -- 74 16 98 %   12/09/23 1206 -- -- -- -- -- 98 %   12/09/23 1203 -- -- -- -- -- 98 %   12/09/23 1202 -- -- -- -- -- 97 %   12/09/23 1137 (!) 152/73 97.6  F (36.4  C) Temporal 56 22 99 %        Physical Exam  Constitutional:       General: He is not in acute distress.     Appearance: Normal appearance. He is not toxic-appearing.   HENT:      Head: Atraumatic.      Right Ear: External ear normal.      Left Ear: External ear normal.   Eyes:      General: No scleral icterus.     Conjunctiva/sclera: Conjunctivae normal.   Cardiovascular:      Rate and Rhythm: Normal rate and regular rhythm.      Heart sounds: Normal heart sounds.   Pulmonary:      Effort: Pulmonary effort is normal. No respiratory distress.      Breath sounds: Normal breath sounds.   Abdominal:      Palpations: Abdomen is soft.      Tenderness: There is no abdominal tenderness.   Musculoskeletal:         General: No deformity.      Cervical back: Neck supple.      Right lower leg: No edema.      Left lower leg: No edema.   Skin:     General: Skin is warm.      Capillary Refill: Capillary refill takes less than 2 seconds.   Neurological:      General: No focal deficit present.      Mental Status: He is alert and oriented to person, place, and time.   Psychiatric:         Mood and Affect: Mood normal.         Behavior: Behavior normal.           Emergency Department Course     Imaging:  XR Chest 2 Views   Final Result   IMPRESSION: No acute  cardiopulmonary abnormality. Chronic right humeral fracture. Degenerative changes in the spine.        Report per radiology    Laboratory:  Labs Ordered and Resulted from Time of ED Arrival to Time of ED Departure   INFLUENZA A/B, RSV, & SARS-COV2 PCR - Abnormal       Result Value    Influenza A PCR Negative      Influenza B PCR Negative      RSV PCR Negative      SARS CoV2 PCR Positive (*)    CBC WITH PLATELETS AND DIFFERENTIAL - Abnormal    WBC Count 8.1      RBC Count 3.60 (*)     Hemoglobin 11.9 (*)     Hematocrit 36.0 (*)           MCH 33.1 (*)     MCHC 33.1      RDW 11.9      Platelet Count 240      % Neutrophils 85      % Lymphocytes 8      % Monocytes 6      % Eosinophils 1      % Basophils 0      % Immature Granulocytes 0      NRBCs per 100 WBC 0      Absolute Neutrophils 6.8      Absolute Lymphocytes 0.7 (*)     Absolute Monocytes 0.5      Absolute Eosinophils 0.1      Absolute Basophils 0.0      Absolute Immature Granulocytes 0.0      Absolute NRBCs 0.0     BASIC METABOLIC PANEL - Normal    Sodium 135      Potassium 4.5      Chloride 100      Carbon Dioxide (CO2) 28      Anion Gap 7      Urea Nitrogen 14.8      Creatinine 0.83      GFR Estimate 85      Calcium 9.6      Glucose 96          Emergency Department Course & Assessments:           Independent Interpretation (X-rays, CTs, rhythm strip):  Chest x-ray independently interpreted.  No infiltrate.     Disposition:  The patient was discharged to home.     Impression & Plan      Medical Decision Making:  This patient is an 88-year-old who presents to the emergency department for evaluation of a cough.  He is here with his caretaker who felt that there could have been some aspiration after eating pills with applesauce.  The pills were crushed.  He did not have any coughing immediately but rather was at least 10 to 15 minutes later.  He is no longer coughing here although he was clearing his throat on arrival.  Chest x-ray does not show any evidence  of aspiration.  Lab workup looks good.  He is positive for COVID.  This likely is the first day of his symptoms.  I discussed with his caretaker potentially using Paxlovid.  We discussed potential side effects.  They are interested in this and he does not have any medications that should present a significant interaction.    The patient will return if worse but otherwise is appropriate for outpatient follow-up.        Diagnosis:    ICD-10-CM    1. Choking episode  R09.89       2. COVID-19 virus infection  U07.1            Discharge Medications:  Discharge Medication List as of 12/9/2023  2:10 PM        START taking these medications    Details   nirmatrelvir and ritonavir (PAXLOVID) 300 mg/100 mg therapy pack Take 3 tablets by mouth 2 times daily for 5 days, Disp-30 tablet, R-0, E-PrescribeDate of symptom onset: 12/09/2023; Risk criteria met: Yes; Weight >40 kg Yes; Renal fxn: normal;  Drug-Drug interactions reviewed & addressed: Yes                  12/9/2023   Renato Rea MD McRoberts, Sean Edward, MD  12/09/23 [id number]

## 2023-12-09 NOTE — ED TRIAGE NOTES
Pt arrives with manager of , pt started having a cough this morning that is productive, pt had applesauce with meds this morning and  manager believes this started after. PT VSS and ABC's intact

## 2023-12-09 NOTE — DISCHARGE INSTRUCTIONS
You should wear a mask and isolate in your room for 1 week.    Return to the ER for any new or worsening symptoms.

## 2024-01-01 ENCOUNTER — HOSPITAL ENCOUNTER (OUTPATIENT)
Facility: CLINIC | Age: 89
Setting detail: OBSERVATION
End: 2024-03-26
Attending: EMERGENCY MEDICINE | Admitting: INTERNAL MEDICINE
Payer: COMMERCIAL

## 2024-01-01 ENCOUNTER — MEDICAL CORRESPONDENCE (OUTPATIENT)
Dept: HEALTH INFORMATION MANAGEMENT | Facility: CLINIC | Age: 89
End: 2024-01-01
Payer: COMMERCIAL

## 2024-01-01 VITALS
DIASTOLIC BLOOD PRESSURE: 59 MMHG | SYSTOLIC BLOOD PRESSURE: 98 MMHG | TEMPERATURE: 102.4 F | HEART RATE: 109 BPM | RESPIRATION RATE: 40 BRPM | OXYGEN SATURATION: 77 %

## 2024-01-01 DIAGNOSIS — J96.01 ACUTE RESPIRATORY FAILURE WITH HYPOXIA (H): ICD-10-CM

## 2024-01-01 DIAGNOSIS — Z86.59 HISTORY OF DEMENTIA: ICD-10-CM

## 2024-01-01 DIAGNOSIS — R50.9 FEVER IN ADULT: ICD-10-CM

## 2024-01-01 PROCEDURE — 96376 TX/PRO/DX INJ SAME DRUG ADON: CPT

## 2024-01-01 PROCEDURE — 99239 HOSP IP/OBS DSCHRG MGMT >30: CPT | Performed by: INTERNAL MEDICINE

## 2024-01-01 PROCEDURE — 96375 TX/PRO/DX INJ NEW DRUG ADDON: CPT

## 2024-01-01 PROCEDURE — 250N000011 HC RX IP 250 OP 636: Performed by: INTERNAL MEDICINE

## 2024-01-01 PROCEDURE — 250N000011 HC RX IP 250 OP 636: Performed by: EMERGENCY MEDICINE

## 2024-01-01 PROCEDURE — 96374 THER/PROPH/DIAG INJ IV PUSH: CPT

## 2024-01-01 PROCEDURE — 99207 PR NO BILLABLE SERVICE THIS VISIT: CPT | Performed by: INTERNAL MEDICINE

## 2024-01-01 PROCEDURE — 99223 1ST HOSP IP/OBS HIGH 75: CPT | Performed by: INTERNAL MEDICINE

## 2024-01-01 PROCEDURE — G0378 HOSPITAL OBSERVATION PER HR: HCPCS

## 2024-01-01 PROCEDURE — 99232 SBSQ HOSP IP/OBS MODERATE 35: CPT | Performed by: STUDENT IN AN ORGANIZED HEALTH CARE EDUCATION/TRAINING PROGRAM

## 2024-01-01 PROCEDURE — 99207 PR APP CREDIT; MD BILLING SHARED VISIT: CPT | Performed by: STUDENT IN AN ORGANIZED HEALTH CARE EDUCATION/TRAINING PROGRAM

## 2024-01-01 PROCEDURE — 250N000013 HC RX MED GY IP 250 OP 250 PS 637: Performed by: INTERNAL MEDICINE

## 2024-01-01 PROCEDURE — 250N000009 HC RX 250: Performed by: INTERNAL MEDICINE

## 2024-01-01 PROCEDURE — 999N000157 HC STATISTIC RCP TIME EA 10 MIN

## 2024-01-01 PROCEDURE — 99285 EMERGENCY DEPT VISIT HI MDM: CPT | Mod: 25

## 2024-01-01 PROCEDURE — 94660 CPAP INITIATION&MGMT: CPT

## 2024-01-01 RX ORDER — ONDANSETRON 2 MG/ML
4 INJECTION INTRAMUSCULAR; INTRAVENOUS EVERY 6 HOURS PRN
Status: DISCONTINUED | OUTPATIENT
Start: 2024-01-01 | End: 2024-01-01 | Stop reason: HOSPADM

## 2024-01-01 RX ORDER — ACETAMINOPHEN 650 MG/1
650 SUPPOSITORY RECTAL EVERY 6 HOURS PRN
Status: DISCONTINUED | OUTPATIENT
Start: 2024-01-01 | End: 2024-01-01 | Stop reason: HOSPADM

## 2024-01-01 RX ORDER — NALOXONE HYDROCHLORIDE 0.4 MG/ML
0.2 INJECTION, SOLUTION INTRAMUSCULAR; INTRAVENOUS; SUBCUTANEOUS
Status: DISCONTINUED | OUTPATIENT
Start: 2024-01-01 | End: 2024-01-01 | Stop reason: HOSPADM

## 2024-01-01 RX ORDER — HYDROMORPHONE HCL IN WATER/PF 6 MG/30 ML
0.4 PATIENT CONTROLLED ANALGESIA SYRINGE INTRAVENOUS EVERY 6 HOURS SCHEDULED
Status: DISCONTINUED | OUTPATIENT
Start: 2024-01-01 | End: 2024-01-01 | Stop reason: HOSPADM

## 2024-01-01 RX ORDER — OLANZAPINE 5 MG/1
5 TABLET ORAL AT BEDTIME
COMMUNITY
Start: 2024-01-01

## 2024-01-01 RX ORDER — HYDROMORPHONE HYDROCHLORIDE 1 MG/ML
1 SOLUTION ORAL
Status: DISCONTINUED | OUTPATIENT
Start: 2024-01-01 | End: 2024-01-01

## 2024-01-01 RX ORDER — HYDROMORPHONE HYDROCHLORIDE 1 MG/ML
0.3 INJECTION, SOLUTION INTRAMUSCULAR; INTRAVENOUS; SUBCUTANEOUS
Status: DISCONTINUED | OUTPATIENT
Start: 2024-01-01 | End: 2024-01-01 | Stop reason: HOSPADM

## 2024-01-01 RX ORDER — CLONAZEPAM 1 MG/1
1 TABLET ORAL AT BEDTIME
COMMUNITY
Start: 2024-01-01

## 2024-01-01 RX ORDER — SENNOSIDES 8.6 MG
1 TABLET ORAL 2 TIMES DAILY PRN
Status: DISCONTINUED | OUTPATIENT
Start: 2024-01-01 | End: 2024-01-01 | Stop reason: HOSPADM

## 2024-01-01 RX ORDER — MEMANTINE HYDROCHLORIDE 10 MG/1
10 TABLET ORAL 2 TIMES DAILY
COMMUNITY

## 2024-01-01 RX ORDER — HYDROMORPHONE HYDROCHLORIDE 2 MG/1
2 TABLET ORAL
Status: DISCONTINUED | OUTPATIENT
Start: 2024-01-01 | End: 2024-01-01

## 2024-01-01 RX ORDER — LORAZEPAM 2 MG/ML
1 INJECTION INTRAMUSCULAR
Status: DISCONTINUED | OUTPATIENT
Start: 2024-01-01 | End: 2024-01-01 | Stop reason: HOSPADM

## 2024-01-01 RX ORDER — FENTANYL CITRATE 50 UG/ML
100 INJECTION, SOLUTION INTRAMUSCULAR; INTRAVENOUS ONCE
Status: COMPLETED | OUTPATIENT
Start: 2024-01-01 | End: 2024-01-01

## 2024-01-01 RX ORDER — GLYCOPYRROLATE 0.2 MG/ML
0.2 INJECTION, SOLUTION INTRAMUSCULAR; INTRAVENOUS 3 TIMES DAILY
Status: DISCONTINUED | OUTPATIENT
Start: 2024-01-01 | End: 2024-01-01 | Stop reason: HOSPADM

## 2024-01-01 RX ORDER — PROCHLORPERAZINE 25 MG
12.5 SUPPOSITORY, RECTAL RECTAL EVERY 12 HOURS PRN
Status: DISCONTINUED | OUTPATIENT
Start: 2024-01-01 | End: 2024-01-01 | Stop reason: HOSPADM

## 2024-01-01 RX ORDER — ONDANSETRON 4 MG/1
4 TABLET, ORALLY DISINTEGRATING ORAL EVERY 6 HOURS PRN
Status: DISCONTINUED | OUTPATIENT
Start: 2024-01-01 | End: 2024-01-01 | Stop reason: HOSPADM

## 2024-01-01 RX ORDER — ACETAMINOPHEN 500 MG
1000 TABLET ORAL 3 TIMES DAILY
COMMUNITY

## 2024-01-01 RX ORDER — HYDROMORPHONE HYDROCHLORIDE 1 MG/ML
0.5 INJECTION, SOLUTION INTRAMUSCULAR; INTRAVENOUS; SUBCUTANEOUS
Status: DISCONTINUED | OUTPATIENT
Start: 2024-01-01 | End: 2024-01-01

## 2024-01-01 RX ORDER — CARBOXYMETHYLCELLULOSE SODIUM 5 MG/ML
1-2 SOLUTION/ DROPS OPHTHALMIC
Status: DISCONTINUED | OUTPATIENT
Start: 2024-01-01 | End: 2024-01-01 | Stop reason: HOSPADM

## 2024-01-01 RX ORDER — LORAZEPAM 2 MG/ML
1 INJECTION INTRAMUSCULAR EVERY 30 MIN PRN
Status: DISCONTINUED | OUTPATIENT
Start: 2024-01-01 | End: 2024-01-01

## 2024-01-01 RX ORDER — QUETIAPINE FUMARATE 50 MG/1
75 TABLET, FILM COATED ORAL EVERY EVENING
COMMUNITY
Start: 2024-01-01

## 2024-01-01 RX ORDER — ACETAMINOPHEN 325 MG/1
650 TABLET ORAL EVERY 6 HOURS PRN
Status: DISCONTINUED | OUTPATIENT
Start: 2024-01-01 | End: 2024-01-01 | Stop reason: HOSPADM

## 2024-01-01 RX ORDER — HYDROMORPHONE HYDROCHLORIDE 1 MG/ML
2 SOLUTION ORAL
Status: DISCONTINUED | OUTPATIENT
Start: 2024-01-01 | End: 2024-01-01

## 2024-01-01 RX ORDER — AMOXICILLIN 250 MG
1 CAPSULE ORAL 2 TIMES DAILY
COMMUNITY

## 2024-01-01 RX ORDER — GLYCOPYRROLATE 0.2 MG/ML
0.2 INJECTION, SOLUTION INTRAMUSCULAR; INTRAVENOUS EVERY 4 HOURS PRN
Status: DISCONTINUED | OUTPATIENT
Start: 2024-01-01 | End: 2024-01-01

## 2024-01-01 RX ORDER — LORAZEPAM 2 MG/ML
2 INJECTION INTRAMUSCULAR ONCE
Status: COMPLETED | OUTPATIENT
Start: 2024-01-01 | End: 2024-01-01

## 2024-01-01 RX ORDER — HALOPERIDOL 5 MG/ML
2 INJECTION INTRAMUSCULAR
Status: DISCONTINUED | OUTPATIENT
Start: 2024-01-01 | End: 2024-01-01 | Stop reason: HOSPADM

## 2024-01-01 RX ORDER — NALOXONE HYDROCHLORIDE 0.4 MG/ML
0.1 INJECTION, SOLUTION INTRAMUSCULAR; INTRAVENOUS; SUBCUTANEOUS
Status: DISCONTINUED | OUTPATIENT
Start: 2024-01-01 | End: 2024-01-01 | Stop reason: HOSPADM

## 2024-01-01 RX ORDER — BISACODYL 10 MG
10 SUPPOSITORY, RECTAL RECTAL
Status: DISCONTINUED | OUTPATIENT
Start: 2024-03-27 | End: 2024-01-01 | Stop reason: HOSPADM

## 2024-01-01 RX ORDER — POLYETHYLENE GLYCOL 3350 17 G/17G
17 POWDER, FOR SOLUTION ORAL 2 TIMES DAILY
COMMUNITY
Start: 2023-01-01

## 2024-01-01 RX ORDER — PANTOPRAZOLE SODIUM 40 MG/1
40 TABLET, DELAYED RELEASE ORAL DAILY
COMMUNITY
Start: 2023-02-22

## 2024-01-01 RX ORDER — PROCHLORPERAZINE MALEATE 5 MG
5 TABLET ORAL EVERY 6 HOURS PRN
Status: DISCONTINUED | OUTPATIENT
Start: 2024-01-01 | End: 2024-01-01 | Stop reason: HOSPADM

## 2024-01-01 RX ORDER — ATROPINE SULFATE 10 MG/ML
2 SOLUTION/ DROPS OPHTHALMIC EVERY 4 HOURS PRN
Status: DISCONTINUED | OUTPATIENT
Start: 2024-01-01 | End: 2024-01-01 | Stop reason: HOSPADM

## 2024-01-01 RX ORDER — HYDROMORPHONE HCL IN WATER/PF 6 MG/30 ML
0.4 PATIENT CONTROLLED ANALGESIA SYRINGE INTRAVENOUS
Status: DISCONTINUED | OUTPATIENT
Start: 2024-01-01 | End: 2024-01-01 | Stop reason: HOSPADM

## 2024-01-01 RX ORDER — LORAZEPAM 1 MG/1
1 TABLET ORAL
Status: DISCONTINUED | OUTPATIENT
Start: 2024-01-01 | End: 2024-01-01 | Stop reason: HOSPADM

## 2024-01-01 RX ORDER — MINERAL OIL/HYDROPHIL PETROLAT
OINTMENT (GRAM) TOPICAL
Status: DISCONTINUED | OUTPATIENT
Start: 2024-01-01 | End: 2024-01-01 | Stop reason: HOSPADM

## 2024-01-01 RX ORDER — FENTANYL CITRATE 50 UG/ML
100 INJECTION, SOLUTION INTRAMUSCULAR; INTRAVENOUS EVERY 30 MIN PRN
Status: DISCONTINUED | OUTPATIENT
Start: 2024-01-01 | End: 2024-01-01

## 2024-01-01 RX ADMIN — HYDROMORPHONE HYDROCHLORIDE 0.5 MG: 1 INJECTION, SOLUTION INTRAMUSCULAR; INTRAVENOUS; SUBCUTANEOUS at 06:37

## 2024-01-01 RX ADMIN — HYDROMORPHONE HYDROCHLORIDE 2 MG: 5 SOLUTION ORAL at 04:22

## 2024-01-01 RX ADMIN — HYDROMORPHONE HYDROCHLORIDE 0.5 MG: 1 INJECTION, SOLUTION INTRAMUSCULAR; INTRAVENOUS; SUBCUTANEOUS at 03:07

## 2024-01-01 RX ADMIN — ATROPINE SULFATE 2 DROP: 10 SOLUTION/ DROPS OPHTHALMIC at 21:02

## 2024-01-01 RX ADMIN — HYDROMORPHONE HYDROCHLORIDE 0.5 MG: 1 INJECTION, SOLUTION INTRAMUSCULAR; INTRAVENOUS; SUBCUTANEOUS at 09:06

## 2024-01-01 RX ADMIN — LORAZEPAM 1 MG: 2 INJECTION, SOLUTION INTRAMUSCULAR; INTRAVENOUS at 01:04

## 2024-01-01 RX ADMIN — HYDROMORPHONE HYDROCHLORIDE 0.5 MG: 1 INJECTION, SOLUTION INTRAMUSCULAR; INTRAVENOUS; SUBCUTANEOUS at 22:20

## 2024-01-01 RX ADMIN — HYDROMORPHONE HYDROCHLORIDE 0.3 MG: 1 INJECTION, SOLUTION INTRAMUSCULAR; INTRAVENOUS; SUBCUTANEOUS at 07:54

## 2024-01-01 RX ADMIN — ATROPINE SULFATE 2 DROP: 10 SOLUTION/ DROPS OPHTHALMIC at 00:27

## 2024-01-01 RX ADMIN — HYDROMORPHONE HYDROCHLORIDE 0.5 MG: 1 INJECTION, SOLUTION INTRAMUSCULAR; INTRAVENOUS; SUBCUTANEOUS at 17:05

## 2024-01-01 RX ADMIN — HYDROMORPHONE HYDROCHLORIDE 2 MG: 5 SOLUTION ORAL at 02:05

## 2024-01-01 RX ADMIN — HYDROMORPHONE HYDROCHLORIDE 0.5 MG: 1 INJECTION, SOLUTION INTRAMUSCULAR; INTRAVENOUS; SUBCUTANEOUS at 15:47

## 2024-01-01 RX ADMIN — FENTANYL CITRATE 100 MCG: 0.05 INJECTION, SOLUTION INTRAMUSCULAR; INTRAVENOUS at 23:57

## 2024-01-01 RX ADMIN — HYDROMORPHONE HYDROCHLORIDE 0.5 MG: 1 INJECTION, SOLUTION INTRAMUSCULAR; INTRAVENOUS; SUBCUTANEOUS at 01:50

## 2024-01-01 RX ADMIN — HYDROMORPHONE HYDROCHLORIDE 0.4 MG: 0.2 INJECTION, SOLUTION INTRAMUSCULAR; INTRAVENOUS; SUBCUTANEOUS at 10:47

## 2024-01-01 RX ADMIN — HYDROMORPHONE HYDROCHLORIDE 0.5 MG: 1 INJECTION, SOLUTION INTRAMUSCULAR; INTRAVENOUS; SUBCUTANEOUS at 00:04

## 2024-01-01 RX ADMIN — GLYCOPYRROLATE 0.2 MG: 0.2 INJECTION INTRAMUSCULAR; INTRAVENOUS at 23:29

## 2024-01-01 RX ADMIN — GLYCOPYRROLATE 0.2 MG: 0.2 INJECTION INTRAMUSCULAR; INTRAVENOUS at 06:41

## 2024-01-01 RX ADMIN — HYDROMORPHONE HYDROCHLORIDE 0.5 MG: 1 INJECTION, SOLUTION INTRAMUSCULAR; INTRAVENOUS; SUBCUTANEOUS at 20:57

## 2024-01-01 RX ADMIN — ATROPINE SULFATE 2 DROP: 10 SOLUTION/ DROPS OPHTHALMIC at 18:14

## 2024-01-01 RX ADMIN — ACETAMINOPHEN 650 MG: 650 SUPPOSITORY RECTAL at 19:04

## 2024-01-01 RX ADMIN — HYDROMORPHONE HYDROCHLORIDE 0.5 MG: 1 INJECTION, SOLUTION INTRAMUSCULAR; INTRAVENOUS; SUBCUTANEOUS at 00:15

## 2024-01-01 RX ADMIN — GLYCOPYRROLATE 0.2 MG: 0.2 INJECTION INTRAMUSCULAR; INTRAVENOUS at 07:21

## 2024-01-01 RX ADMIN — HYDROMORPHONE HYDROCHLORIDE 2 MG: 5 SOLUTION ORAL at 20:51

## 2024-01-01 RX ADMIN — GLYCOPYRROLATE 0.2 MG: 0.2 INJECTION INTRAMUSCULAR; INTRAVENOUS at 16:02

## 2024-01-01 RX ADMIN — LORAZEPAM 1 MG: 2 INJECTION, SOLUTION INTRAMUSCULAR; INTRAVENOUS at 01:16

## 2024-01-01 RX ADMIN — HYDROMORPHONE HYDROCHLORIDE 0.5 MG: 1 INJECTION, SOLUTION INTRAMUSCULAR; INTRAVENOUS; SUBCUTANEOUS at 18:48

## 2024-01-01 RX ADMIN — HYDROMORPHONE HYDROCHLORIDE 0.5 MG: 1 INJECTION, SOLUTION INTRAMUSCULAR; INTRAVENOUS; SUBCUTANEOUS at 06:14

## 2024-01-01 RX ADMIN — HYDROMORPHONE HYDROCHLORIDE 2 MG: 5 SOLUTION ORAL at 23:23

## 2024-01-01 RX ADMIN — LORAZEPAM 1 MG: 2 INJECTION, SOLUTION INTRAMUSCULAR; INTRAVENOUS at 11:06

## 2024-01-01 RX ADMIN — FENTANYL CITRATE 100 MCG: 50 INJECTION, SOLUTION INTRAMUSCULAR; INTRAVENOUS at 02:42

## 2024-01-01 RX ADMIN — LORAZEPAM 1 MG: 2 INJECTION, SOLUTION INTRAMUSCULAR; INTRAVENOUS at 06:49

## 2024-01-01 RX ADMIN — LORAZEPAM 1 MG: 1 TABLET ORAL at 21:53

## 2024-01-01 RX ADMIN — ACETAMINOPHEN 650 MG: 650 SUPPOSITORY RECTAL at 02:03

## 2024-01-01 RX ADMIN — LORAZEPAM 1 MG: 2 INJECTION, SOLUTION INTRAMUSCULAR; INTRAVENOUS at 05:15

## 2024-01-01 RX ADMIN — HYDROMORPHONE HYDROCHLORIDE 0.5 MG: 1 INJECTION, SOLUTION INTRAMUSCULAR; INTRAVENOUS; SUBCUTANEOUS at 09:17

## 2024-01-01 RX ADMIN — HYDROMORPHONE HYDROCHLORIDE 0.5 MG: 1 INJECTION, SOLUTION INTRAMUSCULAR; INTRAVENOUS; SUBCUTANEOUS at 08:51

## 2024-01-01 RX ADMIN — GLYCOPYRROLATE 0.2 MG: 0.2 INJECTION INTRAMUSCULAR; INTRAVENOUS at 09:17

## 2024-01-01 RX ADMIN — HYDROMORPHONE HYDROCHLORIDE 0.5 MG: 1 INJECTION, SOLUTION INTRAMUSCULAR; INTRAVENOUS; SUBCUTANEOUS at 16:01

## 2024-01-01 RX ADMIN — GLYCOPYRROLATE 0.2 MG: 0.2 INJECTION INTRAMUSCULAR; INTRAVENOUS at 03:42

## 2024-01-01 RX ADMIN — ATROPINE SULFATE 2 DROP: 10 SOLUTION/ DROPS OPHTHALMIC at 05:15

## 2024-01-01 RX ADMIN — HYDROMORPHONE HYDROCHLORIDE 2 MG: 5 SOLUTION ORAL at 07:21

## 2024-01-01 RX ADMIN — ACETAMINOPHEN 650 MG: 650 SUPPOSITORY RECTAL at 01:51

## 2024-01-01 RX ADMIN — HYDROMORPHONE HYDROCHLORIDE 0.5 MG: 1 INJECTION, SOLUTION INTRAMUSCULAR; INTRAVENOUS; SUBCUTANEOUS at 18:15

## 2024-01-01 RX ADMIN — LORAZEPAM 1 MG: 2 INJECTION, SOLUTION INTRAMUSCULAR; INTRAVENOUS at 02:10

## 2024-01-01 RX ADMIN — LORAZEPAM 2 MG: 2 INJECTION, SOLUTION INTRAMUSCULAR; INTRAVENOUS at 00:01

## 2024-01-01 ASSESSMENT — ACTIVITIES OF DAILY LIVING (ADL)
ADLS_ACUITY_SCORE: 53
ADLS_ACUITY_SCORE: 41
ADLS_ACUITY_SCORE: 53
ADLS_ACUITY_SCORE: 41
ADLS_ACUITY_SCORE: 49
ADLS_ACUITY_SCORE: 41
ADLS_ACUITY_SCORE: 39
ADLS_ACUITY_SCORE: 53
ADLS_ACUITY_SCORE: 41
ADLS_ACUITY_SCORE: 55
ADLS_ACUITY_SCORE: 41
ADLS_ACUITY_SCORE: 55
ADLS_ACUITY_SCORE: 53
ADLS_ACUITY_SCORE: 41
ADLS_ACUITY_SCORE: 41
ADLS_ACUITY_SCORE: 53
ADLS_ACUITY_SCORE: 53
ADLS_ACUITY_SCORE: 41
ADLS_ACUITY_SCORE: 53
ADLS_ACUITY_SCORE: 37
ADLS_ACUITY_SCORE: 53
ADLS_ACUITY_SCORE: 41
ADLS_ACUITY_SCORE: 53
ADLS_ACUITY_SCORE: 55
ADLS_ACUITY_SCORE: 41
ADLS_ACUITY_SCORE: 39
ADLS_ACUITY_SCORE: 41
ADLS_ACUITY_SCORE: 57
ADLS_ACUITY_SCORE: 53
ADLS_ACUITY_SCORE: 41
ADLS_ACUITY_SCORE: 53
ADLS_ACUITY_SCORE: 41
ADLS_ACUITY_SCORE: 53
ADLS_ACUITY_SCORE: 41
ADLS_ACUITY_SCORE: 39
ADLS_ACUITY_SCORE: 53
ADLS_ACUITY_SCORE: 53
ADLS_ACUITY_SCORE: 41
ADLS_ACUITY_SCORE: 53
ADLS_ACUITY_SCORE: 53
ADLS_ACUITY_SCORE: 39
ADLS_ACUITY_SCORE: 49
ADLS_ACUITY_SCORE: 53
ADLS_ACUITY_SCORE: 41
ADLS_ACUITY_SCORE: 55
ADLS_ACUITY_SCORE: 55
ADLS_ACUITY_SCORE: 53
ADLS_ACUITY_SCORE: 39

## 2024-01-01 ASSESSMENT — COLUMBIA-SUICIDE SEVERITY RATING SCALE - C-SSRS: IS THE PATIENT NOT ABLE TO COMPLETE C-SSRS: UNABLE TO VERBALIZE

## 2024-03-24 PROBLEM — Z86.59 HISTORY OF DEMENTIA: Status: ACTIVE | Noted: 2024-01-01

## 2024-03-24 PROBLEM — R50.9 FEVER IN ADULT: Status: ACTIVE | Noted: 2024-01-01

## 2024-03-24 PROBLEM — J96.01 ACUTE RESPIRATORY FAILURE WITH HYPOXIA (H): Status: ACTIVE | Noted: 2024-01-01

## 2024-03-24 NOTE — CONSULTS
Care Management Initial Consult    General Information  Assessment completed with: Family, Irina- talked with Irina on the phone  She was here all night, lives in [place]. Plans to be at the Hospital Monday morning   Type of CM/SW Visit: Initial Assessment    Primary Care Provider verified and updated as needed:     Readmission within the last 30 days:        Reason for Consult: end of life/hospice  Advance Care Planning:            Communication Assessment  Patient's communication style: spoken language (English or Bilingual)    Hearing Difficulty or Deaf: no        Cognitive  Cognitive/Neuro/Behavioral: .WDL except, arousability, level of consciousness  Level of Consciousness: unresponsive  Arousal Level: arouses to pain     Mood/Behavior: other (see comments) (Resting)          Living Environment:   People in home: facility resident (roomate)     Current living Arrangements: residential facility  [facility]   Left  message for [facility] DIA of the home [phone]  Able to return to prior arrangements:  (need to call  staff to work on plan)  Living Arrangement Comments: has 24 hour suppot but not a Rn on sight all day    Family/Social Support:  Care provided by:    Provides care for: no one, unable/limited ability to care for self  Marital Status: Single  Sibling(s)          Description of Support System: Supportive, Involved    Support Assessment: Adequate family and caregiver support, Adequate social supports    Current Resources:   Patient receiving home care services: No     Community Resources: Group Home, Other (see comment)  Equipment currently used at home:    Supplies currently used at home:      Employment/Financial:  Employment Status: disabled        Financial Concerns:             Does the patient's insurance plan have a 3 day qualifying hospital stay waiver?  Yes     Which insurance plan 3 day waiver is available? Alternative insurance waiver    Will the waiver  be used for post-acute placement? No    Lifestyle & Psychosocial Needs:  Social Determinants of Health     Food Insecurity: Not on file   Depression: Not on file   Housing Stability: Not on file   Tobacco Use: Not on file   Financial Resource Strain: Not on file   Alcohol Use: Not on file   Transportation Needs: Not on file   Physical Activity: Not on file   Interpersonal Safety: Not on file   Stress: Not on file   Social Connections: Not on file               Additional Information:  Spoke with family on the phone regarding possible plan for a d.c to his residential home setting with the support of Hospice.   Family are hopefull that pt will pass at the Hospital but understand if he remains stable that we may look at a return home.   Pt's sister and DENICE are co-guardians. They live in [place] and hope to make it to the [facility] Tue/Wed pending the weather    Irina was very clear in her wished for her Uncle that he would NOT want a feeding tube or IV antibiotics.    Irina reported that her Uncle has had a slow decline over the last 6 months> Scott has lives at his current home setting since fall of 2022,  he is on a DD waiver and has a  Mirela Sanabria [phone]    Family would prefer that Scott return to his current living situation. She reported [facility] has had other residents on Hospice and that they used [facility] in this home.  HEATHER educated on family choices.  Irina is comfortable with [facility] guiding the decision on agency.     Called [facility] left VM message to update on plan if stable to return home with Hospice.  Family are sure pt DOES NOT have a hospital bed and may also need home o2???  SW should speak with [facility] about other DME needs.    Send a initial referral to [facility] to check capacity for possible admission for Mon/Tue pending how pt does over night       over night.   Corinne C. White, [facility]  Care mgt team   [phone]

## 2024-03-24 NOTE — CONSULTS
Per discussion with provider, anticipate pt will pass here and no need for CM/SW consult at this time. Consult cleared.     Shila Wong RN BSN   Inpatient Care Coordination  [facility]   Phone [phone]

## 2024-03-24 NOTE — PLAN OF CARE
"PRIMARY DIAGNOSIS: Dementia, comfort cares  OUTPATIENT/OBSERVATION GOALS TO BE MET BEFORE DISCHARGE:  ADLs back to baseline: No    Activity and level of assistance: bed rest, comfort cares    Pain status: Comfort PRN pain meds and Robinol for secretions, repositioning and rest, O2 NC for comfort    Return to near baseline physical activity: No     Discharge Planner Nurse   Safe discharge environment identified: Yes  Barriers to discharge: Yes       Entered by: María Nelson RN 03/24/2024 1:03 PM   Patient repositioned and Prevalon boots applied to nando. Heels. Patient maintains visual comfort with O2 NC at @ 2lpm, rest with repositioning, and quite environment. Patient is sleeping with quick, shallow respirations and snoring present. Plan: SW consult to discuss with family on potential hospice/comfort cares with discharge.     Please review provider order for any additional goals.   Nurse to notify provider when observation goals have been met and patient is ready for discharge.  Problem: Adult Inpatient Plan of Care  Goal: Plan of Care Review  Description: The Plan of Care Review/Shift note should be completed every shift.  The Outcome Evaluation is a brief statement about your assessment that the patient is improving, declining, or no change.  This information will be displayed automatically on your shift  note.  3/24/2024 1302 by María Nelson, RN  Outcome: Not Progressing  3/24/2024 0958 by María Nelson, RN  Outcome: Not Progressing  Goal: Patient-Specific Goal (Individualized)  Description: You can add care plan individualizations to a care plan. Examples of Individualization might be:  \"Parent requests to be called daily at 9am for status\", \"I have a hard time hearing out of my right ear\", or \"Do not touch me to wake me up as it startles  me\".  3/24/2024 1302 by María Nelson, RN  Outcome: Not Progressing  3/24/2024 0958 by María Nelson, RN  Outcome: Not Progressing  Goal: " Absence of Hospital-Acquired Illness or Injury  3/24/2024 1302 by María Nelson, RN  Outcome: Not Progressing  3/24/2024 0958 by María Nelson RN  Outcome: Not Progressing  Intervention: Identify and Manage Fall Risk  Recent Flowsheet Documentation  Taken 3/24/2024 1240 by María Nelson, RN  Safety Promotion/Fall Prevention:   activity supervised   assistive device/personal items within reach   clutter free environment maintained   increased rounding and observation   lighting adjusted   room near nurse's station   safety round/check completed   patient and family education  Taken 3/24/2024 0813 by María Nelson RN  Safety Promotion/Fall Prevention:   activity supervised   assistive device/personal items within reach   clutter free environment maintained   increased rounding and observation   lighting adjusted   room near nurse's station   safety round/check completed   patient and family education  Intervention: Prevent Skin Injury  Recent Flowsheet Documentation  Taken 3/24/2024 1240 by María Nelson, RN  Device Skin Pressure Protection: absorbent pad utilized/changed  Taken 3/24/2024 1200 by María Nelson, RN  Body Position:   turned   heels elevated   log-rolled   left  Taken 3/24/2024 0925 by María Nelson RN  Body Position:   turned   heels elevated   right   log-rolled  Taken 3/24/2024 0813 by María Nelson RN  Device Skin Pressure Protection: absorbent pad utilized/changed  Intervention: Prevent and Manage VTE (Venous Thromboembolism) Risk  Recent Flowsheet Documentation  Taken 3/24/2024 0813 by Maraí Nelson, MARION  VTE Prevention/Management: SCDs (sequential compression devices) off  Goal: Optimal Comfort and Wellbeing  3/24/2024 1302 by María Nelson, RN  Outcome: Not Progressing  3/24/2024 0958 by María Nelson, RN  Outcome: Not Progressing  Intervention: Monitor Pain and Promote Comfort  Recent Flowsheet Documentation  Taken 3/24/2024  0754 by María Nelson, RN  Pain Management Interventions: medication (see MAR)  Goal: Readiness for Transition of Care  3/24/2024 1302 by María Nelson, RN  Outcome: Not Progressing  3/24/2024 0958 by María Nelson, RN  Outcome: Not Progressing   Goal Outcome Evaluation:

## 2024-03-24 NOTE — PROGRESS NOTES
A BiPAP of  12/6 @ 55% was applied to the pt via the mask for an increase in WOB and/or SOB.  The bridge of the nose looks good and remains intact. Pt is tolerating it well. Will continue to monitor and assess the pt's current respiratory status and needs.

## 2024-03-24 NOTE — PROGRESS NOTES
ROOM # [id number]    Living Situation (if not independent, order SW consult): group home  Facility name: [facility]  : vero Arevalo    Activity level at baseline: Wheel chair, lift assist  Activity level on admit: Bedbound    Who will be transporting you at discharge: transport    Patient registered to observation; given Patient Bill of Rights; given the opportunity to ask questions about observation status and their plan of care.  Patient has been oriented to the observation room, bathroom and call light is in place.    Discussed discharge goals and expectations with patient/family.

## 2024-03-24 NOTE — PHARMACY-ADMISSION MEDICATION HISTORY
Pharmacist Admission Medication History    Admission medication history is complete. The information provided in this note is only as accurate as the sources available at the time of the update.    Information Source(s): Facility (U/NH/) medication list/MAR and CareEverywhere/SureScripts via  fax    Pertinent Information: Facility MAR states last doses were on 3/21/24.     Changes made to PTA medication list:  Added: APAP, memantine, pantoprazole, melatonin, clonazepam, APAP, olanzapine, quetiapine  Deleted: donepezil, milk of mag, mag oxide, Omega-3 FA, Augmentin  Changed: sertraline 50mg daily --> 150mg daily    Allergies reviewed with patient and updates made in EHR: unable to assess    Medication History Completed By: NEETA BAUER [facility] 3/24/2024 9:15 AM    Prior to Admission medications    Medication Sig Last Dose Taking? Auth Provider Long Term End Date   acetaminophen (TYLENOL) 32 mg/mL liquid Take 5 mLs by mouth every 4 hours as needed for fever or mild pain Unknown at PRN Yes Unknown, Entered By History     acetaminophen (TYLENOL) 500 MG tablet Take 1,000 mg by mouth 3 times daily 3/21/2024 at 2100 Yes Unknown, Entered By History     Calcium Carb-Cholecalciferol 600-20 MG-MCG TABS Take 1 tablet by mouth every evening 3/21/2024 at 1700 Yes Unknown, Entered By History     clonazePAM (KLONOPIN) 1 MG tablet Take 1 mg by mouth at bedtime 3/21/2024 at 2100 Yes Unknown, Entered By History Yes    melatonin 5 MG tablet Take 10 mg by mouth at bedtime 3/21/2024 at 2100 Yes Unknown, Entered By History     memantine (NAMENDA) 10 MG tablet Take 10 mg by mouth 2 times daily 3/21/2024 at 2100 Yes Unknown, Entered By History     Multiple Vitamins-Minerals (MACULAR HEALTH FORMULA PO) Take 14 mg by mouth every evening 3/21/2024 at 1700 Yes Unknown, Entered By History     multivitamin w/minerals (THERA-VIT-M) tablet Take 1 tablet by mouth every evening 3/21/2024 at 1700 Yes Unknown, Entered By History     OLANZapine  (ZYPREXA) 5 MG tablet Take 5 mg by mouth at bedtime 3/21/2024 at 2100 Yes Unknown, Entered By History Yes    pantoprazole (PROTONIX) 40 MG EC tablet Take 40 mg by mouth daily Unknown at 0800 Yes Unknown, Entered By History     polyethylene glycol (MIRALAX) 17 GM/Dose powder Take 17 g by mouth 2 times daily 3/21/2024 at 2100 Yes Unknown, Entered By History     QUEtiapine (SEROQUEL) 50 MG tablet Take 75 mg by mouth every evening 3/21/2024 at 1700 Yes Unknown, Entered By History     senna-docusate (SENOKOT-S/PERICOLACE) 8.6-50 MG tablet Take 1 tablet by mouth 2 times daily Unknown at unknown time Yes Unknown, Entered By History     sertraline (ZOLOFT) 100 MG tablet Take 150 mg by mouth daily Unknown at 0800 Yes Unknown, Entered By History Yes    vitamin C (ASCORBIC ACID) 1000 MG TABS Take 1,000 mg by mouth daily 3/21/2024 at 0800 Yes Unknown, Entered By History     Vitamin D3 (CHOLECALCIFEROL) 25 mcg (1000 units) tablet Take 2 tablets (50 mcg) by mouth daily Unknown at 1400 Yes Maria Elena Franco, FABIAN

## 2024-03-24 NOTE — ED TRIAGE NOTES
Pt BIBA with c/o decreased responsiveness, and fever at nursing home.  Pt has history of dementia and has had increased difficulty with swallowing this last week.  Upon arrival pt on non rebreather mask at 15LPM with saturations of 90%.  Pt does have a harsh wet sounding cough.  Pt is a DNR/DNI per decision maker.  MD to give orders.

## 2024-03-24 NOTE — H&P
[facility]  Hospitalist Admission Note  Name: Scott Abbasi    MRN: [id number]  YOB: 1935    Age: 88 year old  Date of admission: 3/23/2024  Primary care provider: Romario Membreno    Chief Complaint:  unresponsive, fever, cough    Assessment and Plan:   End of life care: Brought in from his memory care facility after becoming unresponsive.  Reportedly he has had increased difficulty swallowing for the last week in the setting of known oropharyngeal dysphagia, fevers, cough, and shortness of breath.  Obtunded in the ER.  He came in on BiPAP.  Blood pressure was as low as 64/31.  Family present and confirmed DNR/DNI status and request comfort care management only without further workup or treatment for other medical issues.  Received IV lorazepam, fentanyl, and glycopyrrolate in the ER.   met with family in the ER.  Patient is currently obtunded.  -Comfort care order set placed including orders for oral and IV Dilaudid, IV lorazepam, IV Haldol, IV glycopyrrolate, atropine drops sublingually  -Anticipate patient will  within 48 hours    Acute hypoxemic respiratory failure  Suspect aspiration pneumonia  Oropharyngeal dysphagia: Cough, shortness breath, and fevers in the setting of known oropharyngeal dysphagia making aspiration ammonia very likely.  Came in on BiPAP for hypoxia and obtunded status.  As above family does not want further workup or treatment other than comfort management.  N.p.o. due to somnolence.    Dementia  Parkinson's disease  Anxiety: Lives in memory care.  Per family at bedside he can be quite ornery and stubborn when awake.  Currently calm and somnolent.  Looking at medication pharmacy fills AC prescriptions for olanzapine, mirtazapine COVID19, Namenda, donepezil, clonazepam, quetiapine, sertraline, trazodone.  -Currently cannot take any p.o. medications due to decreased level of consciousness.  Lorazepam and Haldol available for agitation with  comfort care order set.    History of DVT  History IVONNE  History of prostate cancer    DVT Prophylaxis: None, end-of-life care  Code Status: DNR / DNI -comfort care only.  Reviewed with family healthcare agents at the bedside.  FEN: N.p.o. due to depressed level of consciousness, no IV fluids  Discharge Dispo: From [facility].  Estimated Disch Date / # of Days until Disch: Admit observation for end-of-life care.  Anticipated patient will  within 48 hours.      History of Present Illness:  Scott Abbasi is a 88 year old male with PMH including Parkinson disease, dementia living in memory care, oropharyngeal dysphagia, DVT not on anticoagulation, anxiety, prostate cancer, IVONNE, and osteoporosis who presents via ambulance for respiratory failure after becoming unresponsive.  Patient is obtunded and cannot provide any history.  He is family members and healthcare agents are at the bedside.  Reportedly he has had 1 week of increased difficulty swallowing, fever, harsh cough, and shortness of breath before becoming unresponsive.  He arrived on BiPAP placed by EMS.  Initial blood pressure was 64/31 in the ER, heart rate 104, temperature 99.8  F.  He appeared to be obtunded.  His family indicates that we should focus on comfort care management only and not provide further workup or life-sustaining treatment.  I spoke with Dr. Whitlock who said the patient seems to be stabilizing somewhat so we will admit observation.  He received IV lorazepam, IV fentanyl, and IV glycopyrrolate for secretions while in the ER.       Clinically Significant Risk Factors Present on Admission                                            Past Medical History reviewed:  Past Medical History:   Diagnosis Date    Anxiety     Cognitive impairment     Deep vein thrombosis     Dementia     Diaphragmatic hernia     Osteoporosis     Parkinson's disease     Phenylketonuria (PKU)     Prostate cancer     Sleep apnea      Past Surgical History  reviewed:  Past Surgical History:   Procedure Laterality Date    APPENDECTOMY      COLONOSCOPY      CYSTOSCOPY, TRANSURETHRAL RESECTION (TUR) PROSTATE, COMBINED      ESOPHAGOSCOPY, GASTROSCOPY, DUODENOSCOPY (EGD), COMBINED N/A 12/18/2022    Procedure: ESOPHAGOGASTRODUODENOSCOPY (EGD);  Surgeon: Dani Morel DO;  Location: [facility]    HERNIA REPAIR      IRRIGATION AND DEBRIDEMENT FINGER, COMBINED Left 12/07/2022    Procedure: Left thumb distal interphalangeal joint irrigation and excisional debridement;  Surgeon: Carlos Horowitz MD;  Location: [facility] OR    OPEN REDUCTION INTERNAL FIXATION HIP NAILING Right 12/07/2022    Procedure: Surgical treatment of right intertrochanteric femur fracture with cephalomedullary device ;  Surgeon: Carlos Horowitz MD;  Location: [facility] OR    RETINAL DETACHMENT REPAIR W/ SCLERAL BUCKLE LE       Social History reviewed:  Social History     Tobacco Use    Smoking status: Not on file    Smokeless tobacco: Not on file   Substance Use Topics    Alcohol use: Not on file     Social History     Social History Narrative    Not on file     Family History chart reviewed:  Noncontributory this admission    Allergies:  No Known Allergies  Medications, formal med rec not complete:  Prior to Admission medications    Medication Sig Last Dose Taking? Auth Provider Long Term End Date   amoxicillin-clavulanate (AUGMENTIN) 875-125 MG tablet Take 1 tablet by mouth every 12 hours   Mehul Balderrama MD     Calcium Carb-Cholecalciferol 600-20 MG-MCG TABS Take 1 tablet by mouth daily   Unknown, Entered By History     donepezil (ARICEPT) 5 MG tablet Take 5 mg by mouth At Bedtime   Unknown, Entered By History     magnesium hydroxide (MILK OF MAGNESIA) 400 MG/5ML suspension Take 30 mLs by mouth daily as needed for constipation or heartburn Give on day 3 of no bm   Unknown, Entered By History     magnesium oxide (MAG-OX) 400 MG tablet Take 400 mg by mouth daily   Unknown, Entered By History     Multiple  Vitamins-Minerals (MACULAR HEALTH FORMULA PO) Take 14 mg by mouth daily   Unknown, Entered By History     multivitamin w/minerals (THERA-VIT-M) tablet Take 1 tablet by mouth daily   Unknown, Entered By History     Omega-3 Fatty Acids (OMEGA 3 PO) Take 3 capsules by mouth daily   Unknown, Entered By History     sertraline (ZOLOFT) 50 MG tablet Take 50 mg by mouth daily   Unknown, Entered By History Yes    vitamin C (ASCORBIC ACID) 1000 MG TABS Take 1,000 mg by mouth daily   Unknown, Entered By History     Vitamin D3 (CHOLECALCIFEROL) 25 mcg (1000 units) tablet Take 2 tablets (50 mcg) by mouth daily   Maria Elena Franco, PA-C       Review of Systems:  A Comprehensive greater than 10 system review of systems was carried out.  Pertinent positives and negatives are noted above.  Otherwise negative.     Physical Exam:  Blood pressure (!) 75/55, pulse 77, temperature 99.8  F (37.7  C), temperature source Axillary, resp. rate 20, SpO2 90%.  Wt Readings from Last 1 Encounters:   12/13/22 79.2 kg (174 lb 9.6 oz)     Exam:  Constitutional: Obtunded, appears frail  HEENT: Dry mucous membranes  Respiratory: Bilateral rhonchi, no wheeze  Cardiovascular: Regular tachycardia, 1/6 systolic murmur  GI: non-tender, not distended, bowel sounds present  Skin: no rash    Musculoskeletal/extremities:  No lower extremity edema  Neurologic: Obtunded  Psychiatric: calm     Lab and imaging data personally reviewed:  No labs or imaging were obtained due to plan for comfort care management    Vicente Knight MD  Hospitalist  [facility]

## 2024-03-24 NOTE — ED NOTES
Pt turned to right side, supported with pillows, family at bedside, pt resting comfortably at this time.

## 2024-03-24 NOTE — PROGRESS NOTES
[facility]    Medicine Progress Note - Hospitalist Service    Date of Admission:  3/23/2024    Assessment & Plan   Scott Abbasi is a 88 year old male with PMH including Parkinson disease, dementia living in memory care, oropharyngeal dysphagia, DVT not on anticoagulation, anxiety, prostate cancer, IVONNE, and osteoporosis who presents via ambulance for respiratory failure after becoming unresponsive.  Admitted to the hospital for end of life care.     End of life care: Brought in from his memory care facility after becoming unresponsive.  Reportedly he has had increased difficulty swallowing for the last week in the setting of known oropharyngeal dysphagia, fevers, cough, and shortness of breath.  Obtunded in the ER.  He came in on BiPAP.  Blood pressure was as low as 64/31.  Family present and confirmed DNR/DNI status and request comfort care management only without further workup or treatment for other medical issues.  Received IV lorazepam, fentanyl, and glycopyrrolate in the ER.   met with family in the ER.  Patient is currently obtunded.  -Comfort care order set placed including orders for oral and IV Dilaudid, IV lorazepam, IV Haldol, IV glycopyrrolate, atropine drops sublingually  -Niece present at bedside who wishes to take patient back home with hospice. CM consult placed for hospice arrangement if patient continues to live for days.      Acute hypoxemic respiratory failure  Suspect aspiration pneumonia  Oropharyngeal dysphagia: Cough, shortness breath, and fevers in the setting of known oropharyngeal dysphagia making aspiration ammonia very likely.  Came in on BiPAP for hypoxia and obtunded status.  As above family does not want further workup or treatment other than comfort management.  N.p.o. due to somnolence.    -NC 2LP for comfort. Would not escalate further.      Dementia  Parkinson's disease  Anxiety: Lives in memory care.  Per family at bedside he can be quite ornery  and stubborn when awake.  Currently calm and somnolent.  Looking at medication pharmacy fills AC prescriptions for olanzapine, mirtazapine COVID19, Namenda, donepezil, clonazepam, quetiapine, sertraline, trazodone.  -Currently cannot take any p.o. medications due to decreased level of consciousness.  Lorazepam and Haldol available for agitation with comfort care order set.     History of DVT  History IVONNE  History of prostate cancer  -no management needed at this time given comfort cares.        Observation Goals: -diagnostic tests and consults completed and resulted, -vital signs normal or at patient baseline, -comfort care, anticipate patient will  within 48 hours., Nurse to notify provider when observation goals have been met and patient is ready for discharge.  Diet: NPO for Medical/Clinical Reasons Except for: Ice Chips, Meds    DVT Prophylaxis: comfort care patient   Tay Catheter: Not present  Lines: None     Cardiac Monitoring: None  Code Status: No CPR- Do NOT Intubate      Clinically Significant Risk Factors Present on Admission                    # Acute Respiratory Failure: Documented O2 saturation < 91%.  Continue supplemental oxygen as needed                Disposition Plan      Expected Discharge Date: 2024                    Daria Gaines MD  Hospitalist Service  [facility]  Securely message with [employer] (more info)  Text page via [facility] Paging/Directory   ______________________________________________________________________    Interval History   On comfort cares. Patient was sleeping, did not awake on voice. Niece and her  present at bedside.       Physical Exam   Vital Signs: Temp: 99.8  F (37.7  C) Temp src: Axillary BP: 98/59 Pulse: 109   Resp: 28 SpO2: 92 % O2 Device: Nasal cannula Oxygen Delivery: 2 LPM  Weight: 0 lbs 0 oz    General:appears comfortable  Resp: shallow breaths, on 2 L NC      Medical Decision Making       25 MINUTES SPENT BY ME on the date  of service doing chart review, history, exam, documentation & further activities per the note.      Data   ------------------------- PAST 24 HR DATA REVIEWED -----------------------------------------------

## 2024-03-24 NOTE — PLAN OF CARE
"PRIMARY DIAGNOSIS: Dementia, Comfort Cares  OUTPATIENT/OBSERVATION GOALS TO BE MET BEFORE DISCHARGE:  ADLs back to baseline: No    Activity and level of assistance: total assist, bed rest, comfort cares    Pain status: Improved with IV Dilaudid, repositioning    Return to near baseline physical activity: No     Discharge Planner Nurse   Safe discharge environment identified: No  Barriers to discharge: Yes       Entered by: María Nelson RN 03/24/2024 9:58 AM    Patient is unresponsive and restless, family at bedside. Patient is comfort cares - repositioning q 2 hours and PRN, on O2 NC - 2LPM for comfort. Given PRN IV Morphine and Robinul for pain and secretions, with effectiveness. Sacral dressing applied for protection to blanchable redness, blanchable redness to bilateral heels, floated heels with pillows. Patient is sleeping but able to be aroused with sternal rub.      Please review provider order for any additional goals.   Nurse to notify provider when observation goals have been met and patient is ready for discharge.  Problem: Adult Inpatient Plan of Care  Goal: Plan of Care Review  Description: The Plan of Care Review/Shift note should be completed every shift.  The Outcome Evaluation is a brief statement about your assessment that the patient is improving, declining, or no change.  This information will be displayed automatically on your shift  note.  Outcome: Not Progressing  Goal: Patient-Specific Goal (Individualized)  Description: You can add care plan individualizations to a care plan. Examples of Individualization might be:  \"Parent requests to be called daily at 9am for status\", \"I have a hard time hearing out of my right ear\", or \"Do not touch me to wake me up as it startles  me\".  Outcome: Not Progressing  Goal: Absence of Hospital-Acquired Illness or Injury  Outcome: Not Progressing  Intervention: Identify and Manage Fall Risk  Recent Flowsheet Documentation  Taken 3/24/2024 0813 by " María Nelson, RN  Safety Promotion/Fall Prevention:   activity supervised   assistive device/personal items within reach   clutter free environment maintained   increased rounding and observation   lighting adjusted   room near nurse's station   safety round/check completed   patient and family education  Intervention: Prevent Skin Injury  Recent Flowsheet Documentation  Taken 3/24/2024 0925 by María Nelson, RN  Body Position:   turned   heels elevated   right   log-rolled  Taken 3/24/2024 0813 by María Nelson, RN  Device Skin Pressure Protection: absorbent pad utilized/changed  Intervention: Prevent and Manage VTE (Venous Thromboembolism) Risk  Recent Flowsheet Documentation  Taken 3/24/2024 0813 by María Nelson, RN  VTE Prevention/Management: SCDs (sequential compression devices) off  Goal: Optimal Comfort and Wellbeing  Outcome: Not Progressing  Intervention: Monitor Pain and Promote Comfort  Recent Flowsheet Documentation  Taken 3/24/2024 0754 by María Nelson, RN  Pain Management Interventions: medication (see MAR)  Goal: Readiness for Transition of Care  Outcome: Not Progressing   Goal Outcome Evaluation:

## 2024-03-24 NOTE — ED NOTES
Bed: ED02  Expected date:   Expected time:   Means of arrival:   Comments:  A595. 88M. AMS, ?fever

## 2024-03-24 NOTE — PLAN OF CARE
"PRIMARY DIAGNOSIS: Dementia, comfort cares  OUTPATIENT/OBSERVATION GOALS TO BE MET BEFORE DISCHARGE:  ADLs back to baseline: No    Activity and level of assistance: Bed rest    Pain status: Improved but still requiring IV narcotics. - Receiving PRN Dilaudid IV    Return to near baseline physical activity: No     Discharge Planner Nurse   Safe discharge environment identified: Yes  Barriers to discharge: Yes       Entered by: María Nelson RN 03/24/2024 4:45 PM   Patient frequently repositioned and Prevalon boots to nando. Heels. Patient maintains visual comfort with O2 NC at @ 2lpm, rest with repositioning, and quite environment. Patient is sleeping with quick, shallow respirations and snoring present. PRN Morphine and Robinul given for restlessness/agitated moaning & increased secretions with effectiveness, upon reassessment patient resting with no visual signs of distress. Plan: Monitor and maintain comfort overnight with potential discharge back to prior living situation with [facility] per provider and SW notes.      Please review provider order for any additional goals.   Nurse to notify provider when observation goals have been met and patient is ready for discharge.  Problem: Adult Inpatient Plan of Care  Goal: Plan of Care Review  Description: The Plan of Care Review/Shift note should be completed every shift.  The Outcome Evaluation is a brief statement about your assessment that the patient is improving, declining, or no change.  This information will be displayed automatically on your shift  note.  3/24/2024 1644 by María Nelson, RN  Outcome: Not Progressing  3/24/2024 1302 by María Nelson, RN  Outcome: Not Progressing  3/24/2024 0958 by María Nelson, RN  Outcome: Not Progressing  Goal: Patient-Specific Goal (Individualized)  Description: You can add care plan individualizations to a care plan. Examples of Individualization might be:  \"Parent requests to be called daily at " "9am for status\", \"I have a hard time hearing out of my right ear\", or \"Do not touch me to wake me up as it startles  me\".  3/24/2024 1644 by María Nelson RN  Outcome: Not Progressing  3/24/2024 1302 by María Nelson, RN  Outcome: Not Progressing  3/24/2024 0958 by María Nelson, RN  Outcome: Not Progressing  Goal: Absence of Hospital-Acquired Illness or Injury  3/24/2024 1644 by María Nelson RN  Outcome: Not Progressing  3/24/2024 1302 by María Nelson RN  Outcome: Not Progressing  3/24/2024 0958 by María Nelson RN  Outcome: Not Progressing  Intervention: Identify and Manage Fall Risk  Recent Flowsheet Documentation  Taken 3/24/2024 1601 by María Nelson RN  Safety Promotion/Fall Prevention:   activity supervised   assistive device/personal items within reach   clutter free environment maintained   increased rounding and observation   lighting adjusted   room near nurse's station   safety round/check completed   patient and family education  Taken 3/24/2024 1240 by María Nelson RN  Safety Promotion/Fall Prevention:   activity supervised   assistive device/personal items within reach   clutter free environment maintained   increased rounding and observation   lighting adjusted   room near nurse's station   safety round/check completed   patient and family education  Taken 3/24/2024 0813 by María Nelson RN  Safety Promotion/Fall Prevention:   activity supervised   assistive device/personal items within reach   clutter free environment maintained   increased rounding and observation   lighting adjusted   room near nurse's station   safety round/check completed   patient and family education  Intervention: Prevent Skin Injury  Recent Flowsheet Documentation  Taken 3/24/2024 1601 by María Nelson, RN  Body Position:   turned   heels elevated   log-rolled   left  Device Skin Pressure Protection: absorbent pad utilized/changed  Taken 3/24/2024 1400 by " María Nelson, RN  Body Position:   turned   heels elevated   log-rolled   right  Taken 3/24/2024 1240 by María Nelson RN  Device Skin Pressure Protection: absorbent pad utilized/changed  Taken 3/24/2024 1200 by María Nelson RN  Body Position:   turned   heels elevated   log-rolled   left  Taken 3/24/2024 0925 by María Nelson RN  Body Position:   turned   heels elevated   right   log-rolled  Taken 3/24/2024 0813 by María Nelson RN  Device Skin Pressure Protection: absorbent pad utilized/changed  Intervention: Prevent and Manage VTE (Venous Thromboembolism) Risk  Recent Flowsheet Documentation  Taken 3/24/2024 0813 by María Nelson RN  VTE Prevention/Management: SCDs (sequential compression devices) off  Goal: Optimal Comfort and Wellbeing  3/24/2024 1644 by María Nelson, RN  Outcome: Not Progressing  3/24/2024 1302 by María Nelson, RN  Outcome: Not Progressing  3/24/2024 0958 by María Nelson, RN  Outcome: Not Progressing  Intervention: Monitor Pain and Promote Comfort  Recent Flowsheet Documentation  Taken 3/24/2024 0754 by María Nelson, RN  Pain Management Interventions: medication (see MAR)  Goal: Readiness for Transition of Care  3/24/2024 1644 by María Nelson RN  Outcome: Not Progressing  3/24/2024 1302 by María Nelson, RN  Outcome: Not Progressing  3/24/2024 0958 by María Nelson RN  Outcome: Not Progressing   Goal Outcome Evaluation:

## 2024-03-24 NOTE — ED PROVIDER NOTES
"  History     Chief Complaint:  Altered Mental Status     The history is provided by the EMS personnel and a relative.      Scott Abbasi is a 88 year old male with history of parkinson's disease and oropharyngeal dysphagia who presents to the ED with coughing and shortness of breath. Patient arrives from memory care with decreasing responsiveness. Staff stated patient had felt hot and has been experiencing increasing cough and shortness of breath. Last well reported by EMS was 8 hours prior to arrival. Patient arrive to ED with 15 L CPAP.  Initial blood pressure is 64/31 in ED.     Niece (Guardian):  Patient has been agitated recently and experiencing difficultly swallowing all week. \"Patient has not been great these last few years with the dementia\". Confirms patient is DNR and DNI and comfort care only.  Declines central line for patient or further medical workup requesting that we simply keep him comfortable.     Independent Historian:   EMS - They report as noted above.  Patient's niece and guardian is here as noted above    Review of External Notes:   Reviewing notes from nursing care facility.      Medications:    Calcium Carb-Cholecalciferol 600-20 MG-MCG TABS  donepezil (ARICEPT) 5 MG tablet  magnesium hydroxide (MILK OF MAGNESIA) 400 MG/5ML suspension  magnesium oxide (MAG-OX) 400 MG tablet  Multiple Vitamins-Minerals (MACULAR HEALTH FORMULA PO)  multivitamin w/minerals (THERA-VIT-M) tablet  Omega-3 Fatty Acids (OMEGA 3 PO)  sertraline (ZOLOFT) 50 MG tablet  vitamin C (ASCORBIC ACID) 1000 MG TABS  Vitamin D3 (CHOLECALCIFEROL) 25 mcg (1000 units) tablet      Past Medical History:    Past Medical History:   Diagnosis Date    Anxiety     Cognitive impairment     Deep vein thrombosis     Dementia     Diaphragmatic hernia     Osteoporosis     Parkinson's disease     Phenylketonuria (PKU)     Prostate cancer     Sleep apnea      Past Surgical History:    Past Surgical History:   Procedure Laterality Date    " APPENDECTOMY      COLONOSCOPY      CYSTOSCOPY, TRANSURETHRAL RESECTION (TUR) PROSTATE, COMBINED      ESOPHAGOSCOPY, GASTROSCOPY, DUODENOSCOPY (EGD), COMBINED N/A 12/18/2022    Procedure: ESOPHAGOGASTRODUODENOSCOPY (EGD);  Surgeon: Dani Morel DO;  Location: [facility]    HERNIA REPAIR      IRRIGATION AND DEBRIDEMENT FINGER, COMBINED Left 12/07/2022    Procedure: Left thumb distal interphalangeal joint irrigation and excisional debridement;  Surgeon: Carlos Horowitz MD;  Location: [facility] OR    OPEN REDUCTION INTERNAL FIXATION HIP NAILING Right 12/07/2022    Procedure: Surgical treatment of right intertrochanteric femur fracture with cephalomedullary device ;  Surgeon: Carlos Horowitz MD;  Location: [facility] OR    RETINAL DETACHMENT REPAIR W/ SCLERAL BUCKLE LE          Physical Exam   Patient Vitals for the past 24 hrs:   BP Temp Temp src Pulse Resp SpO2   03/24/24 0500 -- -- -- -- --  84 %   03/24/24 0430 -- -- -- -- --  85 %   03/24/24 0400 -- -- -- -- --  83 %   03/24/24 0350 -- -- -- -- --  83 %   03/24/24 0336 -- -- -- -- --  79 %   03/24/24 0330 -- -- -- -- --  81 %   03/24/24 0310 -- -- -- -- --  84 %   03/23/24 2345 75/55 99.8  F (37.7  C) Axillary 77 20 90 %   03/23/24 2340  64/31 -- -- 75 --  88 %   03/23/24 2330 106/77 -- -- 77 -- 90 %   03/23/24 2320 94/53 -- -- 104 -- 90 %   03/23/24 2317  75/55 -- -- 103 -- --      Physical Exam  Nursing note and vitals reviewed.  Constitutional: Ill-appearing, BiPAP in place  HENT:   Mouth/Throat: Mucous membranes are very dry  Cardiovascular: Tachycardic rate, regular rhythm.  Pulmonary/Chest: Marked increased work of breathing.  Tachypneic.  Coarse breath sounds bilaterally  Abdominal: Normal appearance  Musculoskeletal: No deformity  Neurological: Alert. GCS 9 (M5, V2, E2)  Skin: Skin is warm and dry.      Emergency Department Course      Laboratory:  None     Procedures   None    Interventions:  Medications   fentaNYL (PF) (SUBLIMAZE) injection 100 mcg (100 mcg  Intravenous $Given 3/24/24 0242)   LORazepam (ATIVAN) injection 1 mg (1 mg Intravenous $Given 3/24/24 0210)   glycopyrrolate (ROBINUL) injection 0.2 mg (has no administration in time range)   fentaNYL (PF) (SUBLIMAZE) injection 100 mcg (100 mcg Intravenous $Given 3/23/24 2357)   LORazepam (ATIVAN) injection 2 mg (2 mg Intravenous $Given 3/24/24 0001)      Assessments:  2342 I obtained the history and examined the patient as noted above. I spoke with nursing home manager and niece (legal guardian).   0006 Bipap was removed per family's request.  0032 Patient is resting more comfortably after fentanyl and Ativan. Oxygen saturation in 80s.  0109 I rechecked the patient and updated family.  0144 I rechecked the patient and updated family.    Independent Interpretation (X-rays, CTs, rhythm strip):  None    Consultations/Discussion of Management or Tests:  0325 I consulted with Dr. Knight (hospitalist) regarding the patient.    Social Determinants of Health affecting care:   Patient resides in a memory care center and is dependent for total care    Disposition:  The patient was admitted to the hospital under the care of Dr. Knight.     Impression & Plan      Medical Decision Making:  This is an 88-year-old gentleman with a history above.  He presents critically ill with hypoxic respiratory failure.  Family and medical decision maker is at the bedside as above.  We had a thorough discussion given the patient's presentation which included hypoxia with significant oxygen support requirement as well as hypotension in the setting of a fever.  This is likely pneumonia with septic shock.  DNR/DNI status was confirmed.  Family also requested that we withdraw additional medical care in favor of comfort care measures.  Fentanyl and Ativan provided as well as glycopyrrolate for secretion control.  Patient was observed in the emergency department with saturations steady in the 80s and slight hypotension.  He will be admitted for  comfort care measures.    Diagnosis:    ICD-10-CM    1. Fever in adult  R50.9       2. Acute respiratory failure with hypoxia (H)  J96.01       3. History of dementia  Z86.59            Scribe Disclosure:  I, Matthew Martin, am serving as a scribe at 11:24 PM on 3/23/2024 to document services personally performed by Kar Whitlock MD based on my observations and the provider's statements to me.     3/23/2024   Kar Whitlock MD Amdahl, John, MD  03/24/24 9072

## 2024-03-24 NOTE — PROGRESS NOTES
SPIRITUAL HEALTH SERVICES - Progress Note   [facility] ED     Referral Source: On-Call Page      I visited with Scott and his family per on-call page. Scott's family shared that he is much more comfortable now than he was before. They expressed gratitude that the  was able to anoint Scott.         Carmelina Mckeon (they/them)[facility]  Spiritual Health Services  Chaplain Resident    [facility] routine referrals?*[phone]  [facility] available 24/7 for emergent requests/referrals, either by paging the on-call  or by entering an ASAP/STAT consult in Epic (this will also page the on-call ).

## 2024-03-24 NOTE — UTILIZATION REVIEW
Admission Status; Secondary Review Determination         Under the authority of the Utilization Management Committee, the utilization review process indicated a secondary review on the above patient.  The review outcome is based on review of the medical records, discussions with staff, and applying clinical experience noted on the date of the review.        (x)   Observation Status Appropriate - This patient's medical care is consistent with medical management for inpatient care and reasonable inpatient medical practice.     RATIONALE FOR DETERMINATION   The patient is an 88-year-old male admitted on 3/23/2024.  The patient was brought to the ED via EMS for respiratory failure after becoming unresponsive.  Patient was transferred from a [facility] in an unresponsive state.  He had not been on O2 previously.  He does have DNR/DNI.  Patient had acute hypoxic respiratory failure thought to be due to aspiration pneumonia.  O2 sats are from 79% to 82% and currently the patient is on 2 L of O2 at 92% O2 sat.  End-of-life care was discussed with family  with families' wishes to transfer the patient back to hospice care at facility, likely tomorrow.  Based on new hypoxia and transferred to hospice with a 2 midnight stay, recommend continued observation status at this time with follow-up status determination taking into account, current comfort care/hospice status.      The severity of illness, intensity of service provided, expected LOS and risk for adverse outcome make the care complex, high risk and appropriate for hospital admission.        The information on this document is developed by the utilization review team in order for the business office to ensure compliance.  This only denotes the appropriateness of proper admission status and does not reflect the quality of care rendered.         The definitions of Inpatient Status and Observation Status used in making the determination above are those provided  in the CMS Coverage Manual, Chapter 1 and Chapter 6, section 70.4.      Sincerely,     Mehrdad Orantes MD  Physician Advisor  Utilization Review/ Case Management  [facility].

## 2024-03-24 NOTE — ED NOTES
[facility]  ED Nurse Handoff Report    ED Chief complaint: Altered Mental Status  . ED Diagnosis:   Final diagnoses:   Fever in adult   Acute respiratory failure with hypoxia (H)   History of dementia       Allergies: No Known Allergies    Code Status: DNR / DNI    Activity level - Baseline/Home:  assist of 2.  Activity Level - Current:   lift.   Lift room needed: Yes.   Bariatric: No   Needed: No   Isolation: No.   Infection: Not Applicable.     Respiratory status: Room air    Vital Signs (within 30 minutes):   Vitals:    03/23/24 2345 03/24/24 0310 03/24/24 0336 03/24/24 0350   BP: (!) 75/55      Pulse: 77      Resp: 20      Temp: 99.8  F (37.7  C)      TempSrc: Axillary      SpO2: 90% (!) 84% (!) 79% (!) 83%       Cardiac Rhythm:  ,      Pain level:    Patient confused: Yes.   Patient Falls Risk: nonskid shoes/slippers when out of bed and patient and family education.   Elimination Status: Has voided     Patient Report - Initial Complaint: Pt on comfort cares, has received iv fentanyl and ativan.   Focused Assessment: Pt on room air with sats of 84%, heart rate in the low 100's, family at bedside.     Abnormal Results:   Labs Ordered and Resulted from Time of ED Arrival to Time of ED Departure - No data to display     No orders to display       Treatments provided: medications and repositioning  Family Comments: want pt to be comfortable   OBS brochure/video discussed/provided to patient:  Yes  ED Medications:   Medications   naloxone (NARCAN) injection 0.1 mg (has no administration in time range)   naloxone (NARCAN) injection 0.2 mg (has no administration in time range)   bisacodyl (DULCOLAX) suppository 10 mg (has no administration in time range)   sennosides (SENOKOT) tablet 1 tablet (has no administration in time range)   carboxymethylcellulose PF (REFRESH PLUS) 0.5 % ophthalmic solution 1-2 drop (has no administration in time range)   mineral oil-hydrophilic petrolatum  (AQUAPHOR) (has no administration in time range)   HYDROmorphone (STANDARD CONC) (DILAUDID) oral solution 1 mg (has no administration in time range)     Or   HYDROmorphone (DILAUDID) half-tab 1 mg (has no administration in time range)   HYDROmorphone (STANDARD CONC) (DILAUDID) oral solution 2 mg (has no administration in time range)     Or   HYDROmorphone (DILAUDID) tablet 2 mg (has no administration in time range)   HYDROmorphone (PF) (DILAUDID) injection 0.3 mg (has no administration in time range)   HYDROmorphone (PF) (DILAUDID) injection 0.5 mg (has no administration in time range)   acetaminophen (TYLENOL) Suppository 650 mg (has no administration in time range)   acetaminophen (TYLENOL) tablet 650 mg (has no administration in time range)   LORazepam (ATIVAN) injection 1 mg (has no administration in time range)     Or   LORazepam (ATIVAN) tablet 1 mg (has no administration in time range)   haloperidol lactate (HALDOL) injection 2 mg (has no administration in time range)   ondansetron (ZOFRAN ODT) ODT tab 4 mg (has no administration in time range)     Or   ondansetron (ZOFRAN) injection 4 mg (has no administration in time range)   prochlorperazine (COMPAZINE) injection 5 mg (has no administration in time range)     Or   prochlorperazine (COMPAZINE) tablet 5 mg (has no administration in time range)     Or   prochlorperazine (COMPAZINE) suppository 12.5 mg (has no administration in time range)   glycopyrrolate (ROBINUL) injection 0.2 mg (has no administration in time range)   atropine 1 % ophthalmic solution 2 drop (has no administration in time range)   Contraindications to both pharmacological and mechanical prophylaxis (must document contraindications for both in this order) (has no administration in time range)   fentaNYL (PF) (SUBLIMAZE) injection 100 mcg (100 mcg Intravenous $Given 3/23/24 7051)   LORazepam (ATIVAN) injection 2 mg (2 mg Intravenous $Given 3/24/24 0001)       Drips infusing:  No  For the  majority of the shift this patient was Green.   Interventions performed were none.    Sepsis treatment initiated: No    Cares/treatment/interventions/medications to be completed following ED care: pt is comfort cares    ED Nurse Name: Marycruz Solorzano RN  3:59 AM     RECEIVING UNIT ED HANDOFF REVIEW    Above ED Nurse Handoff Report was reviewed: Yes  Reviewed by: Mirlande Carpenter RN on March 24, 2024 at 5:01 AM

## 2024-03-25 NOTE — PLAN OF CARE
"PRIMARY DIAGNOSIS: History of dementia   OUTPATIENT/OBSERVATION GOALS TO BE MET BEFORE DISCHARGE:  ADLs back to baseline: No    Activity and level of assistance: bedrest    Pain status: pain management for comfort, end of life    Return to near baseline physical activity:  end of life     Discharge Planner Nurse   Safe discharge environment identified:  Yes  Barriers to discharge: Yes, needs hospital bed and home oxygen        Entered by: Lindsey High RN 03/25/2024 9:42 AM     Please review provider order for any additional goals.   Nurse to notify provider when observation goals have been met and patient is ready for discharge.    Unresponsive, repo q2, dilaudid and ativan given for comfort, family at the bedside, SW following, referrals sent for hospice, will need hospital bed and oxygen for discharge.     Problem: Adult Inpatient Plan of Care  Goal: Plan of Care Review  Description: The Plan of Care Review/Shift note should be completed every shift.  The Outcome Evaluation is a brief statement about your assessment that the patient is improving, declining, or no change.  This information will be displayed automatically on your shift  note.  Outcome: Progressing  Goal: Patient-Specific Goal (Individualized)  Description: You can add care plan individualizations to a care plan. Examples of Individualization might be:  \"Parent requests to be called daily at 9am for status\", \"I have a hard time hearing out of my right ear\", or \"Do not touch me to wake me up as it startles  me\".  Outcome: Progressing  Goal: Absence of Hospital-Acquired Illness or Injury  Outcome: Progressing  Intervention: Identify and Manage Fall Risk  Recent Flowsheet Documentation  Taken 3/25/2024 0855 by Lindsey High RN  Safety Promotion/Fall Prevention:   activity supervised   safety round/check completed  Intervention: Prevent Skin Injury  Recent Flowsheet Documentation  Taken 3/25/2024 0851 by Lindsey High RN  Body Position:   turned   " right   supine, head elevated  Intervention: Prevent Infection  Recent Flowsheet Documentation  Taken 3/25/2024 0855 by Lindsey High, RN  Infection Prevention:   cohorting utilized   hand hygiene promoted   rest/sleep promoted   single patient room provided  Goal: Optimal Comfort and Wellbeing  Outcome: Progressing  Intervention: Monitor Pain and Promote Comfort  Recent Flowsheet Documentation  Taken 3/25/2024 0851 by Lindsey High, RN  Pain Management Interventions:   medication (see MAR)   repositioned   rest  Goal: Readiness for Transition of Care  Outcome: Progressing     Problem: End-of-Life Care  Goal: Comfort, Peace and Preserved Dignity  Outcome: Progressing

## 2024-03-25 NOTE — SIGNIFICANT EVENT
SPIRITUAL HEALTH SERVICES Significant Event  Religious Sacrament of ANOINTING  [facility]  Obs 2    Pt anointed by Father Eliot per request of family.    Eliot Murrieta M.Div.   Intern  Phone: [phone]

## 2024-03-25 NOTE — CONSULTS
SPIRITUAL HEALTH SERVICES Progress Note  Obs 2    Met with niece of patient at bedside, (while patient was sleeping) regarding on call page for [employer] anointing.    Contacted Fr. Mccabe from [facility] to anoint patient.    No additional needs at this time.  [facility] remains available.    Rev. Renate Laureano M.Div.  Staff   Phone  [phone]

## 2024-03-25 NOTE — PLAN OF CARE
"PRIMARY DIAGNOSIS: Dementia/Comfort Cares  OUTPATIENT/OBSERVATION GOALS TO BE MET BEFORE DISCHARGE:  ADLs back to baseline: No     Activity and level of assistance: Bedrest     Pain status: Improved but still requiring IV narcotics.     Return to near baseline physical activity: No          Discharge Planner Nurse   Safe discharge environment identified: No  Barriers to discharge: Yes       Entered by: Dali Albright RN 03/25/2024        Pt on 2L NC. Pt repositioned, heel boots on. IV dilaudid/Ativan given for pain/moaning/restlessness. Pt had fever of 100.9, Tylenol suppository given. External catheter in place. Breathing is at times  labored and quick with intermittent snoring.  SW following for possible discharge to [facility].      Problem: Adult Inpatient Plan of Care  Goal: Plan of Care Review  Description: The Plan of Care Review/Shift note should be completed every shift.  The Outcome Evaluation is a brief statement about your assessment that the patient is improving, declining, or no change.  This information will be displayed automatically on your shift  note.  3/25/2024 0424 by Dali Albright RN  Outcome: Not Progressing  3/25/2024 0420 by Dali Albright RN  Outcome: Not Progressing  3/25/2024 0405 by Dali Albright RN  Outcome: Not Progressing  Goal: Patient-Specific Goal (Individualized)  Description: You can add care plan individualizations to a care plan. Examples of Individualization might be:  \"Parent requests to be called daily at 9am for status\", \"I have a hard time hearing out of my right ear\", or \"Do not touch me to wake me up as it startles  me\".  3/25/2024 0424 by Dali Albright RN  Outcome: Not Progressing  3/25/2024 0420 by Dali Albright RN  Outcome: Not Progressing  3/25/2024 0405 by Dali Albright RN  Outcome: Not Progressing  Goal: Absence of Hospital-Acquired Illness or Injury  3/25/2024 0424 by Dali Albright RN  Outcome: Not Progressing  3/25/2024 0420 by Dali Albright RN  Outcome: " Not Progressing  3/25/2024 0405 by Dali Albright RN  Outcome: Not Progressing  Intervention: Identify and Manage Fall Risk  Recent Flowsheet Documentation  Taken 3/25/2024 0015 by Dali Albright RN  Safety Promotion/Fall Prevention:   activity supervised   safety round/check completed  Intervention: Prevent Skin Injury  Recent Flowsheet Documentation  Taken 3/25/2024 0015 by Dali Albright RN  Body Position:   turned   left   log-rolled   heels elevated  Device Skin Pressure Protection: absorbent pad utilized/changed  Taken 3/24/2024 2045 by Dali Albright RN  Body Position:   turned   heels elevated  Intervention: Prevent Infection  Recent Flowsheet Documentation  Taken 3/25/2024 0015 by Dali Albright RN  Infection Prevention:   cohorting utilized   hand hygiene promoted   rest/sleep promoted   single patient room provided  Goal: Optimal Comfort and Wellbeing  3/25/2024 0424 by Dali Albright RN  Outcome: Not Progressing  3/25/2024 0420 by Dali Albright RN  Outcome: Not Progressing  3/25/2024 0405 by Dali Albright RN  Outcome: Not Progressing  Intervention: Monitor Pain and Promote Comfort  Recent Flowsheet Documentation  Taken 3/25/2024 0015 by Dali Albright RN  Pain Management Interventions:   repositioned   rest   quiet environment facilitated  Goal: Readiness for Transition of Care  3/25/2024 0424 by Dali Albright RN  Outcome: Not Progressing  3/25/2024 0420 by Dali Albright RN  Outcome: Not Progressing  3/25/2024 0405 by Dali Albright RN  Outcome: Not Progressing

## 2024-03-25 NOTE — PLAN OF CARE
PRIMARY DIAGNOSIS: Dementia/Comfort Cares  OUTPATIENT/OBSERVATION GOALS TO BE MET BEFORE DISCHARGE:  ADLs back to baseline: No     Activity and level of assistance: Bedrest     Pain status: Improved but still requiring IV narcotics.     Return to near baseline physical activity: No          Discharge Planner Nurse   Safe discharge environment identified: No  Barriers to discharge: Yes       Entered by: Dali Albright RN 03/25/2024        Pt on 2L NC. Pt repositioned, heel boots on. IV dilaudid given for pain/moaning. External catheter in place.  Please review provider order for any additional goals.   Nurse to notify provider when observation goals have been met and patient is ready for discharge.

## 2024-03-25 NOTE — PLAN OF CARE
"PRIMARY DIAGNOSIS: History of dementia   OUTPATIENT/OBSERVATION GOALS TO BE MET BEFORE DISCHARGE:  ADLs back to baseline: No    Activity and level of assistance: bedrest    Pain status: pain management for comfort, end of life    Return to near baseline physical activity:  end of life     Discharge Planner Nurse   Safe discharge environment identified:  Yes  Barriers to discharge: Yes, needs hospital bed and home oxygen        Entered by: Lindsey High RN 03/25/2024 3:58 PM     Please review provider order for any additional goals.   Nurse to notify provider when observation goals have been met and patient is ready for discharge.    Unresponsive, per family request not to reposition q2 due to repositioning causing distress to patient, dilaudid and ativan given for comfort, SW following, referrals sent for hospice, will need hospital bed and oxygen for discharge, plan for discharge tomorrow     Problem: Adult Inpatient Plan of Care  Goal: Plan of Care Review  Description: The Plan of Care Review/Shift note should be completed every shift.  The Outcome Evaluation is a brief statement about your assessment that the patient is improving, declining, or no change.  This information will be displayed automatically on your shift  note.  Outcome: Progressing  Goal: Patient-Specific Goal (Individualized)  Description: You can add care plan individualizations to a care plan. Examples of Individualization might be:  \"Parent requests to be called daily at 9am for status\", \"I have a hard time hearing out of my right ear\", or \"Do not touch me to wake me up as it startles  me\".  Outcome: Progressing  Goal: Absence of Hospital-Acquired Illness or Injury  Outcome: Progressing  Intervention: Identify and Manage Fall Risk  Recent Flowsheet Documentation  Taken 3/25/2024 0855 by Lindsey High RN  Safety Promotion/Fall Prevention:   activity supervised   safety round/check completed  Intervention: Prevent Skin Injury  Recent Flowsheet " Documentation  Taken 3/25/2024 0851 by Lindsey High, RN  Body Position:   turned   right   supine, head elevated  Intervention: Prevent Infection  Recent Flowsheet Documentation  Taken 3/25/2024 0855 by Lindsey High, RN  Infection Prevention:   cohorting utilized   hand hygiene promoted   rest/sleep promoted   single patient room provided  Goal: Optimal Comfort and Wellbeing  Outcome: Progressing  Intervention: Monitor Pain and Promote Comfort  Recent Flowsheet Documentation  Taken 3/25/2024 0851 by Lindsey High, RN  Pain Management Interventions:   medication (see MAR)   repositioned   rest  Goal: Readiness for Transition of Care  Outcome: Progressing     Problem: End-of-Life Care  Goal: Comfort, Peace and Preserved Dignity  Outcome: Progressing

## 2024-03-25 NOTE — CONSULTS
Care Management Follow Up    Length of Stay (days): 0    Expected Discharge Date: 03/26/2024     Concerns to be Addressed: discharge planning     Patient plan of care discussed at interdisciplinary rounds: Yes    Anticipated Discharge Disposition[facility]      Anticipated Discharge Services: [facility]  Anticipated Discharge DME: Oxygen - 2L    Patient/family educated on Medicare website which has current facility and service quality ratings: no  Education Provided on the Discharge Plan:    Patient/Family in Agreement with the Plan: yes    Referrals Placed by CM/SW:    Private pay costs discussed: transportation costs    Additional Information:  SW following for discharge plan with hospice.     Initially family was wanting [facility]. Met with pt's niece Irina (co-guardian). She is in contact with her parents who are the other 2 co-guardians and updating them on discharge plan. Irina states that pt's  is requesting to use [facility] and Irina is in agreement with that. Pt already has a hospital bed at the .     Referral sent to [facility]. Spoke with Maggie in intake. They are able to accept and do a hospice admission tomorrow at 1300. They will order O2 and have that delivered to the . [facility] requests 3 days of comfort meds be sent with pt.     [employer] stretcher ride arranged for tomorrow between 10:30-11:30am. With 2L O2.     Updated Irina on discharge time.     Left  for  manager Leola p:[phone].     Social work will continue to follow and assist with discharge planning as needed.    VALERIA Darnell, W  Care Coordination  [phone]    VALERIA Noriega

## 2024-03-25 NOTE — PROGRESS NOTES
[facility]    Medicine Progress Note - Hospitalist Service    Date of Admission:  3/23/2024    Assessment & Plan   Scott Abbasi is a 88 year old male with PMH including Parkinson disease, dementia living in memory care, oropharyngeal dysphagia, DVT not on anticoagulation, anxiety, prostate cancer, IVONNE, and osteoporosis who presents via ambulance for respiratory failure after becoming unresponsive.  Admitted to the hospital for end of life care.     End of life care: Brought in from his memory care facility after becoming unresponsive.  Reportedly he has had increased difficulty swallowing for the last week in the setting of known oropharyngeal dysphagia, fevers, cough, and shortness of breath.  Obtunded in the ER.  He came in on BiPAP.  Blood pressure was as low as 64/31.  Family present and confirmed DNR/DNI status and request comfort care management only without further workup or treatment for other medical issues.  Received IV lorazepam, fentanyl, and glycopyrrolate in the ER.   met with family in the ER.  Patient is currently obtunded.  -Comfort care order set placed including orders for oral and IV Dilaudid, IV lorazepam, IV Haldol, IV glycopyrrolate, atropine drops sublingually  - is working to arrange hospice outpatient if patient continues to live for days  -patient has 3 HCA (Irina, magdalene and Jack) and per chart  they must make decision jointly. Irina (niece) is okay with patient going back to his facility along with hospice. Irina updated me that her mom Magdalene wants patient to stay in the hospital at least till tues/Wednesday before they make decision about discharge. I called Magdalene/Jack but couldn't get a hold any one of them.  Patient appears stable (not actively dying) per my assessment. Would favor discharge once hospice gets set up ( SW is working), likely tomorrow.      Acute hypoxemic respiratory failure  Suspect aspiration pneumonia  Oropharyngeal  dysphagia: Cough, shortness breath, and fevers in the setting of known oropharyngeal dysphagia making aspiration ammonia very likely.  Came in on BiPAP for hypoxia and obtunded status.  As above family does not want further workup or treatment other than comfort management.  N.p.o. due to somnolence.    -NC 2LP for comfort. Would not escalate further.      Dementia  Parkinson's disease  Anxiety: Lives in memory care.  Per family at bedside he can be quite ornery and stubborn when awake.  Currently calm and somnolent.  Looking at medication pharmacy fills AC prescriptions for olanzapine, mirtazapine COVID19, Namenda, donepezil, clonazepam, quetiapine, sertraline, trazodone.  -Currently cannot take any p.o. medications due to decreased level of consciousness.  Lorazepam and Haldol available for agitation with comfort care order set.     History of DVT  History IVONNE  History of prostate cancer  -no management needed at this time given comfort cares.        Observation Goals: -diagnostic tests and consults completed and resulted, -vital signs normal or at patient baseline, -comfort care, anticipate patient will  within 48 hours., Nurse to notify provider when observation goals have been met and patient is ready for discharge.  Diet: NPO for Medical/Clinical Reasons Except for: Ice Chips, Meds    DVT Prophylaxis: comfort care patient   Tay Catheter: Not present  Lines: None     Cardiac Monitoring: None  Code Status: No CPR- Do NOT Intubate      Clinically Significant Risk Factors Present on Admission                                  Disposition Plan  likely tomorrow once      Expected Discharge Date: 2024      Destination: home with help/services              Daria Gaines MD  Hospitalist Service  [facility]  Securely message with [employer] (more info)  Text page via [facility] Paging/Directory   ______________________________________________________________________    Interval History   On  comfort cares. Patient was sleeping, grimaces to voice. Appears comfortable.     Physical Exam   Vital Signs: Temp: 98.9  F (37.2  C) Temp src: Axillary       Resp: 24        Weight: 0 lbs 0 oz    General:appears comfortable  Resp: shallow breaths, on 2 L NC      Medical Decision Making       26 MINUTES SPENT BY ME on the date of service doing chart review, history, exam, documentation & further activities per the note.      Data   ------------------------- PAST 24 HR DATA REVIEWED -----------------------------------------------

## 2024-03-25 NOTE — PROGRESS NOTES
[facility]  Progress Note  Obs 2    Met with patient and family due to on call request for [employer] anointing.    Provided sacrament of anointing.    No additional needs.

## 2024-03-26 NOTE — DISCHARGE SUMMARY
[facility]    Discharge Summary  Hospitalist    Date of Admission:  3/23/2024  Date of Discharge:  3/26/2024  Discharging Provider: Annemarie Alves MD  Date of Service (when I saw the patient): 03/26/24    Discharge Diagnoses   End of life care  Acute hypoxemic respiratory failure  Aspiration pneumonia  Oropharyngeal dysphagia  Dementia  Parkinson's disease  Anxiety  History of DVT  IVONNE  Prostate cancer    History of Present Illness   Scott Abbasi is a 88 year old man who was admitted on 3/23/2024. PMH significant for Parkinson disease, dementia living in memory care, history of developmental delay, oropharyngeal dysphagia, DVT not on anticoagulation, anxiety, prostate cancer, IVONNE, and osteoporosis who presents via ambulance for respiratory failure after becoming unresponsive.  Patient is obtunded and cannot provide any history.  He is family members and healthcare agents are at the bedside.  Reportedly he has had 1 week of increased difficulty swallowing, fever, harsh cough, and shortness of breath before becoming unresponsive.  He arrived on BiPAP placed by EMS.  Initial blood pressure was 64/31 in the ER, heart rate 104, temperature 99.8  F.  He appeared to be obtunded.  His family indicates that we should focus on comfort care management only and not provide further workup or life-sustaining treatment.  I spoke with Dr. Whitlock who said the patient seems to be stabilizing somewhat so we will admit observation.  He received IV lorazepam, IV fentanyl, and IV glycopyrrolate for secretions while in the ER.    Hospital Course   Scott Abbasi was admitted on 3/23/2024.  The following problems were addressed during his hospitalization:    Cause of death:   Aspiration pneumonia, duration 3 days  Dysphagia, duration 3 months  Dementia, duration 7 years    End of life care: Brought in from his memory care facility after becoming unresponsive.  Reportedly he has had increased difficulty  swallowing for the last week in the setting of known oropharyngeal dysphagia, fevers, cough, and shortness of breath.  Obtunded in the ER.  He came in on BiPAP.  Blood pressure was as low as 64/31.  Family present and confirmed DNR/DNI status and request comfort care management only without further workup or treatment for other medical issues.  Received IV lorazepam, fentanyl, and glycopyrrolate in the ER.   met with family in the ER.  Patient is currently obtunded.  -Comfort care order set placed including orders for oral and IV Dilaudid, IV lorazepam, IV Haldol, IV glycopyrrolate, atropine drops sublingually  - is working to arrange hospice outpatient if patient continues to live for days  -patient has 3 HCA (Irina, cholo and Jack) and per chart  they must make decision jointly. Irina (niece) is okay with patient going back to his facility along with hospice. Irina updated me that her mom Cholo wants patient to stay in the hospital at least till tues/Wednesday before they make decision about discharge. I called Cholo/Jack but couldn't get a hold any one of them.   - As of 3/26/24 morning, patient actively dying. Continued to manage symptoms in the hospital and patient  at 10:46 AM.     Acute hypoxemic respiratory failure  Suspect aspiration pneumonia  Oropharyngeal dysphagia: Cough, shortness breath, and fevers in the setting of known oropharyngeal dysphagia making aspiration ammonia very likely.  Came in on BiPAP for hypoxia and obtunded status.  As above family does not want further workup or treatment other than comfort management.  N.p.o. due to somnolence.  -NC 2LP for comfort. Would not escalate further.      Dementia  Parkinson's disease  Anxiety: Lives in memory care.  Per family at bedside he can be quite ornery and stubborn when awake.  Currently calm and somnolent.  Looking at medication pharmacy fills AC prescriptions for olanzapine, mirtazapine, Namenda, donepezil, clonazepam,  quetiapine, sertraline, trazodone.  -Currently cannot take any p.o. medications due to decreased level of consciousness.  Lorazepam and Haldol available for agitation with comfort care order set.     History of DVT  History IVONNE  History of prostate cancer  -no management needed at this time given comfort cares.      Annemarie Alves MD FACP  Hospitalist Service  [facility]    Pending Results   N/A    Code Status   Comfort Care       Primary Care Physician   Romario Bose    Physical Exam   Temp: (!) 102.4  F (39.1  C) Temp src: Axillary       Resp: (!) 40 SpO2: (!) 77 % O2 Device: Nasal cannula Oxygen Delivery: 2 LPM  There were no vitals filed for this visit.  Vital Signs with Ranges  Temp:  [102  F (38.9  C)-102.4  F (39.1  C)] 102.4  F (39.1  C)  Resp:  [40] 40  SpO2:  [77 %-93 %] 77 %  I/O last 3 completed shifts:  In: -   Out: 600 [Urine:600]    Patient seen earlier in the morning and actively dying at that time. Guardian notified and allowed to come be with patient.   Patient  at 10:46 AM.   No respirations or heart sounds.     Discharge Disposition   Patient  during this admission  Condition at discharge:     Consultations This Hospital Stay   SPIRITUAL HEALTH SERVICES IP CONSULT  CARE MANAGEMENT / SOCIAL WORK IP CONSULT  CARE MANAGEMENT / SOCIAL WORK IP CONSULT  SPIRITUAL HEALTH SERVICES IP CONSULT    Time Spent on this Encounter   I, Annemarie Alves MD, personally saw the patient today and spent greater than 30 minutes discharging this patient.    Discharge Orders   Patient     Discharge Medications   Patient     Allergies   No Known Allergies  Data   Most Recent 3 CBC's:  Recent Labs   Lab Test 23  1200 01/10/23  1222 22  0828 22  0704   WBC 8.1 10.2  --  11.7*   HGB 11.9* 12.0* 10.1* 8.8*  8.8*    97  --  105*    399  --  357      Most Recent 3 BMP's:  Recent Labs   Lab Test 23  1200 01/10/23  1222 22  0556     136 141   POTASSIUM 4.5 4.5 4.4   CHLORIDE 100 105 108*   CO2 28 16* 21*   BUN 14.8 15.7 12.9   CR 0.83 0.82 0.74   ANIONGAP 7 15 12   FLORENTINO 9.6 9.3 9.0   GLC 96 117* 75

## 2024-03-26 NOTE — CARE PLAN
Post-mortem care completed by writer. X2 BERNARDO & Tay removed. Documentation completed and No patient belongings in room; verified by patients niece at bedside.     Pt transported to [facility] @  [id number]

## 2024-03-26 NOTE — PROGRESS NOTES
Notified provider about indwelling schaefer catheter discussed removal or continued need.    Did provider choose to remove indwelling schaefer catheter? NO    Provider's schaefer indication for keeping indwelling schaefer catheter: Indication for continued use: End of Live    Is there an order for indwelling schaefer catheter? YES    *If there is a plan to keep schaefer catheter in place at discharge daily notification with provider is not necessary, but please add a notation in the treatment team sticky note that the patient will be discharging with the catheter.

## 2024-03-26 NOTE — PLAN OF CARE
PRIMARY DIAGNOSIS: Acute hypoxemic respiratory failure, Comfort Cares.  OUTPATIENT/OBSERVATION GOALS TO BE MET BEFORE DISCHARGE:  ADLs back to baseline: No    Activity and level of assistance: Up with maximum assistance. Consider SW and/or PT evaluation.     Pain status: Improved-controlled with oral pain medications.    Return to near baseline physical activity: No     Discharge Planner Nurse   Safe discharge environment identified: Yes  Barriers to discharge: Yes  A & O to self. Comfort care patient. Patient noted to be calm and somnolent. Patient on 2 LPM supplemental oxygen NPO status except for medications. Peripheral IV X 2 saline lock. RN spoke with patient's guardian ( Irina) for updates. Dilaudid, Lorazepam, Haldol, and atropine available for comfort-See MAR. Possible discharge on 3/26 via  [facility] Hospice . SW following for discharge planning.  Will continue to provide supportive cares.       Entered by: Krystin Mccullough RN 03/25/2024 10:00 PM     Problem: Adult Inpatient Plan of Care  Goal: Plan of Care Review  Description: The Plan of Care Review/Shift note should be completed every shift.  The Outcome Evaluation is a brief statement about your assessment that the patient is improving, declining, or no change.  This information will be displayed automatically on your shift  note.  Outcome: Progressing  Flowsheets (Taken 3/25/2024 2200)  Plan of Care Reviewed With: guardian     Problem: Adult Inpatient Plan of Care  Goal: Plan of Care Review  Description: The Plan of Care Review/Shift note should be completed every shift.  The Outcome Evaluation is a brief statement about your assessment that the patient is improving, declining, or no change.  This information will be displayed automatically on your shift  note.  Outcome: Progressing  Flowsheets (Taken 3/25/2024 2200)  Plan of Care Reviewed With: guardian     Please review provider order for any additional goals.   Nurse to  notify provider when observation goals have been met and patient is ready for discharge.    Plan of Care Reviewed With: guardian

## 2024-03-26 NOTE — PLAN OF CARE
PRIMARY DIAGNOSIS: Acute hypoxemic respiratory failure, Comfort Cares.  OUTPATIENT/OBSERVATION GOALS TO BE MET BEFORE DISCHARGE:  ADLs back to baseline: No    Activity and level of assistance: Up with maximum assistance. Consider SW and/or PT evaluation.     Pain status: Improved-controlled with oral pain medications.    Return to near baseline physical activity: No     Discharge Planner Nurse   Safe discharge environment identified: Yes  Barriers to discharge: Yes  A & O to self. Comfort care patient. Patient noted to be calm and somnolent. Patient on 2 LPM supplemental oxygen NPO status except for medications. External catheter in place with adequate output. Peripheral IV X 2 saline lock. RN spoke with patient's guardian ( Irina) for updates. Dilaudid, Lorazepam, Haldol, and atropine available for comfort-See MAR. Possible discharge on 3/26 via [facility] on [facility] . SW following for discharge planning.  Will continue to provide supportive cares.       Entered by: Krystin Mccullough RN 03/25/2024 10:23 PM     Problem: Adult Inpatient Plan of Care  Goal: Plan of Care Review  Description: The Plan of Care Review/Shift note should be completed every shift.  The Outcome Evaluation is a brief statement about your assessment that the patient is improving, declining, or no change.  This information will be displayed automatically on your shift  note.  Outcome: Progressing  Flowsheets (Taken 3/25/2024 2200)  Plan of Care Reviewed With: guardian     Problem: Adult Inpatient Plan of Care  Goal: Plan of Care Review  Description: The Plan of Care Review/Shift note should be completed every shift.  The Outcome Evaluation is a brief statement about your assessment that the patient is improving, declining, or no change.  This information will be displayed automatically on your shift  note.  Outcome: Progressing  Flowsheets (Taken 3/25/2024 2200)  Plan of Care Reviewed With: guardian     Please review provider  order for any additional goals.   Nurse to notify provider when observation goals have been met and patient is ready for discharge.    Plan of Care Reviewed With: guardian

## 2024-03-26 NOTE — PLAN OF CARE
"Goal Outcome Evaluation:      PRIMARY DIAGNOSIS: End-Of-Life Comfort Care  OUTPATIENT/OBSERVATION GOALS TO BE MET BEFORE DISCHARGE:  ADLs back to baseline:  N/A    Activity and level of assistance: Bedrest; comfort care    Pain status: See MAR Comfort meds in use    Return to near baseline physical activity:  N/A     Discharge Planner Nurse   Safe discharge environment identified: N/A  Barriers to discharge: No       Entered by: Ruthann Garcia RN 03/26/2024 8:40 AM     Please review provider order for any additional goals.   Nurse to notify provider when observation goals have been met and patient is ready for discharge.    Pt remains on comfort care. Provider assessment requested.  SW plan to discharge to hospice cancelled per provider.   Pt Bladder Scanned for comfort; >999mL. Tay requested, verbal order received from provider. Tay placed. Q2 hr repositioning completed. Apneic breathing visualized. Pt unresponsive. Heel-off- in place. Oral Care and Perineum care completed. SW following.       Plan of Care Reviewed With: family    Overall Patient Progress: decliningOverall Patient Progress: declining    Outcome Evaluation: D/c to hospice Care. Nonverbal indicators of pain absent.      Problem: Adult Inpatient Plan of Care  Goal: Plan of Care Review  Description: The Plan of Care Review/Shift note should be completed every shift.  The Outcome Evaluation is a brief statement about your assessment that the patient is improving, declining, or no change.  This information will be displayed automatically on your shift  note.  Outcome: Adequate for Care Transition  Flowsheets (Taken 3/26/2024 0752)  Outcome Evaluation: D/c to hospice Care. Nonverbal indicators of pain absent.  Plan of Care Reviewed With: family  Overall Patient Progress: declining  Goal: Patient-Specific Goal (Individualized)  Description: You can add care plan individualizations to a care plan. Examples of Individualization might be:  \"Parent " "requests to be called daily at 9am for status\", \"I have a hard time hearing out of my right ear\", or \"Do not touch me to wake me up as it startles  me\".  Outcome: Adequate for Care Transition  Goal: Absence of Hospital-Acquired Illness or Injury  Outcome: Adequate for Care Transition  Goal: Optimal Comfort and Wellbeing  Outcome: Adequate for Care Transition  Goal: Readiness for Transition of Care  Outcome: Adequate for Care Transition     "

## 2024-03-26 NOTE — PROGRESS NOTES
Care Management Follow Up    Length of Stay (days): 0    Expected Discharge Date: 03/26/2024     Concerns to be Addressed: discharge planning     Patient plan of care discussed at interdisciplinary rounds: Yes    Additional Information:  Updated by provider. Pt has had a change in condition. No longer stable to discharge/transport today. [facility] stretcher ride cancelled. VM left for [facility] to cancel hospice admission today.     Social work will continue to follow and remain available as needed.     VALERIA Darnell, W  Care Coordination  [phone]    VALERIA Noriega

## 2024-03-26 NOTE — PLAN OF CARE
"PRIMARY DIAGNOSIS: Comfort Cares   OUTPATIENT/OBSERVATION GOALS TO BE MET BEFORE DISCHARGE:  ADLs back to baseline: No    Activity and level of assistance: Ax2 Lift    Pain status: Improved but still requiring IV narcotics.    Return to near baseline physical activity: No     Discharge Planner Nurse   Safe discharge environment identified: Yes  Barriers to discharge: Yes       Entered by: Lyndsey Beck RN 03/26/2024 2:32 AM     Please review provider order for any additional goals.   Nurse to notify provider when observation goals have been met and patient is ready for discharge.    Vitals are Temp: (!) 102.4  F (39.1  C) Temp src: Axillary       Resp: (!) 40 SpO2: (!) 77 %.  Patient is minimally responsive. Opens eyes to touch/gentle shaking. They are on bedrest.  Pt is a NPO diet.  They are showing nonverbal signs of pain.  Dilaudid given for pain.  Patient is Saline locked. Ativan given for restlessness. Robinul and Atropine administered for oral secretions. Tylenol administered for temperature of 102.4. Turn and reposition as tolerated. 2 LPM NC. External cath in place. SW following. Plan is to discharge back to  today w/ hospice services, stretcher transport arranged from [phone].       Problem: Adult Inpatient Plan of Care  Goal: Plan of Care Review  Description: The Plan of Care Review/Shift note should be completed every shift.  The Outcome Evaluation is a brief statement about your assessment that the patient is improving, declining, or no change.  This information will be displayed automatically on your shift  note.  Outcome: Progressing  Goal: Patient-Specific Goal (Individualized)  Description: You can add care plan individualizations to a care plan. Examples of Individualization might be:  \"Parent requests to be called daily at 9am for status\", \"I have a hard time hearing out of my right ear\", or \"Do not touch me to wake me up as it startles  me\".  Outcome: Progressing  Goal: Absence of " Hospital-Acquired Illness or Injury  Outcome: Progressing  Intervention: Identify and Manage Fall Risk  Recent Flowsheet Documentation  Taken 3/25/2024 2323 by Lyndsey Beck RN  Safety Promotion/Fall Prevention:   room near nurse's station   safety round/check completed  Intervention: Prevent Skin Injury  Recent Flowsheet Documentation  Taken 3/25/2024 2323 by Lyndsey Beck RN  Body Position: supine, head elevated  Goal: Optimal Comfort and Wellbeing  Outcome: Progressing  Intervention: Monitor Pain and Promote Comfort  Recent Flowsheet Documentation  Taken 3/25/2024 2323 by Lyndsey Beck RN  Pain Management Interventions: medication (see MAR)  Goal: Readiness for Transition of Care  Outcome: Progressing     Problem: End-of-Life Care  Goal: Comfort, Peace and Preserved Dignity  Outcome: Progressing

## 2024-03-26 NOTE — DEATH PRONOUNCEMENT
MD DEATH PRONOUNCEMENT    Called to pronounce Scott Abbasi dead.    Physical Exam: Spontaneous respirations absent, Breath sounds absent, and Heart sounds absent    Patient was pronounced dead at 10:46 AM, 2024.    Preliminary Cause of Death: Aspiration pneumonia (H)     Aspiration pneumonia, duration 3 days  Dysphagia, duration 3 months  Dementia, duration 7 years    Principal Problem:    History of dementia  Active Problems:    Acute respiratory failure with hypoxia (H)    Fever in adult     Infectious disease present?: NO    Communicable disease present? (examples: HIV, chicken pox, TB, Ebola, CJD) :  NO    Multi-drug resistant organism present? (example: MRSA): NO    Please consider an autopsy if any of the following exist:  NO Unexpected or unexplained death during or following any dental, medical, or surgical diagnostic treatment procedures.   NO Death of mother at or up to seven days after delivery.     NO All  and pediatric deaths.     NO Death where the cause is sufficiently obscure to delay completion of the death certificate.   NO Deaths in which autopsy would confirm a suspected illness/condition that would affect surviving family members or recipients of transplanted organs.     The following deaths must be reported to the 's Office:  NO A death that may be due entirely or in part to any factors other than natural disease (recent surgery, recent trauma, suspected abuse/neglect).   NO A death that may be an accident, suicide, or homicide.     NO Any sudden, unexpected death in which there is no prior history of significant heart disease or any other condition associated with sudden death.   NO A death under suspicious, unusual, or unexpected circumstances.    NO Any death which is apparently due to natural causes but in which the  does not have a personal physician familiar with the patient s medical history, social, or environmental situation or the circumstances of  the terminal event.   NO Any death apparently due to Sudden Infant Death Syndrome.     NO Deaths that occur during, in association with, or as consequences of a diagnostic, therapeutic, or anesthetic procedure.   NO Any death in which a fracture of a major bone has occurred within the past (6) six months.   NO A death of persons note seen by their physician within 120 days of demise.     NO Any death in which the  was an inmate of a public institution or was in the custody of Law Enforcement personnel.   NO  All unexpected deaths of children   NO Solid organ donors   NO Unidentified bodies   NO Deaths of persons whose bodies are to be cremated or otherwise disposed of so that the bodies will later be unavailable for examination;   NO Deaths unattended by a physician outside of a licensed healthcare facility or licensed residential hospice program   NO Deaths occurring within 24 hours of arrival to a health care facility if death is unexpected.    NO Deaths associated with the decedent s employment.   NO Deaths attributed to acts of terrorism.   NO Any death in which there is uncertainty as to whether it is a medical examiner s care should be discussed with the medical investigator.        Body disposition: Autopsy was discussed with family member:  Niece, guardian Irina Weinstein Tres in person.  Permission for autopsy was declined.

## 2024-03-26 NOTE — PLAN OF CARE
"PRIMARY DIAGNOSIS: Comfort Cares   OUTPATIENT/OBSERVATION GOALS TO BE MET BEFORE DISCHARGE:  ADLs back to baseline: No    Activity and level of assistance: Ax2 Lift    Pain status: Improved but still requiring IV narcotics.    Return to near baseline physical activity: No     Discharge Planner Nurse   Safe discharge environment identified: Yes  Barriers to discharge: Yes       Entered by: Lyndsey Beck RN 03/26/2024 4:15 AM     Please review provider order for any additional goals.   Nurse to notify provider when observation goals have been met and patient is ready for discharge.    Vitals are Temp: (!) 102.4  F (39.1  C) Temp src: Axillary       Resp: (!) 40 SpO2: (!) 77 %.  Patient is minimally responsive. Opens eyes to touch/gentle shaking. They are on bedrest.  Pt is a NPO diet.  They are showing nonverbal signs of pain.  Dilaudid given for pain.  Patient is Saline locked. Robinul administered for oral secretions. Turn and reposition as tolerated. 2 LPM NC. External cath in place. SW following. Plan is to discharge back to  today w/ hospice services, stretcher transport arranged from [phone].       Problem: Adult Inpatient Plan of Care  Goal: Plan of Care Review  Description: The Plan of Care Review/Shift note should be completed every shift.  The Outcome Evaluation is a brief statement about your assessment that the patient is improving, declining, or no change.  This information will be displayed automatically on your shift  note.  Outcome: Progressing  Goal: Patient-Specific Goal (Individualized)  Description: You can add care plan individualizations to a care plan. Examples of Individualization might be:  \"Parent requests to be called daily at 9am for status\", \"I have a hard time hearing out of my right ear\", or \"Do not touch me to wake me up as it startles  me\".  Outcome: Progressing  Goal: Absence of Hospital-Acquired Illness or Injury  Outcome: Progressing  Intervention: Identify and Manage Fall " Risk  Recent Flowsheet Documentation  Taken 3/25/2024 2323 by Lyndsey Beck RN  Safety Promotion/Fall Prevention:   room near nurse's station   safety round/check completed  Intervention: Prevent Skin Injury  Recent Flowsheet Documentation  Taken 3/25/2024 2323 by Lyndsey Beck RN  Body Position: supine, head elevated  Goal: Optimal Comfort and Wellbeing  Outcome: Progressing  Intervention: Monitor Pain and Promote Comfort  Recent Flowsheet Documentation  Taken 3/25/2024 2323 by Lyndsey Beck RN  Pain Management Interventions: medication (see MAR)  Goal: Readiness for Transition of Care  Outcome: Progressing     Problem: End-of-Life Care  Goal: Comfort, Peace and Preserved Dignity  Outcome: Progressing

## 2024-03-26 NOTE — PROGRESS NOTES
[facility] Progress Note  Obs 2    On call page to comfort family due to patient's death.    Supported family and blessed patient's body.    May eternal light shine on Mr. Scott Abbasi and may he Rest In Peace.    Rev. Renate Laureano M.Div.  Staff   Phone  [phone]
